# Patient Record
Sex: MALE | Race: WHITE | NOT HISPANIC OR LATINO | Employment: FULL TIME | URBAN - METROPOLITAN AREA
[De-identification: names, ages, dates, MRNs, and addresses within clinical notes are randomized per-mention and may not be internally consistent; named-entity substitution may affect disease eponyms.]

---

## 2017-03-09 RX ORDER — BUPROPION HYDROCHLORIDE 100 MG/1
TABLET ORAL
Qty: 180 TABLET | Refills: 0 | Status: SHIPPED | OUTPATIENT
Start: 2017-03-09 | End: 2017-06-11 | Stop reason: SDUPTHER

## 2017-03-29 ENCOUNTER — OFFICE VISIT (OUTPATIENT)
Dept: INTERNAL MEDICINE | Facility: CLINIC | Age: 37
End: 2017-03-29

## 2017-03-29 VITALS
WEIGHT: 210 LBS | HEIGHT: 68 IN | BODY MASS INDEX: 31.83 KG/M2 | SYSTOLIC BLOOD PRESSURE: 130 MMHG | DIASTOLIC BLOOD PRESSURE: 78 MMHG | HEART RATE: 68 BPM

## 2017-03-29 DIAGNOSIS — F32.89 OTHER DEPRESSION: Primary | ICD-10-CM

## 2017-03-29 DIAGNOSIS — R53.83 OTHER FATIGUE: ICD-10-CM

## 2017-03-29 DIAGNOSIS — R35.89 POLYURIA: ICD-10-CM

## 2017-03-29 DIAGNOSIS — H61.22 IMPACTED CERUMEN, LEFT EAR: ICD-10-CM

## 2017-03-29 PROBLEM — F32.A DEPRESSION: Status: ACTIVE | Noted: 2017-03-29

## 2017-03-29 LAB
BILIRUB BLD-MCNC: NEGATIVE MG/DL
CLARITY, POC: CLEAR
COLOR UR: YELLOW
GLUCOSE UR STRIP-MCNC: NEGATIVE MG/DL
KETONES UR QL: NEGATIVE
LEUKOCYTE EST, POC: NEGATIVE
NITRITE UR-MCNC: NEGATIVE MG/ML
PH UR: 7 [PH] (ref 5–8)
PROT UR STRIP-MCNC: NEGATIVE MG/DL
RBC # UR STRIP: ABNORMAL /UL
SP GR UR: 1.01 (ref 1–1.03)
UROBILINOGEN UR QL: NORMAL

## 2017-03-29 PROCEDURE — 81003 URINALYSIS AUTO W/O SCOPE: CPT | Performed by: INTERNAL MEDICINE

## 2017-03-29 PROCEDURE — 99203 OFFICE O/P NEW LOW 30 MIN: CPT | Performed by: INTERNAL MEDICINE

## 2017-03-29 NOTE — PROGRESS NOTES
Patient is a 36 y.o. male who is here to re establish care.  Chief Complaint   Patient presents with   • Establish Care         HPI:  Here for re est care.  Currently fatigued for some time.  No polyuria or dipsia.  Concerned he may be diabetic.  Weight is steady.  No dizziness or lightheadedness.  No HA's.  No abdominal complaints.  Some cold intolerance.  No ankle edema.      History:    Patient Active Problem List   Diagnosis   • Depression   • Fatigue   • Polyuria   • Impacted cerumen, left ear       No past medical history on file.    Past Surgical History:   Procedure Laterality Date   • HEMORRHOIDECTOMY  2015       Current Outpatient Prescriptions on File Prior to Visit   Medication Sig   • buPROPion (WELLBUTRIN) 100 MG tablet TAKE ONE TABLET BY MOUTH TWICE DAILY     No current facility-administered medications on file prior to visit.        Family History   Problem Relation Age of Onset   • Hypertension Father    • Hypertension Sister    • Diabetes Neg Hx        Social History     Social History   • Marital status: Single     Spouse name: N/A   • Number of children: N/A   • Years of education: N/A     Occupational History   • Not on file.     Social History Main Topics   • Smoking status: Former Smoker   • Smokeless tobacco: Never Used   • Alcohol use No   • Drug use: No   • Sexual activity: Not on file     Other Topics Concern   • Not on file     Social History Narrative   • No narrative on file         ROS:    Review of Systems   Constitutional: Positive for fatigue. Negative for chills, fever and unexpected weight change.   HENT: Negative for congestion, ear pain, hearing loss, rhinorrhea, sinus pressure, sore throat and trouble swallowing.    Eyes: Negative for discharge and itching.   Respiratory: Negative for cough, chest tightness and shortness of breath.    Cardiovascular: Negative for chest pain, palpitations and leg swelling.   Gastrointestinal: Negative for abdominal pain, blood in stool,  "constipation, diarrhea and vomiting.   Endocrine: Negative for polydipsia and polyuria.   Genitourinary: Negative for difficulty urinating, dysuria, enuresis, frequency, hematuria and urgency.   Musculoskeletal: Negative for arthralgias, back pain, gait problem and joint swelling.   Skin: Negative for rash and wound.   Allergic/Immunologic: Negative for immunocompromised state.   Neurological: Negative for dizziness, syncope, weakness, light-headedness, numbness and headaches.   Hematological: Does not bruise/bleed easily.   Psychiatric/Behavioral: Negative for behavioral problems, dysphoric mood and sleep disturbance. The patient is not nervous/anxious.        /78  Pulse 68  Ht 67.5\" (171.5 cm)  Wt 210 lb (95.3 kg)  BMI 32.41 kg/m2    Physical Exam:    Physical Exam   Constitutional: He is oriented to person, place, and time. He appears well-developed and well-nourished.   HENT:   Head: Normocephalic and atraumatic.   Right Ear: External ear normal.   Left Ear: External ear normal.   Mouth/Throat: Oropharynx is clear and moist.   Eyes: Conjunctivae and EOM are normal.   Neck: Normal range of motion. Neck supple.   Cardiovascular: Normal rate, regular rhythm and normal heart sounds.    Pulmonary/Chest: Effort normal and breath sounds normal.   Abdominal: Soft. Bowel sounds are normal.   Musculoskeletal: Normal range of motion.   Lymphadenopathy:     He has no cervical adenopathy.   Neurological: He is alert and oriented to person, place, and time.   Skin: Skin is warm and dry.   Psychiatric: He has a normal mood and affect. His behavior is normal. Thought content normal.       Procedure:      Discussion/Summary:  Fatigue-labs soon  Depression-stable  Polyuria-check UA  Cerumen-debrox prn      Current Outpatient Prescriptions:   •  buPROPion (WELLBUTRIN) 100 MG tablet, TAKE ONE TABLET BY MOUTH TWICE DAILY, Disp: 180 tablet, Rfl: 0        Robert was seen today for establish care.    Diagnoses and all orders " for this visit:    Other depression    Other fatigue    Polyuria  -     POC Urinalysis Dipstick, Automated    Impacted cerumen, left ear

## 2017-04-11 ENCOUNTER — LAB (OUTPATIENT)
Dept: INTERNAL MEDICINE | Facility: CLINIC | Age: 37
End: 2017-04-11

## 2017-04-11 DIAGNOSIS — R31.9 HEMATURIA: Primary | ICD-10-CM

## 2017-04-11 LAB
BILIRUB BLD-MCNC: NEGATIVE MG/DL
CLARITY, POC: CLEAR
COLOR UR: YELLOW
GLUCOSE UR STRIP-MCNC: NEGATIVE MG/DL
KETONES UR QL: NEGATIVE
LEUKOCYTE EST, POC: NEGATIVE
NITRITE UR-MCNC: NEGATIVE MG/ML
PH UR: 6 [PH] (ref 5–8)
PROT UR STRIP-MCNC: NEGATIVE MG/DL
RBC # UR STRIP: ABNORMAL /UL
SP GR UR: 1.02 (ref 1–1.03)
UROBILINOGEN UR QL: NORMAL

## 2017-04-11 PROCEDURE — 81003 URINALYSIS AUTO W/O SCOPE: CPT | Performed by: INTERNAL MEDICINE

## 2017-04-13 DIAGNOSIS — R31.9 HEMATURIA: Primary | ICD-10-CM

## 2017-06-12 RX ORDER — BUPROPION HYDROCHLORIDE 100 MG/1
TABLET ORAL
Qty: 180 TABLET | Refills: 0 | Status: SHIPPED | OUTPATIENT
Start: 2017-06-12 | End: 2017-09-14 | Stop reason: SDUPTHER

## 2017-09-14 RX ORDER — BUPROPION HYDROCHLORIDE 100 MG/1
TABLET ORAL
Qty: 180 TABLET | Refills: 0 | Status: SHIPPED | OUTPATIENT
Start: 2017-09-14 | End: 2017-12-14 | Stop reason: SDUPTHER

## 2017-12-14 ENCOUNTER — OFFICE VISIT (OUTPATIENT)
Dept: INTERNAL MEDICINE | Facility: CLINIC | Age: 37
End: 2017-12-14

## 2017-12-14 VITALS
HEIGHT: 68 IN | BODY MASS INDEX: 32.74 KG/M2 | HEART RATE: 68 BPM | SYSTOLIC BLOOD PRESSURE: 130 MMHG | WEIGHT: 216 LBS | DIASTOLIC BLOOD PRESSURE: 74 MMHG

## 2017-12-14 DIAGNOSIS — R31.21 ASYMPTOMATIC MICROSCOPIC HEMATURIA: Primary | ICD-10-CM

## 2017-12-14 DIAGNOSIS — F32.89 OTHER DEPRESSION: ICD-10-CM

## 2017-12-14 PROCEDURE — 99213 OFFICE O/P EST LOW 20 MIN: CPT | Performed by: INTERNAL MEDICINE

## 2017-12-14 RX ORDER — BUPROPION HYDROCHLORIDE 100 MG/1
100 TABLET ORAL 2 TIMES DAILY
Qty: 180 TABLET | Refills: 3 | Status: SHIPPED | OUTPATIENT
Start: 2017-12-14 | End: 2018-12-05 | Stop reason: SDUPTHER

## 2017-12-14 NOTE — PROGRESS NOTES
Patient is a 37 y.o. male who is here for a follow up of chronic condition.  Chief Complaint   Patient presents with   • Depression         HPI:  Here for f/u.  Continues to be followed by urologist for hematuria.  Doing well on the wellbutrin.  Sleeping well.  Working 10 hour shifts.  Energy level is fair but working long days.     History:    Patient Active Problem List   Diagnosis   • Depression   • Fatigue   • Polyuria   • Impacted cerumen, left ear   • Asymptomatic microscopic hematuria       No past medical history on file.    Past Surgical History:   Procedure Laterality Date   • HEMORRHOIDECTOMY  2015       Current Outpatient Prescriptions on File Prior to Visit   Medication Sig   • [DISCONTINUED] buPROPion (WELLBUTRIN) 100 MG tablet TAKE ONE TABLET BY MOUTH TWICE DAILY     No current facility-administered medications on file prior to visit.        Family History   Problem Relation Age of Onset   • Hypertension Father    • Hypertension Sister    • Diabetes Neg Hx        Social History     Social History   • Marital status: Single     Spouse name: N/A   • Number of children: N/A   • Years of education: N/A     Occupational History   • Not on file.     Social History Main Topics   • Smoking status: Former Smoker   • Smokeless tobacco: Never Used   • Alcohol use No   • Drug use: No   • Sexual activity: Not on file     Other Topics Concern   • Not on file     Social History Narrative         ROS:    Review of Systems   Constitutional: Positive for fatigue. Negative for chills, fever and unexpected weight change.   HENT: Negative for congestion, ear pain, hearing loss, rhinorrhea, sinus pressure, sore throat and trouble swallowing.    Eyes: Negative for discharge and itching.   Respiratory: Negative for cough, chest tightness and shortness of breath.    Cardiovascular: Negative for chest pain, palpitations and leg swelling.   Gastrointestinal: Negative for abdominal pain, blood in stool, constipation, diarrhea  "and vomiting.   Endocrine: Negative for polydipsia and polyuria.   Genitourinary: Negative for difficulty urinating, dysuria, enuresis, frequency, hematuria and urgency.   Musculoskeletal: Negative for arthralgias, back pain, gait problem and joint swelling.   Skin: Negative for rash and wound.   Allergic/Immunologic: Negative for immunocompromised state.   Neurological: Negative for dizziness, syncope, weakness, light-headedness, numbness and headaches.   Hematological: Does not bruise/bleed easily.   Psychiatric/Behavioral: Negative for behavioral problems, dysphoric mood and sleep disturbance. The patient is not nervous/anxious.        /74  Pulse 68  Ht 171.5 cm (67.52\")  Wt 98 kg (216 lb)  BMI 33.31 kg/m2    Physical Exam:    Physical Exam   Constitutional: He is oriented to person, place, and time. He appears well-developed and well-nourished.   HENT:   Head: Normocephalic and atraumatic.   Right Ear: External ear normal.   Left Ear: External ear normal.   Mouth/Throat: Oropharynx is clear and moist.   Eyes: Conjunctivae and EOM are normal.   Neck: Normal range of motion. Neck supple.   Cardiovascular: Normal rate, regular rhythm and normal heart sounds.    Pulmonary/Chest: Effort normal and breath sounds normal.   Abdominal: Soft. Bowel sounds are normal.   Musculoskeletal: Normal range of motion.   Lymphadenopathy:     He has no cervical adenopathy.   Neurological: He is alert and oriented to person, place, and time.   Skin: Skin is warm and dry.   Psychiatric: He has a normal mood and affect. His behavior is normal. Thought content normal.       Procedure:      Discussion/Summary:      Depression-stable  Hematuria-will refer to nephrology  Cerumen-debrox prn      Current Outpatient Prescriptions:   •  buPROPion (WELLBUTRIN) 100 MG tablet, Take 1 tablet by mouth 2 (Two) Times a Day., Disp: 180 tablet, Rfl: 3        Robert was seen today for depression.    Diagnoses and all orders for this " visit:    Asymptomatic microscopic hematuria  -     Ambulatory Referral to Nephrology    Other depression  -     buPROPion (WELLBUTRIN) 100 MG tablet; Take 1 tablet by mouth 2 (Two) Times a Day.

## 2018-03-10 ENCOUNTER — APPOINTMENT (OUTPATIENT)
Dept: LAB | Facility: HOSPITAL | Age: 38
End: 2018-03-10

## 2018-03-10 ENCOUNTER — LAB REQUISITION (OUTPATIENT)
Dept: LAB | Facility: HOSPITAL | Age: 38
End: 2018-03-10

## 2018-03-10 DIAGNOSIS — Z00.00 ROUTINE GENERAL MEDICAL EXAMINATION AT A HEALTH CARE FACILITY: ICD-10-CM

## 2018-03-10 DIAGNOSIS — N18.9 CHRONIC KIDNEY DISEASE: ICD-10-CM

## 2018-03-10 LAB
ALBUMIN SERPL-MCNC: 4.4 G/DL (ref 3.2–4.8)
ANION GAP SERPL CALCULATED.3IONS-SCNC: 2 MMOL/L (ref 3–11)
BACTERIA UR QL AUTO: ABNORMAL /HPF
BASOPHILS # BLD AUTO: 0.02 10*3/MM3 (ref 0–0.2)
BASOPHILS NFR BLD AUTO: 0.3 % (ref 0–1)
BILIRUB UR QL STRIP: NEGATIVE
BUN BLD-MCNC: 14 MG/DL (ref 9–23)
BUN/CREAT SERPL: 12.7 (ref 7–25)
CALCIUM SPEC-SCNC: 9.2 MG/DL (ref 8.7–10.4)
CHLORIDE SERPL-SCNC: 108 MMOL/L (ref 99–109)
CLARITY UR: CLEAR
CO2 SERPL-SCNC: 31 MMOL/L (ref 20–31)
COLOR UR: YELLOW
CREAT BLD-MCNC: 1.1 MG/DL (ref 0.6–1.3)
CREAT UR-MCNC: 120.8 MG/DL
DEPRECATED RDW RBC AUTO: 42.4 FL (ref 37–54)
EOSINOPHIL # BLD AUTO: 0.15 10*3/MM3 (ref 0–0.3)
EOSINOPHIL NFR BLD AUTO: 2.4 % (ref 0–3)
ERYTHROCYTE [DISTWIDTH] IN BLOOD BY AUTOMATED COUNT: 13 % (ref 11.3–14.5)
GFR SERPL CREATININE-BSD FRML MDRD: 75 ML/MIN/1.73
GLUCOSE BLD-MCNC: 104 MG/DL (ref 70–100)
GLUCOSE UR STRIP-MCNC: NEGATIVE MG/DL
HCT VFR BLD AUTO: 48.2 % (ref 38.9–50.9)
HGB BLD-MCNC: 15.8 G/DL (ref 13.1–17.5)
HGB UR QL STRIP.AUTO: ABNORMAL
HYALINE CASTS UR QL AUTO: ABNORMAL /LPF
IMM GRANULOCYTES # BLD: 0.01 10*3/MM3 (ref 0–0.03)
IMM GRANULOCYTES NFR BLD: 0.2 % (ref 0–0.6)
KETONES UR QL STRIP: NEGATIVE
LEUKOCYTE ESTERASE UR QL STRIP.AUTO: NEGATIVE
LYMPHOCYTES # BLD AUTO: 2.06 10*3/MM3 (ref 0.6–4.8)
LYMPHOCYTES NFR BLD AUTO: 33.4 % (ref 24–44)
MCH RBC QN AUTO: 29.2 PG (ref 27–31)
MCHC RBC AUTO-ENTMCNC: 32.8 G/DL (ref 32–36)
MCV RBC AUTO: 88.9 FL (ref 80–99)
MONOCYTES # BLD AUTO: 0.57 10*3/MM3 (ref 0–1)
MONOCYTES NFR BLD AUTO: 9.3 % (ref 0–12)
NEUTROPHILS # BLD AUTO: 3.35 10*3/MM3 (ref 1.5–8.3)
NEUTROPHILS NFR BLD AUTO: 54.4 % (ref 41–71)
NITRITE UR QL STRIP: NEGATIVE
PH UR STRIP.AUTO: 7 [PH] (ref 5–8)
PHOSPHATE SERPL-MCNC: 3.2 MG/DL (ref 2.4–5.1)
PLATELET # BLD AUTO: 208 10*3/MM3 (ref 150–450)
PMV BLD AUTO: 9.2 FL (ref 6–12)
POTASSIUM BLD-SCNC: 5 MMOL/L (ref 3.5–5.5)
PROT UR QL STRIP: NEGATIVE
PROT UR-MCNC: 5 MG/DL (ref 1–14)
RBC # BLD AUTO: 5.42 10*6/MM3 (ref 4.2–5.76)
RBC # UR: ABNORMAL /HPF
REF LAB TEST METHOD: ABNORMAL
SODIUM BLD-SCNC: 141 MMOL/L (ref 132–146)
SP GR UR STRIP: 1.01 (ref 1–1.03)
SQUAMOUS #/AREA URNS HPF: ABNORMAL /HPF
UROBILINOGEN UR QL STRIP: ABNORMAL
WBC NRBC COR # BLD: 6.16 10*3/MM3 (ref 3.5–10.8)
WBC UR QL AUTO: ABNORMAL /HPF

## 2018-03-10 PROCEDURE — 80069 RENAL FUNCTION PANEL: CPT | Performed by: INTERNAL MEDICINE

## 2018-03-10 PROCEDURE — 85025 COMPLETE CBC W/AUTO DIFF WBC: CPT | Performed by: INTERNAL MEDICINE

## 2018-03-10 PROCEDURE — 81001 URINALYSIS AUTO W/SCOPE: CPT | Performed by: INTERNAL MEDICINE

## 2018-03-10 PROCEDURE — 84156 ASSAY OF PROTEIN URINE: CPT | Performed by: INTERNAL MEDICINE

## 2018-03-10 PROCEDURE — 82570 ASSAY OF URINE CREATININE: CPT | Performed by: INTERNAL MEDICINE

## 2018-04-30 ENCOUNTER — LAB REQUISITION (OUTPATIENT)
Dept: LAB | Facility: HOSPITAL | Age: 38
End: 2018-04-30

## 2018-04-30 DIAGNOSIS — Z00.00 ROUTINE GENERAL MEDICAL EXAMINATION AT A HEALTH CARE FACILITY: ICD-10-CM

## 2018-04-30 LAB
COLLECT DURATION TIME UR: 24 HRS
CREAT UR-MCNC: 59.8 MG/DL
CREATINE 24H UR-MRATE: 0.9 G/24 HR (ref 1–2)
SPECIMEN VOL 24H UR: 1500 ML

## 2018-04-30 PROCEDURE — 82570 ASSAY OF URINE CREATININE: CPT | Performed by: INTERNAL MEDICINE

## 2018-04-30 PROCEDURE — 81050 URINALYSIS VOLUME MEASURE: CPT | Performed by: INTERNAL MEDICINE

## 2018-05-30 RX ORDER — METHYLPREDNISOLONE 4 MG/1
TABLET ORAL
Qty: 21 TABLET | Refills: 0 | Status: SHIPPED | OUTPATIENT
Start: 2018-05-30 | End: 2019-04-01

## 2018-06-03 ENCOUNTER — LAB REQUISITION (OUTPATIENT)
Dept: LAB | Facility: HOSPITAL | Age: 38
End: 2018-06-03

## 2018-06-03 DIAGNOSIS — R79.89 OTHER SPECIFIED ABNORMAL FINDINGS OF BLOOD CHEMISTRY: ICD-10-CM

## 2018-06-03 DIAGNOSIS — Z00.00 ROUTINE GENERAL MEDICAL EXAMINATION AT A HEALTH CARE FACILITY: ICD-10-CM

## 2018-06-03 LAB
ANION GAP SERPL CALCULATED.3IONS-SCNC: 6 MMOL/L (ref 3–11)
BACTERIA UR QL AUTO: NORMAL /HPF
BILIRUB UR QL STRIP: NEGATIVE
BUN BLD-MCNC: 24 MG/DL (ref 9–23)
BUN/CREAT SERPL: 21.8 (ref 7–25)
CALCIUM SPEC-SCNC: 9 MG/DL (ref 8.7–10.4)
CHLORIDE SERPL-SCNC: 102 MMOL/L (ref 99–109)
CLARITY UR: CLEAR
CO2 SERPL-SCNC: 31 MMOL/L (ref 20–31)
COLLECT DURATION TIME UR: 24 HRS
COLOR UR: YELLOW
CREAT BLD-MCNC: 1.1 MG/DL (ref 0.6–1.3)
CREAT UR-MCNC: 48.5 MG/DL
CREATINE 24H UR-MRATE: 1.41 G/24 HR (ref 1–2)
GFR SERPL CREATININE-BSD FRML MDRD: 75 ML/MIN/1.73
GLUCOSE BLD-MCNC: 96 MG/DL (ref 70–100)
GLUCOSE UR STRIP-MCNC: NEGATIVE MG/DL
HGB UR QL STRIP.AUTO: ABNORMAL
HYALINE CASTS UR QL AUTO: NORMAL /LPF
KETONES UR QL STRIP: NEGATIVE
LEUKOCYTE ESTERASE UR QL STRIP.AUTO: NEGATIVE
NITRITE UR QL STRIP: NEGATIVE
PH UR STRIP.AUTO: 5.5 [PH] (ref 5–8)
POTASSIUM BLD-SCNC: 4.4 MMOL/L (ref 3.5–5.5)
PROT UR QL STRIP: NEGATIVE
RBC # UR: NORMAL /HPF
REF LAB TEST METHOD: NORMAL
SODIUM BLD-SCNC: 139 MMOL/L (ref 132–146)
SP GR UR STRIP: 1.02 (ref 1–1.03)
SPECIMEN VOL 24H UR: 2900 ML
SQUAMOUS #/AREA URNS HPF: NORMAL /HPF
UROBILINOGEN UR QL STRIP: ABNORMAL
WBC UR QL AUTO: NORMAL /HPF

## 2018-06-03 PROCEDURE — 81050 URINALYSIS VOLUME MEASURE: CPT | Performed by: INTERNAL MEDICINE

## 2018-06-03 PROCEDURE — 80048 BASIC METABOLIC PNL TOTAL CA: CPT | Performed by: INTERNAL MEDICINE

## 2018-06-03 PROCEDURE — 81001 URINALYSIS AUTO W/SCOPE: CPT | Performed by: INTERNAL MEDICINE

## 2018-06-03 PROCEDURE — 82570 ASSAY OF URINE CREATININE: CPT | Performed by: INTERNAL MEDICINE

## 2018-12-05 DIAGNOSIS — F32.89 OTHER DEPRESSION: ICD-10-CM

## 2018-12-05 RX ORDER — BUPROPION HYDROCHLORIDE 100 MG/1
TABLET ORAL
Qty: 180 TABLET | Refills: 0 | Status: SHIPPED | OUTPATIENT
Start: 2018-12-05 | End: 2019-03-07 | Stop reason: SDUPTHER

## 2019-03-07 DIAGNOSIS — F32.89 OTHER DEPRESSION: ICD-10-CM

## 2019-03-07 RX ORDER — BUPROPION HYDROCHLORIDE 100 MG/1
TABLET ORAL
Qty: 180 TABLET | Refills: 0 | Status: SHIPPED | OUTPATIENT
Start: 2019-03-07 | End: 2020-06-03

## 2019-04-01 ENCOUNTER — OFFICE VISIT (OUTPATIENT)
Dept: FAMILY MEDICINE CLINIC | Facility: CLINIC | Age: 39
End: 2019-04-01

## 2019-04-01 ENCOUNTER — LAB REQUISITION (OUTPATIENT)
Dept: LAB | Facility: HOSPITAL | Age: 39
End: 2019-04-01

## 2019-04-01 VITALS
WEIGHT: 197 LBS | TEMPERATURE: 98.2 F | RESPIRATION RATE: 16 BRPM | SYSTOLIC BLOOD PRESSURE: 124 MMHG | OXYGEN SATURATION: 98 % | HEART RATE: 90 BPM | HEIGHT: 69 IN | BODY MASS INDEX: 29.18 KG/M2 | DIASTOLIC BLOOD PRESSURE: 80 MMHG

## 2019-04-01 DIAGNOSIS — Z00.00 HEALTHCARE MAINTENANCE: Primary | ICD-10-CM

## 2019-04-01 DIAGNOSIS — Z00.00 ROUTINE GENERAL MEDICAL EXAMINATION AT A HEALTH CARE FACILITY: ICD-10-CM

## 2019-04-01 PROBLEM — R31.21 ASYMPTOMATIC MICROSCOPIC HEMATURIA: Status: RESOLVED | Noted: 2017-12-14 | Resolved: 2019-04-01

## 2019-04-01 PROBLEM — R53.83 FATIGUE: Status: RESOLVED | Noted: 2017-03-29 | Resolved: 2019-04-01

## 2019-04-01 PROBLEM — R35.89 POLYURIA: Status: RESOLVED | Noted: 2017-03-29 | Resolved: 2019-04-01

## 2019-04-01 PROBLEM — F32.A DEPRESSION: Status: RESOLVED | Noted: 2017-03-29 | Resolved: 2019-04-01

## 2019-04-01 PROBLEM — H61.22 IMPACTED CERUMEN, LEFT EAR: Status: RESOLVED | Noted: 2017-03-29 | Resolved: 2019-04-01

## 2019-04-01 LAB
ALBUMIN SERPL-MCNC: 4.78 G/DL (ref 3.2–4.8)
ALBUMIN/GLOB SERPL: 2.1 G/DL (ref 1.5–2.5)
ALP SERPL-CCNC: 82 U/L (ref 25–100)
ALT SERPL W P-5'-P-CCNC: 24 U/L (ref 7–40)
ANION GAP SERPL CALCULATED.3IONS-SCNC: 11 MMOL/L (ref 3–11)
ARTICHOKE IGE QN: 45 MG/DL (ref 0–130)
AST SERPL-CCNC: 22 U/L (ref 0–33)
BILIRUB SERPL-MCNC: 0.5 MG/DL (ref 0.3–1.2)
BUN BLD-MCNC: 13 MG/DL (ref 9–23)
BUN/CREAT SERPL: 12.5 (ref 7–25)
CALCIUM SPEC-SCNC: 9.7 MG/DL (ref 8.7–10.4)
CHLORIDE SERPL-SCNC: 104 MMOL/L (ref 99–109)
CHOLEST SERPL-MCNC: 113 MG/DL (ref 0–200)
CO2 SERPL-SCNC: 26 MMOL/L (ref 20–31)
CREAT BLD-MCNC: 1.04 MG/DL (ref 0.6–1.3)
CRP SERPL-MCNC: 1.72 MG/DL (ref 0–1)
GFR SERPL CREATININE-BSD FRML MDRD: 79 ML/MIN/1.73
GLOBULIN UR ELPH-MCNC: 2.3 GM/DL
GLUCOSE BLD-MCNC: 103 MG/DL (ref 70–100)
HAV IGM SERPL QL IA: NORMAL
HBA1C MFR BLD: 5 % (ref 4.8–5.6)
HBV CORE IGM SERPL QL IA: NORMAL
HBV SURFACE AG SERPL QL IA: NORMAL
HCV AB SER DONR QL: NORMAL
HDLC SERPL-MCNC: 48 MG/DL (ref 40–60)
HIV1+2 AB SER QL: NORMAL
POTASSIUM BLD-SCNC: 4.1 MMOL/L (ref 3.5–5.5)
PROT SERPL-MCNC: 7.1 G/DL (ref 5.7–8.2)
PSA SERPL-MCNC: 0.69 NG/ML (ref 0–4)
SODIUM BLD-SCNC: 141 MMOL/L (ref 132–146)
TRIGL SERPL-MCNC: 140 MG/DL (ref 0–150)
TSH SERPL DL<=0.05 MIU/L-ACNC: 1.89 MIU/ML (ref 0.35–5.35)
URATE SERPL-MCNC: 5.9 MG/DL (ref 3.7–9.2)

## 2019-04-01 PROCEDURE — 83036 HEMOGLOBIN GLYCOSYLATED A1C: CPT

## 2019-04-01 PROCEDURE — 90471 IMMUNIZATION ADMIN: CPT | Performed by: FAMILY MEDICINE

## 2019-04-01 PROCEDURE — G0432 EIA HIV-1/HIV-2 SCREEN: HCPCS

## 2019-04-01 PROCEDURE — 86140 C-REACTIVE PROTEIN: CPT

## 2019-04-01 PROCEDURE — 80061 LIPID PANEL: CPT

## 2019-04-01 PROCEDURE — 36415 COLL VENOUS BLD VENIPUNCTURE: CPT | Performed by: FAMILY MEDICINE

## 2019-04-01 PROCEDURE — 80074 ACUTE HEPATITIS PANEL: CPT

## 2019-04-01 PROCEDURE — 86592 SYPHILIS TEST NON-TREP QUAL: CPT

## 2019-04-01 PROCEDURE — 84550 ASSAY OF BLOOD/URIC ACID: CPT

## 2019-04-01 PROCEDURE — 90715 TDAP VACCINE 7 YRS/> IM: CPT | Performed by: FAMILY MEDICINE

## 2019-04-01 PROCEDURE — 84443 ASSAY THYROID STIM HORMONE: CPT

## 2019-04-01 PROCEDURE — 80053 COMPREHEN METABOLIC PANEL: CPT

## 2019-04-01 PROCEDURE — G0103 PSA SCREENING: HCPCS

## 2019-04-01 PROCEDURE — 99386 PREV VISIT NEW AGE 40-64: CPT | Performed by: FAMILY MEDICINE

## 2019-04-01 NOTE — PROGRESS NOTES
Subjective   Guanaco Feliciano is a 38 y.o. male.     History of Present Illness   New patient to the office.  He describes recent and previous care with Dr. MONICA Beck.  He is here for his annual fasting wellness evaluation.  He is amendable to updating his immunizations.  He voices no acute symptoms.    Review of Systems   Constitutional: Negative.    HENT: Negative.    Eyes: Negative.    Respiratory: Negative.    Cardiovascular: Negative.    Gastrointestinal: Negative.    Endocrine: Negative.    Genitourinary: Negative.    Musculoskeletal: Negative.    Skin: Negative.    Allergic/Immunologic: Negative.    Neurological: Negative.    Hematological: Negative.    Psychiatric/Behavioral: Negative.        Objective   Physical Exam   Constitutional: He is oriented to person, place, and time. He appears well-developed and well-nourished. He is cooperative.   HENT:   Head: Normocephalic and atraumatic.   Right Ear: Hearing and external ear normal.   Left Ear: Hearing and external ear normal.   Nose: Nose normal.   Mouth/Throat: Uvula is midline, oropharynx is clear and moist and mucous membranes are normal.   Eyes: Conjunctivae and EOM are normal. Pupils are equal, round, and reactive to light. No scleral icterus.   Neck: Trachea normal and normal range of motion. Neck supple. No JVD present. Carotid bruit is not present. No thyromegaly present.   Cardiovascular: Normal rate, regular rhythm, normal heart sounds and intact distal pulses.   Pulmonary/Chest: Effort normal and breath sounds normal.   Abdominal: Soft. Bowel sounds are normal. There is no hepatosplenomegaly. There is no tenderness.   Musculoskeletal: Normal range of motion.   Lymphadenopathy:     He has no cervical adenopathy.   Neurological: He is alert and oriented to person, place, and time. He has normal strength and normal reflexes. No sensory deficit. Gait normal.   Skin: Skin is warm and dry.   Psychiatric: He has a normal mood and affect. His speech is  normal and behavior is normal. Judgment and thought content normal. Cognition and memory are normal.   Nursing note and vitals reviewed.      Assessment/Plan   Diagnoses and all orders for this visit:    Healthcare maintenance  -     POC Urinalysis Dipstick, Automated  -     Comprehensive Metabolic Panel  -     CBC & Differential  -     Lipid Panel  -     TSH  -     Uric Acid  -     C-reactive Protein  -     PSA Screen  -     HIV-1 / O / 2 Ag / Antibody 4th Generation  -     RPR  -     Hepatitis Panel, Acute  -     Hemoglobin A1c  -     Tdap Vaccine IM  - Vaccine counseling and current VIS is provided for immunizations administered today.    The patient is here for a health maintenance visit.  Currently, the patient consumes a healthy diet and has an adequate exercise regimen. Screening lab work is ordered.  Immunizations are updated today.  Advice and education is given regarding nutrition, aerobic exercise, routine dental evaluations, routine eye exams, reproductive health, cardiovascular risk reduction, sunscreen use, self skin examination (annual dermatology evaluations) and seat belt use (general overall safety).  Further recommendations after lab evaluation.  Annual wellness evaluations recommended.

## 2019-04-03 LAB — RPR SER QL: NORMAL

## 2019-04-09 DIAGNOSIS — F41.1 GAD (GENERALIZED ANXIETY DISORDER): Primary | ICD-10-CM

## 2019-06-07 DIAGNOSIS — F32.89 OTHER DEPRESSION: ICD-10-CM

## 2019-06-07 RX ORDER — BUPROPION HYDROCHLORIDE 100 MG/1
TABLET ORAL
Qty: 180 TABLET | Refills: 0 | OUTPATIENT
Start: 2019-06-07

## 2019-08-13 ENCOUNTER — LAB REQUISITION (OUTPATIENT)
Dept: LAB | Facility: HOSPITAL | Age: 39
End: 2019-08-13

## 2019-08-13 DIAGNOSIS — Z00.00 ROUTINE GENERAL MEDICAL EXAMINATION AT A HEALTH CARE FACILITY: ICD-10-CM

## 2019-08-13 PROCEDURE — 36415 COLL VENOUS BLD VENIPUNCTURE: CPT | Performed by: INTERNAL MEDICINE

## 2020-06-03 ENCOUNTER — OFFICE VISIT (OUTPATIENT)
Dept: FAMILY MEDICINE CLINIC | Facility: CLINIC | Age: 40
End: 2020-06-03

## 2020-06-03 VITALS
DIASTOLIC BLOOD PRESSURE: 62 MMHG | BODY MASS INDEX: 32.05 KG/M2 | HEIGHT: 66 IN | RESPIRATION RATE: 20 BRPM | WEIGHT: 199.4 LBS | SYSTOLIC BLOOD PRESSURE: 116 MMHG | TEMPERATURE: 97.5 F | HEART RATE: 66 BPM

## 2020-06-03 DIAGNOSIS — Z00.00 WELL ADULT EXAM: Primary | ICD-10-CM

## 2020-06-03 PROBLEM — F31.81 BIPOLAR 2 DISORDER, MAJOR DEPRESSIVE EPISODE (HCC): Status: ACTIVE | Noted: 2020-06-03

## 2020-06-03 PROBLEM — K60.2 ANAL FISSURE: Status: RESOLVED | Noted: 2020-06-03 | Resolved: 2020-06-03

## 2020-06-03 LAB
ALBUMIN SERPL-MCNC: 4.7 G/DL (ref 3.5–5.2)
ALBUMIN/GLOB SERPL: 2.4 G/DL
ALP SERPL-CCNC: 69 U/L (ref 39–117)
ALT SERPL W P-5'-P-CCNC: 15 U/L (ref 1–41)
ANION GAP SERPL CALCULATED.3IONS-SCNC: 5.8 MMOL/L (ref 5–15)
AST SERPL-CCNC: 21 U/L (ref 1–40)
BASOPHILS # BLD AUTO: 0.04 10*3/MM3 (ref 0–0.2)
BASOPHILS NFR BLD AUTO: 0.7 % (ref 0–1.5)
BILIRUB SERPL-MCNC: 0.4 MG/DL (ref 0.2–1.2)
BUN BLD-MCNC: 15 MG/DL (ref 6–20)
BUN/CREAT SERPL: 17.2 (ref 7–25)
CALCIUM SPEC-SCNC: 9.6 MG/DL (ref 8.6–10.5)
CHLORIDE SERPL-SCNC: 102 MMOL/L (ref 98–107)
CHOLEST SERPL-MCNC: 103 MG/DL (ref 0–200)
CO2 SERPL-SCNC: 30.2 MMOL/L (ref 22–29)
CREAT BLD-MCNC: 0.87 MG/DL (ref 0.76–1.27)
DEPRECATED RDW RBC AUTO: 40.3 FL (ref 37–54)
EOSINOPHIL # BLD AUTO: 0.09 10*3/MM3 (ref 0–0.4)
EOSINOPHIL NFR BLD AUTO: 1.6 % (ref 0.3–6.2)
ERYTHROCYTE [DISTWIDTH] IN BLOOD BY AUTOMATED COUNT: 12.5 % (ref 12.3–15.4)
GFR SERPL CREATININE-BSD FRML MDRD: 97 ML/MIN/1.73
GLOBULIN UR ELPH-MCNC: 2 GM/DL
GLUCOSE BLD-MCNC: 92 MG/DL (ref 65–99)
HBA1C MFR BLD: 5.27 % (ref 4.8–5.6)
HCT VFR BLD AUTO: 48.7 % (ref 37.5–51)
HDLC SERPL-MCNC: 50 MG/DL (ref 40–60)
HGB BLD-MCNC: 16.7 G/DL (ref 13–17.7)
IMM GRANULOCYTES # BLD AUTO: 0.02 10*3/MM3 (ref 0–0.05)
IMM GRANULOCYTES NFR BLD AUTO: 0.3 % (ref 0–0.5)
LDLC SERPL CALC-MCNC: 43 MG/DL (ref 0–100)
LDLC/HDLC SERPL: 0.86 {RATIO}
LYMPHOCYTES # BLD AUTO: 1.5 10*3/MM3 (ref 0.7–3.1)
LYMPHOCYTES NFR BLD AUTO: 26 % (ref 19.6–45.3)
MCH RBC QN AUTO: 29.9 PG (ref 26.6–33)
MCHC RBC AUTO-ENTMCNC: 34.3 G/DL (ref 31.5–35.7)
MCV RBC AUTO: 87.3 FL (ref 79–97)
MONOCYTES # BLD AUTO: 0.43 10*3/MM3 (ref 0.1–0.9)
MONOCYTES NFR BLD AUTO: 7.4 % (ref 5–12)
NEUTROPHILS # BLD AUTO: 3.7 10*3/MM3 (ref 1.7–7)
NEUTROPHILS NFR BLD AUTO: 64 % (ref 42.7–76)
NRBC BLD AUTO-RTO: 0 /100 WBC (ref 0–0.2)
PLATELET # BLD AUTO: 194 10*3/MM3 (ref 140–450)
PMV BLD AUTO: 9.6 FL (ref 6–12)
POTASSIUM BLD-SCNC: 4.5 MMOL/L (ref 3.5–5.2)
PROT SERPL-MCNC: 6.7 G/DL (ref 6–8.5)
RBC # BLD AUTO: 5.58 10*6/MM3 (ref 4.14–5.8)
SODIUM BLD-SCNC: 138 MMOL/L (ref 136–145)
TRIGL SERPL-MCNC: 51 MG/DL (ref 0–150)
TSH SERPL DL<=0.05 MIU/L-ACNC: 1.34 UIU/ML (ref 0.27–4.2)
VLDLC SERPL-MCNC: 10.2 MG/DL (ref 5–40)
WBC NRBC COR # BLD: 5.78 10*3/MM3 (ref 3.4–10.8)

## 2020-06-03 PROCEDURE — 80053 COMPREHEN METABOLIC PANEL: CPT | Performed by: FAMILY MEDICINE

## 2020-06-03 PROCEDURE — 83036 HEMOGLOBIN GLYCOSYLATED A1C: CPT | Performed by: FAMILY MEDICINE

## 2020-06-03 PROCEDURE — 80061 LIPID PANEL: CPT | Performed by: FAMILY MEDICINE

## 2020-06-03 PROCEDURE — 99396 PREV VISIT EST AGE 40-64: CPT | Performed by: FAMILY MEDICINE

## 2020-06-03 PROCEDURE — 85025 COMPLETE CBC W/AUTO DIFF WBC: CPT | Performed by: FAMILY MEDICINE

## 2020-06-03 PROCEDURE — 84443 ASSAY THYROID STIM HORMONE: CPT | Performed by: FAMILY MEDICINE

## 2020-06-03 RX ORDER — CETIRIZINE HYDROCHLORIDE 10 MG/1
10 TABLET ORAL DAILY
COMMUNITY
End: 2021-05-05

## 2020-06-03 RX ORDER — LAMOTRIGINE 150 MG/1
TABLET ORAL
COMMUNITY
Start: 2020-06-02 | End: 2021-11-17

## 2020-06-03 NOTE — PATIENT INSTRUCTIONS
Preventive Care 40-64 Years Old, Male  Preventive care refers to lifestyle choices and visits with your health care provider that can promote health and wellness. This includes:  · A yearly physical exam. This is also called an annual well check.  · Regular dental and eye exams.  · Immunizations.  · Screening for certain conditions.  · Healthy lifestyle choices, such as eating a healthy diet, getting regular exercise, not using drugs or products that contain nicotine and tobacco, and limiting alcohol use.  What can I expect for my preventive care visit?  Physical exam  Your health care provider will check:  · Height and weight. These may be used to calculate body mass index (BMI), which is a measurement that tells if you are at a healthy weight.  · Heart rate and blood pressure.  · Your skin for abnormal spots.  Counseling  Your health care provider may ask you questions about:  · Alcohol, tobacco, and drug use.  · Emotional well-being.  · Home and relationship well-being.  · Sexual activity.  · Eating habits.  · Work and work environment.  What immunizations do I need?    Influenza (flu) vaccine  · This is recommended every year.  Tetanus, diphtheria, and pertussis (Tdap) vaccine  · You may need a Td booster every 10 years.  Varicella (chickenpox) vaccine  · You may need this vaccine if you have not already been vaccinated.  Zoster (shingles) vaccine  · You may need this after age 60.  Measles, mumps, and rubella (MMR) vaccine  · You may need at least one dose of MMR if you were born in 1957 or later. You may also need a second dose.  Pneumococcal conjugate (PCV13) vaccine  · You may need this if you have certain conditions and were not previously vaccinated.  Pneumococcal polysaccharide (PPSV23) vaccine  · You may need one or two doses if you smoke cigarettes or if you have certain conditions.  Meningococcal conjugate (MenACWY) vaccine  · You may need this if you have certain conditions.  Hepatitis A  vaccine  · You may need this if you have certain conditions or if you travel or work in places where you may be exposed to hepatitis A.  Hepatitis B vaccine  · You may need this if you have certain conditions or if you travel or work in places where you may be exposed to hepatitis B.  Haemophilus influenzae type b (Hib) vaccine  · You may need this if you have certain risk factors.  Human papillomavirus (HPV) vaccine  · If recommended by your health care provider, you may need three doses over 6 months.  You may receive vaccines as individual doses or as more than one vaccine together in one shot (combination vaccines). Talk with your health care provider about the risks and benefits of combination vaccines.  What tests do I need?  Blood tests  · Lipid and cholesterol levels. These may be checked every 5 years, or more frequently if you are over 50 years old.  · Hepatitis C test.  · Hepatitis B test.  Screening  · Lung cancer screening. You may have this screening every year starting at age 55 if you have a 30-pack-year history of smoking and currently smoke or have quit within the past 15 years.  · Prostate cancer screening. Recommendations will vary depending on your family history and other risks.  · Colorectal cancer screening. All adults should have this screening starting at age 50 and continuing until age 75. Your health care provider may recommend screening at age 45 if you are at increased risk. You will have tests every 1-10 years, depending on your results and the type of screening test.  · Diabetes screening. This is done by checking your blood sugar (glucose) after you have not eaten for a while (fasting). You may have this done every 1-3 years.  · Sexually transmitted disease (STD) testing.  Follow these instructions at home:  Eating and drinking  · Eat a diet that includes fresh fruits and vegetables, whole grains, lean protein, and low-fat dairy products.  · Take vitamin and mineral supplements as  recommended by your health care provider.  · Do not drink alcohol if your health care provider tells you not to drink.  · If you drink alcohol:  ? Limit how much you have to 0-2 drinks a day.  ? Be aware of how much alcohol is in your drink. In the [place] one drink equals one 12 oz bottle of beer (355 mL), one 5 oz glass of wine (148 mL), or one 1½ oz glass of hard liquor (44 mL).  Lifestyle  · Take daily care of your teeth and gums.  · Stay active. Exercise for at least 30 minutes on 5 or more days each week.  · Do not use any products that contain nicotine or tobacco, such as cigarettes, e-cigarettes, and chewing tobacco. If you need help quitting, ask your health care provider.  · If you are sexually active, practice safe sex. Use a condom or other form of protection to prevent STIs (sexually transmitted infections).  · Talk with your health care provider about taking a low-dose aspirin every day starting at age 50.  What's next?  · Go to your health care provider once a year for a well check visit.  · Ask your health care provider how often you should have your eyes and teeth checked.  · Stay up to date on all vaccines.  This information is not intended to replace advice given to you by your health care provider. Make sure you discuss any questions you have with your health care provider.  Document Released: 01/13/2017 Document Revised: 12/12/2019 Document Reviewed: 12/12/2019  [employer] Patient Education © 2020 [employer]

## 2020-06-03 NOTE — PROGRESS NOTES
Guanaco Feliciano is a 39 y.o. male who presents today to establish care and complete annual physical.    Chief Complaint   Patient presents with   • Establish Care   • Annual Exam        Patient is here to establish care and complete annual physical exam.  Patient exercises regularly doing cardio now but normally in addition to cardio goes the gym 3 days a week.  He eats a varied healthy diet although he knows he can improve some parts of this.  Patient sees dentist regularly but has not been seen by optometrist or dermatologist.  Patient has no acute complaints at this time.  He follows with psychiatry for bipolar 2 which is well controlled on Lamictal.  Patient has history of alcohol abuse and recently was drinking for the past 2 weeks but is now been sober for 3 days again.  Previously to this he has been sober for many years.       Review of Systems   Constitutional: Negative for fever and unexpected weight loss.   HENT: Negative for congestion, ear pain and sore throat.    Eyes: Negative for visual disturbance.   Respiratory: Negative for cough, shortness of breath and wheezing.    Cardiovascular: Negative for chest pain and palpitations.   Gastrointestinal: Negative for abdominal pain, blood in stool, constipation, diarrhea, nausea, vomiting and GERD.   Endocrine: Negative for polydipsia and polyuria.   Genitourinary: Negative for difficulty urinating.   Musculoskeletal: Negative for joint swelling.   Skin: Negative for rash and skin lesions.   Allergic/Immunologic: Negative for environmental allergies.   Neurological: Negative for seizures and syncope.   Hematological: Does not bruise/bleed easily.   Psychiatric/Behavioral: Negative for suicidal ideas.        PHQ-9 Depression Screening  Little interest or pleasure in doing things? 1   Feeling down, depressed, or hopeless? 1   Trouble falling or staying asleep, or sleeping too much? 0   Feeling tired or having little energy? 0   Poor appetite or overeating? 0    Feeling bad about yourself - or that you are a failure or have let yourself or your family down? 0   Trouble concentrating on things, such as reading the newspaper or watching television? 1   Moving or speaking so slowly that other people could have noticed? Or the opposite - being so fidgety or restless that you have been moving around a lot more than usual? 0   Thoughts that you would be better off dead, or of hurting yourself in some way? 0   PHQ-9 Total Score 3   If you checked off any problems, how difficult have these problems made it for you to do your work, take care of things at home, or get along with other people? Somewhat difficult       Past Medical History:   Diagnosis Date   • Anal fissure    • RADHA (generalized anxiety disorder)    • History of alcohol dependence (CMS/HCC)         Past Surgical History:   Procedure Laterality Date   • CYSTOSCOPY     • HEMORRHOIDECTOMY     • PROCTOSCOPY  2017        Family History   Problem Relation Age of Onset   • Hypertension Father    • Prostate cancer Father    • Dementia Father    • Other Father         radiation prostatitis   • Diabetes Paternal Uncle    • Osteoarthritis Mother    • Alcohol abuse Maternal Grandmother    • Obesity Maternal Grandfather    • Other Paternal Grandmother         unknown   • Other Paternal Grandfather         unknown        Social History     Socioeconomic History   • Marital status:      Spouse name: Haresh   • Number of children: 0   • Years of education: H.S.   • Highest education level: High school graduate   Occupational History   • Occupation:      Employer: [employer]   Tobacco Use   • Smoking status: Former Smoker     Packs/day: 1.00     Years: 10.00     Pack years: 10.00     Types: Cigarettes     Last attempt to quit: 2003     Years since quittin.4   • Smokeless tobacco: Never Used   Substance and Sexual Activity   • Alcohol use: Not Currently     Frequency: Never      "Comment: Recently sober   • Drug use: No   • Sexual activity: Yes     Partners: Male     Birth control/protection: None     Comment: 1 partner in past year.         Current Outpatient Medications on File Prior to Visit   Medication Sig Dispense Refill   • cetirizine (zyrTEC) 10 MG tablet Take 10 mg by mouth Daily.     • lamoTRIgine (LaMICtal) 150 MG tablet      • [DISCONTINUED] azithromycin (ZITHROMAX) 250 MG tablet Take 1 tablet by mouth Daily. Take 2 tablets the first day, then 1 tablet daily for 4 days. 6 tablet 0   • [DISCONTINUED] buPROPion (WELLBUTRIN) 100 MG tablet TAKE 1 TABLET BY MOUTH TWICE DAILY 180 tablet 0     No current facility-administered medications on file prior to visit.        No Known Allergies     Visit Vitals  /62 (BP Location: Right arm, Patient Position: Sitting, Cuff Size: Adult)   Pulse 66   Temp 97.5 °F (36.4 °C) (Temporal)   Resp 20   Ht 167.6 cm (66\")   Wt 90.4 kg (199 lb 6.4 oz)   BMI 32.18 kg/m²      Body mass index is 32.18 kg/m².    Physical Exam   Constitutional: He is oriented to person, place, and time. He appears well-developed and well-nourished. No distress.   HENT:   Head: Atraumatic.   Eyes: EOM are normal.   Neck: Normal range of motion. Neck supple.   Cardiovascular: Normal rate, regular rhythm, normal heart sounds and intact distal pulses. Exam reveals no gallop and no friction rub.   No murmur heard.  Pulmonary/Chest: Effort normal and breath sounds normal. No stridor. No respiratory distress. He has no wheezes. He has no rales.   Abdominal: Soft. Bowel sounds are normal. He exhibits no distension and no mass. There is no tenderness. There is no rebound and no guarding. No hernia.   Musculoskeletal: He exhibits no edema.   Neurological: He is alert and oriented to person, place, and time.   Skin: Skin is warm and dry. He is not diaphoretic.   Psychiatric: He has a normal mood and affect. His behavior is normal.             Problems Addressed this Visit     "    Other    Well adult exam - Primary     The patient is here for health maintenance visit.  Currently, the patient consumes a healthy diet and has an adequate exercise regimen.  Screening lab work is ordered.  Immunizations were reviewed today.  Advice and education was given regarding nutrition, aerobic exercise, routine dental evaluations, routine eye exams, reproductive health, cardiovascular risk reduction, sunscreen use, self skin examination (annual dermatology evaluations) and seatbelt use (general overall safety).  Further recommendations will be given if needed after lab evaluation.  Annual wellness evaluation is recommended.           Relevant Orders    CBC & Differential    Comprehensive Metabolic Panel    Hemoglobin A1c    Lipid Panel    TSH Rfx On Abnormal To Free T4    CBC Auto Differential          Return in about 1 year (around 6/3/2021) for Annual.    Parts of this office note have been dictated by voice recognition software. Grammatical and/or spelling errors may be present.     Mike Restrepo MD  6/3/2020

## 2020-06-05 ENCOUNTER — TELEPHONE (OUTPATIENT)
Dept: FAMILY MEDICINE CLINIC | Facility: CLINIC | Age: 40
End: 2020-06-05

## 2020-06-05 NOTE — TELEPHONE ENCOUNTER
----- Message from Mike Restrepo MD sent at 6/4/2020  1:12 PM EDT -----  Please let the patient know that labs that were drawn at previous visit were unremarkable. Patient should continue current treatment plan.  If patient has any questions they can contact me.

## 2021-02-02 ENCOUNTER — TELEPHONE (OUTPATIENT)
Dept: FAMILY MEDICINE CLINIC | Facility: CLINIC | Age: 41
End: 2021-02-02

## 2021-02-02 NOTE — TELEPHONE ENCOUNTER
PATIENT STATES THAT HE SAW ON THE CDC WEBSITE THAT THOSE THAT HAVE HAD COVID-19 ARE SUPPOSED TO WAIT 90 DAYS BEFORE GETTING THE VACCINE. PATIENT STATES THAT HE IS SUPPOSED TO GET THE VACCINE ON THE 02/06/2021. THE PAITNET STATES THAT HE IS HAVING THE VACCINE DONE THROUGH [facility]. PLEASE CALL PATIENT OR SEND MESSAGE THROUGH MY CHART TO LET HIM KNOW IF HE SHOULD GET THE VACCINE.    CALL [phone]

## 2021-02-02 NOTE — TELEPHONE ENCOUNTER
I do not see anywhere in the patient's chart where he was diagnosed with COVID-19 so he should be able to get the vaccine as scheduled.  If he had test positive for Covid19 within the last 90 days he should wait on doing the COVID-19 vaccine per CDC website.

## 2021-05-05 ENCOUNTER — OFFICE VISIT (OUTPATIENT)
Dept: FAMILY MEDICINE CLINIC | Facility: CLINIC | Age: 41
End: 2021-05-05

## 2021-05-05 VITALS
DIASTOLIC BLOOD PRESSURE: 72 MMHG | WEIGHT: 201.8 LBS | HEART RATE: 60 BPM | BODY MASS INDEX: 31.67 KG/M2 | TEMPERATURE: 97.8 F | OXYGEN SATURATION: 98 % | SYSTOLIC BLOOD PRESSURE: 104 MMHG | HEIGHT: 67 IN | RESPIRATION RATE: 16 BRPM

## 2021-05-05 DIAGNOSIS — M25.512 ACUTE PAIN OF LEFT SHOULDER: Primary | ICD-10-CM

## 2021-05-05 DIAGNOSIS — Z80.42 FAMILY HISTORY OF PROSTATE CANCER IN FATHER: ICD-10-CM

## 2021-05-05 DIAGNOSIS — F10.21 HISTORY OF ALCOHOL DEPENDENCE (HCC): ICD-10-CM

## 2021-05-05 DIAGNOSIS — Z12.5 SCREENING FOR PROSTATE CANCER: ICD-10-CM

## 2021-05-05 LAB
ALBUMIN SERPL-MCNC: 4.6 G/DL (ref 3.5–5.2)
ALBUMIN/GLOB SERPL: 1.9 G/DL
ALP SERPL-CCNC: 72 U/L (ref 39–117)
ALT SERPL W P-5'-P-CCNC: 20 U/L (ref 1–41)
ANION GAP SERPL CALCULATED.3IONS-SCNC: 8.1 MMOL/L (ref 5–15)
AST SERPL-CCNC: 18 U/L (ref 1–40)
BASOPHILS # BLD AUTO: 0.02 10*3/MM3 (ref 0–0.2)
BASOPHILS NFR BLD AUTO: 0.4 % (ref 0–1.5)
BILIRUB SERPL-MCNC: 0.4 MG/DL (ref 0–1.2)
BUN SERPL-MCNC: 17 MG/DL (ref 6–20)
BUN/CREAT SERPL: 18.5 (ref 7–25)
CALCIUM SPEC-SCNC: 9.2 MG/DL (ref 8.6–10.5)
CHLORIDE SERPL-SCNC: 102 MMOL/L (ref 98–107)
CO2 SERPL-SCNC: 27.9 MMOL/L (ref 22–29)
CREAT SERPL-MCNC: 0.92 MG/DL (ref 0.76–1.27)
DEPRECATED RDW RBC AUTO: 42.5 FL (ref 37–54)
EOSINOPHIL # BLD AUTO: 0.09 10*3/MM3 (ref 0–0.4)
EOSINOPHIL NFR BLD AUTO: 1.9 % (ref 0.3–6.2)
ERYTHROCYTE [DISTWIDTH] IN BLOOD BY AUTOMATED COUNT: 13 % (ref 12.3–15.4)
GFR SERPL CREATININE-BSD FRML MDRD: 90 ML/MIN/1.73
GLOBULIN UR ELPH-MCNC: 2.4 GM/DL
GLUCOSE SERPL-MCNC: 87 MG/DL (ref 65–99)
HCT VFR BLD AUTO: 51 % (ref 37.5–51)
HGB BLD-MCNC: 17 G/DL (ref 13–17.7)
IMM GRANULOCYTES # BLD AUTO: 0.01 10*3/MM3 (ref 0–0.05)
IMM GRANULOCYTES NFR BLD AUTO: 0.2 % (ref 0–0.5)
LYMPHOCYTES # BLD AUTO: 1.71 10*3/MM3 (ref 0.7–3.1)
LYMPHOCYTES NFR BLD AUTO: 36.5 % (ref 19.6–45.3)
MCH RBC QN AUTO: 30 PG (ref 26.6–33)
MCHC RBC AUTO-ENTMCNC: 33.3 G/DL (ref 31.5–35.7)
MCV RBC AUTO: 89.9 FL (ref 79–97)
MONOCYTES # BLD AUTO: 0.49 10*3/MM3 (ref 0.1–0.9)
MONOCYTES NFR BLD AUTO: 10.5 % (ref 5–12)
NEUTROPHILS NFR BLD AUTO: 2.36 10*3/MM3 (ref 1.7–7)
NEUTROPHILS NFR BLD AUTO: 50.5 % (ref 42.7–76)
NRBC BLD AUTO-RTO: 0 /100 WBC (ref 0–0.2)
PLATELET # BLD AUTO: 189 10*3/MM3 (ref 140–450)
PMV BLD AUTO: 9.6 FL (ref 6–12)
POTASSIUM SERPL-SCNC: 4.4 MMOL/L (ref 3.5–5.2)
PROT SERPL-MCNC: 7 G/DL (ref 6–8.5)
PSA SERPL-MCNC: 0.68 NG/ML (ref 0–4)
RBC # BLD AUTO: 5.67 10*6/MM3 (ref 4.14–5.8)
SODIUM SERPL-SCNC: 138 MMOL/L (ref 136–145)
WBC # BLD AUTO: 4.68 10*3/MM3 (ref 3.4–10.8)

## 2021-05-05 PROCEDURE — 99213 OFFICE O/P EST LOW 20 MIN: CPT | Performed by: FAMILY MEDICINE

## 2021-05-05 PROCEDURE — G0103 PSA SCREENING: HCPCS | Performed by: FAMILY MEDICINE

## 2021-05-05 PROCEDURE — 85025 COMPLETE CBC W/AUTO DIFF WBC: CPT | Performed by: FAMILY MEDICINE

## 2021-05-05 PROCEDURE — 80053 COMPREHEN METABOLIC PANEL: CPT | Performed by: FAMILY MEDICINE

## 2021-05-05 RX ORDER — BUPROPION HYDROCHLORIDE 100 MG/1
100 TABLET, EXTENDED RELEASE ORAL DAILY
COMMUNITY
Start: 2021-03-14 | End: 2021-11-09 | Stop reason: SDUPTHER

## 2021-05-05 NOTE — ASSESSMENT & PLAN NOTE
Patient will continue supportive care at home with Tylenol.  Patient does not want to take NSAIDs of her concern for her kidney function.  Patient was given referral to physical therapy for further evaluation and treatment.  He is advised he can use Voltaren gel for symptomatic relief at home.

## 2021-05-05 NOTE — PROGRESS NOTES
Guanaco Feliciano is a 40 y.o. male who presents today for Shoulder Pain      Patient complains of posterior left shoulder pain for the past several weeks. He rates his pain as a 3/10 dull aching pain today. 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 It is better with rest but with his job he can't avoid using it every day.The pain does not radiate and he denies . He notes that hew cannot take NSAID's due to his kidneys. [facility] episode of hematuria several years ago.  He is taking Tylenol prn but not regularly and it helps some. He has not tried topicals recently. He is interested in seeing physical therapy for this problem.   Patient also requests a PSA today as his Dad has prostate cancer diagnosed in 2019. He also would like to know if there is a reliable screening for dementia as his dad has also Alzheimer's and he is concerned about developing it later. He denies urinary frequency, hematuria, blood in his stool, forgetfulness, confusion, fever, chills, or shortness of breath today.        Review of Systems   Constitutional: Negative for activity change, appetite change, chills, fatigue, fever, unexpected weight gain and unexpected weight loss.   Eyes: Negative for blurred vision.   Respiratory: Negative for shortness of breath.    Cardiovascular: Negative for chest pain and palpitations.   Gastrointestinal: Negative for blood in stool, constipation, diarrhea, nausea, rectal pain and vomiting.   Genitourinary: Negative for decreased urine volume, hematuria, nocturia, urgency and urinary incontinence.   Musculoskeletal: Positive for myalgias. Negative for arthralgias, joint swelling and neck pain.   Neurological: Negative for dizziness, syncope, numbness, headache, memory problem and confusion.        The following portions of the patient's history were reviewed and  "updated as appropriate: allergies, current medications, past family history, past medical history, past social history, past surgical history and problem list.    Current Outpatient Medications on File Prior to Visit   Medication Sig Dispense Refill   • buPROPion SR (WELLBUTRIN SR) 100 MG 12 hr tablet Take 100 mg by mouth Daily.     • lamoTRIgine (LaMICtal) 150 MG tablet      • [DISCONTINUED] cetirizine (zyrTEC) 10 MG tablet Take 10 mg by mouth Daily.       No current facility-administered medications on file prior to visit.       No Known Allergies     Visit Vitals  /72 (BP Location: Right arm, Patient Position: Sitting, Cuff Size: Adult)   Pulse 60   Temp 97.8 °F (36.6 °C) (Temporal)   Resp 16   Ht 170.2 cm (67\")   Wt 91.5 kg (201 lb 12.8 oz)   SpO2 98%   BMI 31.61 kg/m²        Physical Exam  Constitutional:       Appearance: Normal appearance. He is normal weight.   HENT:      Head: Normocephalic and atraumatic.   Cardiovascular:      Rate and Rhythm: Normal rate and regular rhythm.      Pulses: Normal pulses.      Heart sounds: Normal heart sounds.   Pulmonary:      Effort: Pulmonary effort is normal.      Breath sounds: Normal breath sounds.   Musculoskeletal:         General: No swelling, tenderness, deformity or signs of injury. Normal range of motion.      Cervical back: Normal range of motion and neck supple.   Skin:     General: Skin is warm and dry.   Neurological:      General: No focal deficit present.      Mental Status: He is alert and oriented to person, place, and time. Mental status is at baseline.   Psychiatric:         Behavior: Behavior normal.          Results for orders placed or performed in visit on 06/03/20   Comprehensive Metabolic Panel    Specimen: Arm, Left; Blood   Result Value Ref Range    Glucose 92 65 - 99 mg/dL    BUN 15 6 - 20 mg/dL    Creatinine 0.87 0.76 - 1.27 mg/dL    Sodium 138 136 - 145 mmol/L    Potassium 4.5 3.5 - 5.2 mmol/L    Chloride 102 98 - 107 mmol/L    CO2 " 30.2 (H) 22.0 - 29.0 mmol/L    Calcium 9.6 8.6 - 10.5 mg/dL    Total Protein 6.7 6.0 - 8.5 g/dL    Albumin 4.70 3.50 - 5.20 g/dL    ALT (SGPT) 15 1 - 41 U/L    AST (SGOT) 21 1 - 40 U/L    Alkaline Phosphatase 69 39 - 117 U/L    Total Bilirubin 0.4 0.2 - 1.2 mg/dL    eGFR Non African Amer 97 >60 mL/min/1.73    Globulin 2.0 gm/dL    A/G Ratio 2.4 g/dL    BUN/Creatinine Ratio 17.2 7.0 - 25.0    Anion Gap 5.8 5.0 - 15.0 mmol/L   Hemoglobin A1c    Specimen: Blood   Result Value Ref Range    Hemoglobin A1C 5.27 4.80 - 5.60 %   Lipid Panel    Specimen: Arm, Left; Blood   Result Value Ref Range    Total Cholesterol 103 0 - 200 mg/dL    Triglycerides 51 0 - 150 mg/dL    HDL Cholesterol 50 40 - 60 mg/dL    LDL Cholesterol  43 0 - 100 mg/dL    VLDL Cholesterol 10.2 5 - 40 mg/dL    LDL/HDL Ratio 0.86    TSH Rfx On Abnormal To Free T4    Specimen: Arm, Left; Blood   Result Value Ref Range    TSH 1.340 0.270 - 4.200 uIU/mL   CBC Auto Differential    Specimen: Blood   Result Value Ref Range    WBC 5.78 3.40 - 10.80 10*3/mm3    RBC 5.58 4.14 - 5.80 10*6/mm3    Hemoglobin 16.7 13.0 - 17.7 g/dL    Hematocrit 48.7 37.5 - 51.0 %    MCV 87.3 79.0 - 97.0 fL    MCH 29.9 26.6 - 33.0 pg    MCHC 34.3 31.5 - 35.7 g/dL    RDW 12.5 12.3 - 15.4 %    RDW-SD 40.3 37.0 - 54.0 fl    MPV 9.6 6.0 - 12.0 fL    Platelets 194 140 - 450 10*3/mm3    Neutrophil % 64.0 42.7 - 76.0 %    Lymphocyte % 26.0 19.6 - 45.3 %    Monocyte % 7.4 5.0 - 12.0 %    Eosinophil % 1.6 0.3 - 6.2 %    Basophil % 0.7 0.0 - 1.5 %    Immature Grans % 0.3 0.0 - 0.5 %    Neutrophils, Absolute 3.70 1.70 - 7.00 10*3/mm3    Lymphocytes, Absolute 1.50 0.70 - 3.10 10*3/mm3    Monocytes, Absolute 0.43 0.10 - 0.90 10*3/mm3    Eosinophils, Absolute 0.09 0.00 - 0.40 10*3/mm3    Basophils, Absolute 0.04 0.00 - 0.20 10*3/mm3    Immature Grans, Absolute 0.02 0.00 - 0.05 10*3/mm3    nRBC 0.0 0.0 - 0.2 /100 WBC        Problems Addressed this Visit        Family History    Family history of  prostate cancer in father    Relevant Orders    CBC & Differential    Comprehensive Metabolic Panel    PSA Screen       Mental Health    History of alcohol dependence (CMS/HCC)    Relevant Orders    CBC & Differential    Comprehensive Metabolic Panel       Musculoskeletal and Injuries    Acute pain of left shoulder - Primary     Patient will continue supportive care at home with Tylenol.  Patient does not want to take NSAIDs of her concern for her kidney function.  Patient was given referral to physical therapy for further evaluation and treatment.  He is advised he can use Voltaren gel for symptomatic relief at home.         Relevant Orders    CBC & Differential    Comprehensive Metabolic Panel    Ambulatory Referral to Physical Therapy Evaluate and treat      Other Visit Diagnoses     Screening for prostate cancer        Relevant Orders    PSA Screen      Diagnoses       Codes Comments    Acute pain of left shoulder    -  Primary ICD-10-CM: M25.512  ICD-9-CM: 719.41     Family history of prostate cancer in father     ICD-10-CM: Z80.42  ICD-9-CM: V16.42     History of alcohol dependence (CMS/HCC)     ICD-10-CM: F10.21  ICD-9-CM: 303.93     Screening for prostate cancer     ICD-10-CM: Z12.5  ICD-9-CM: V76.44           Return in about 1 year (around 5/5/2022) for Annual.    Parts of this office note have been dictated by voice recognition software. Grammatical and/or spelling errors may be present.    Mike Restrepo MD   5/5/2021

## 2021-05-06 ENCOUNTER — TELEPHONE (OUTPATIENT)
Dept: FAMILY MEDICINE CLINIC | Facility: CLINIC | Age: 41
End: 2021-05-06

## 2021-05-06 NOTE — TELEPHONE ENCOUNTER
----- Message from Mike Restrepo MD sent at 5/6/2021  3:45 PM EDT -----  Please let the patient know that labs that were drawn at previous visit were stable. Patient should continue current treatment plan.  If patient has any questions they can contact me.

## 2021-09-10 DIAGNOSIS — Z13.39 ADHD (ATTENTION DEFICIT HYPERACTIVITY DISORDER) EVALUATION: Primary | ICD-10-CM

## 2021-10-04 ENCOUNTER — TELEPHONE (OUTPATIENT)
Dept: FAMILY MEDICINE CLINIC | Facility: CLINIC | Age: 41
End: 2021-10-04

## 2021-10-04 NOTE — TELEPHONE ENCOUNTER
----- Message from Robert Feliciano sent at 10/4/2021  5:20 PM EDT -----  Regarding: RE: Visit Follow-Up Question  Contact: [phone]  They're saying in-person only.     ----- Message -----  From: Mike Restrepo MD  Sent: 10/1/21 6:13 PM  To: Robert Feliciano  Subject: RE: Visit Follow-Up Question    If they are not able to see you through telemedicine let me know and I can put in a new referral. It may have been a glitch in the system. Keep me posted.       ----- Message -----       From:Robert Feliciano       Sent:10/1/2021  1:49 PM EDT         To:Mike Restrepo MD    Subject:RE: Visit Follow-Up Question    The clinic informed me it would have to be in person. I will follow up to confirm telemedicine format.    Apologies. I'm just getting conflicting information.    Mat      ----- Message -----       From:Mike Restrepo MD       Sent:10/1/2021  1:38 PM EDT         To:Robert Feliciano    Subject:RE: Visit Follow-Up Question    The clinic in [place] through [facility] should see you through telemedicine and will not require you to travel down there for anything.       ----- Message -----       From:Robert Feliciano       Sent:9/30/2021  7:27 PM EDT         To:Mike Restrepo MD    Subject:Visit Follow-Up Question    Good evening Dr. Restrepo,    The [facility] is in [place], which is a three-hour round trip for me. Is there another place in [place] that can perform ADHD assessments?    Thank you,  Mat

## 2021-10-21 ENCOUNTER — TELEPHONE (OUTPATIENT)
Dept: FAMILY MEDICINE CLINIC | Facility: CLINIC | Age: 41
End: 2021-10-21

## 2021-10-21 NOTE — TELEPHONE ENCOUNTER
----- Message from Amanda G Pallazola, MA sent at 10/21/2021 10:34 AM EDT -----  Regarding: FW: ADHD Assessment  Can you look at this for me, thank you.  ----- Message -----  From: Robert Feliciano  Sent: 10/21/2021   9:52 AM EDT  To: [facility]  Subject: ADHD Assessment                                  Good morning Dr. Restrepo,    I still haven't received a call regarding my telemedicine appointment from [place]. The messages I receive reference an in-person assessment in [place] on Oct. 28, which I can't make.     Please let me know if you have heard any updates from your referral coordinator.    Thank you.

## 2021-10-28 ENCOUNTER — TELEPHONE (OUTPATIENT)
Dept: FAMILY MEDICINE CLINIC | Facility: CLINIC | Age: 41
End: 2021-10-28

## 2021-11-09 RX ORDER — BUPROPION HYDROCHLORIDE 100 MG/1
100 TABLET, EXTENDED RELEASE ORAL DAILY
Qty: 90 TABLET | Refills: 3 | Status: SHIPPED | OUTPATIENT
Start: 2021-11-09 | End: 2021-11-11 | Stop reason: SDUPTHER

## 2021-11-11 RX ORDER — BUPROPION HYDROCHLORIDE 100 MG/1
100 TABLET, EXTENDED RELEASE ORAL DAILY
Qty: 90 TABLET | Refills: 3 | Status: SHIPPED | OUTPATIENT
Start: 2021-11-11 | End: 2021-11-15 | Stop reason: SDUPTHER

## 2021-11-15 RX ORDER — BUPROPION HYDROCHLORIDE 100 MG/1
100 TABLET, EXTENDED RELEASE ORAL DAILY
Qty: 90 TABLET | Refills: 3 | Status: SHIPPED | OUTPATIENT
Start: 2021-11-15 | End: 2021-11-17

## 2021-11-17 ENCOUNTER — TELEMEDICINE (OUTPATIENT)
Dept: PSYCHIATRY | Facility: CLINIC | Age: 41
End: 2021-11-17

## 2021-11-17 DIAGNOSIS — F39 UNSPECIFIED MOOD (AFFECTIVE) DISORDER (HCC): Primary | Chronic | ICD-10-CM

## 2021-11-17 DIAGNOSIS — G47.9 SLEEPING DIFFICULTIES: ICD-10-CM

## 2021-11-17 DIAGNOSIS — R45.4 IRRITABILITY: ICD-10-CM

## 2021-11-17 DIAGNOSIS — F41.9 ANXIETY DISORDER, UNSPECIFIED TYPE: Chronic | ICD-10-CM

## 2021-11-17 DIAGNOSIS — R45.86 MOOD SWINGS: ICD-10-CM

## 2021-11-17 DIAGNOSIS — R41.840 ATTENTION DEFICIT: ICD-10-CM

## 2021-11-17 PROCEDURE — 90792 PSYCH DIAG EVAL W/MED SRVCS: CPT | Performed by: NURSE PRACTITIONER

## 2021-11-17 RX ORDER — BUPROPION HYDROCHLORIDE 150 MG/1
150 TABLET ORAL DAILY
Qty: 30 TABLET | Refills: 0 | Status: SHIPPED | OUTPATIENT
Start: 2021-11-17 | End: 2022-01-19 | Stop reason: SDUPTHER

## 2021-11-17 NOTE — PROGRESS NOTES
This provider is located at the [facility] (through [facility]), [address], [facility], [address] using a secure [employer] [facility] Video Visit through [employer] Patient is being seen remotely via telehealth at their home address in [place], and stated they are in a secure environment for this session. The patient's condition being diagnosed/treated is appropriate for telemedicine. The provider identified herself as well as her credentials.   The patient, and/or patients guardian, consent to be seen remotely, and when consent is given they understand that the consent allows for patient identifiable information to be sent to a third party as needed.   They may refuse to be seen remotely at any time. The electronic data is encrypted and password protected, and the patient and/or guardian has been advised of the potential risks to privacy not withstanding such measures.    You have chosen to receive care through a telehealth visit.  Do you consent to use a video/audio connection for your medical care today? Yes        Subjective   Robert Feliciano is a 41 y.o. male who presents today for initial evaluation     Chief Complaint:  Depression, anxiety, attention deficit, mood swings, irritability, sleeping difficulties       History of Present Illness:  The patient presents today for initial evaluation for medication management.  The patient was referred by PCP.  The patient states that he was diagnosed with bipolar II disorder and has been receiving treatment for this for the past 3 years, but states that he is seeking a second opinion regarding his mental health diagnoses and treatment.  Through the entirety of the evaluation, the patient appeared to be a poor historian and struggled significantly describing symptoms.  The patient endorses significant symptoms of bipolar disorder including: decreased need for sleep, increased talkativeness, flight of ideas or racing thoughts,  distractibility and increase in goal directed activity or psychomotor agitation which have caused impairment in important areas of daily functioning.  The patient has difficulty explaining symptoms and their onset.  The patient rates their symptoms at a 6-7/10 on a 0-10 scale, with 10 being the worst.  The patient endorses that these symptoms typically last for a few days when they occur, but the patient states that the distractibility is present all of the time and has been and issue for him since childhood.  The patient endorses symptoms of ADHD including, but not limited to: trouble keeping attention on tasks and during hobbies or leisure activities, does not follow instructions and fails to finish homework chores daily tasks or duties at work, avoids dislikes or doesn't want to do things that require mental effort for a long period of time, easily distracted, forgetful in daily activities, often is restless, is often on the go or often acts is if driven by a motor and often interrupts or intrudes on others which have caused impairment in important areas of daily functioning.  The patient has had symptoms of ADHD since early childhood.  The patient/guardian rates their ADHD at a 6-7/10 on a 0-10 scale, with 10 being the worst.  The patient states that he is even concerned that he may have a learning disorder because he has such difficulty with comprehension and retention.  The patient endorses that is is extremely difficult for him to learn new things and states that he becomes very irritable and agitated when trying to do so.  The patient states that irritability is a significant issue for him.  The patient exhibited irritability during today's encounter, especially so when he had difficulty explaining things.  The patient states that the irritability tends to occur more so whenever he is overwhelmed or anxious.  The patient endorses significant symptoms of anxiety including: excessive anxiety and worry about a  "number of events or activities for more days than not, restlessness or feeling keyed up, being easily fatigued, difficulty concentrating or mind going blank, irritability, muscle tension and sleep disturbance which have caused impairment in important areas of daily functioning.  The patient has had symptoms of anxiety for as long as he can remember. The patient rates their anxiety at a 8/10 on a 0-10 scale, with 10 being the worst.  The patient reports that he has struggled with depression for many years now and states that he thinks that depressive symptoms began when he stopped using alcohol.  The patient endorses significant symptoms of depression including: changes in sleep, reduced interests in activities, feelings of guilt, changes in energy level, difficulty with concentration, change in appetite and psychomotor changes which have caused impairment in important areas of daily functioning.  The patient rates their depression at a 5/10 on a 0-10 scale, with 10 being the worst.  The patient states that he thinks he will always struggle with depression due to his history of alcoholism.  The patient states that he thinks that alfredo better whenever he is busy and \"things are structured\".  The patient states that he used to fill idle time with drinking and endorses that since he is sober the idle time is a source of depression for him.  The patient states that his sleep fluctuates with his moods.  He states that some nights he sleep well and other nights he is very restless and cannot sleep.  The patient states that the nights he is more restless and cannot sleep are during the times he is exhibiting mood fluctuations.  The patient denies any abnormal muscle movements or tics.  The patient denies suicidal ideations and homicidal ideations, and is convincing.  The patient denies any auditory hallucinations or visual hallucinations.  The patient does not endorse significant symptoms consistent with a psychotic " illness.                          Current Psychiatric Medications:  Wellbutrin  mg daily - the patient states that he has been taking this medication for over 10 years.  The patient states that he thinks it is somewhat effective for his depression.     Prior Psychiatric Medications:  Lamictal 200 mg daily - the patient states that he was started on this medication in 2019 and discontinued it approximately 2-3 weeks ago.  The patient states that he was started on this medication by his PMHNP after she diagnosed him with bipolar II disorder.  The patient states that he felt like his mood was better when he was taking this medication, but states that he did not like that the medication made him feel more fatigued.     Currently in Counseling or Therapy:  The patient states that he is currently in therapy with Tali Avalos LCSW.  The patient states that he has been established in therapy for approximately one year now.     Prior Psychiatric Outpatient Care:  The patient states that he began therapy initially in adolescence. The patient states that he has been in and out of therapy and counseling since high school.  The patient states that he began taking psychiatric medications approximately 10 years ago when he was started on Wellbutrin.  The patient states that he thinks he was seeing his PCP for psychiatric medication management at that time.  The patient states that he established with a psychiatric nurse practitioner at [facility] (previously [facility]) approximately 3 years ago.  The patient states that he continued psychiatric medication management there until this past August.  The patient states that he chose to discontinue treatment with this provider in order to seek a second opinion.    Prior Psychiatric Hospitalizations:  Denies    Previous Suicide Attempts:  Denies    Previous Self-Harming Behavior:  Denies    Any family history of suicide  attempts:  Denies    Legal History, Arrests, or Incarcerations:  No current legal charges pending.  Denies any history of arrests or incarcerations.     History of Seizures or TBI:  Denies    Highest Level of Education:  Graduated from the [employer] with a BA in journalism in 2000    Employment:  Outbound  at [employer]     [employer] History:  Denies    Substance Abuse History:  Alcohol: The patient reports a history of alcohol abuse.  The patient states that he began abusing alcohol in his early 20s and continued for approximately 11 years.  The patient states that he got sober and stopped abusing alcohol after 11 years and remained sober for 8 years before relapsing.  The patient states that he relapsed in June 2020, but endorses that he is 533 days sober currently.     Smoking/Cigarettes: The patient reports a history of smoking cigarettes for approximately 10 years.  The patient states that he smoked approximately 1 PPD from 3409-5757.  The patient denies any smoking since 2003.  Vaping: Denies  Smokeless Tobacco: Denies  Illicit Substances: The patient reports a history of using marijuana recreationally in the past.  The patient states that he used marijuana recreationally, but endorses that it was very infrequent.  The patient states that he stopped using marijuana in 2009.  The patient denies any other history of illicit substance abuse.   Prescription Medication Misuse: Denies    Social History:  Born: [place], [place]  The patient states that he was born and raised in [place], [place].  The patient states that he relocated to [place], [place] from [address] for college.  The patient states that upon completion of college he relocated back to [facility].  The patient states that he relocated back to [place], [place] in 2010 and has resided there since that time.   Marriage status:  x4 years  Children: None  Lives with: The patient's currently household consists of the patient and his  .     Abuse History:  Verbal: Denies  Emotional: Denies  Mental: Denies  Physical: Denies  Sexual: Denies  Rape: Denies  Other: Denies    Patient's Support Network Includes:   and friends          The following portions of the patient's history were reviewed and updated as appropriate: allergies, current medications, past family history, past medical history, past social history, past surgical history and problem list.          Past Medical History:  Past Medical History:   Diagnosis Date   • Allergic    • Anal fissure    • Bipolar disorder ([facility])    • RADHA (generalized anxiety disorder)    • History of alcohol dependence ([facility])        Social History:  Social History     Socioeconomic History   • Marital status:      Spouse name: Haresh   • Number of children: 0   • Years of education: H.S.   • Highest education level: High school graduate   Tobacco Use   • Smoking status: Former Smoker     Packs/day: 1.00     Years: 10.00     Pack years: 10.00     Types: Cigarettes     Start date:      Quit date:      Years since quittin.8   • Smokeless tobacco: Never Used   Vaping Use   • Vaping Use: Never used   Substance and Sexual Activity   • Alcohol use: Not Currently     Comment: Recently sober   • Drug use: Not Currently     Types: Marijuana   • Sexual activity: Yes     Partners: Male     Birth control/protection: None     Comment: 1 partner in past year.        Family History:  Family History   Problem Relation Age of Onset   • Hypertension Father    • Prostate cancer Father    • Dementia Father    • Other Father         radiation prostatitis   • Anxiety disorder Father    • Diabetes Paternal Uncle    • Osteoarthritis Mother    • Anxiety disorder Mother    • Depression Mother    • Alcohol abuse Maternal Grandmother    • Obesity Maternal Grandfather    • Other Paternal Grandmother         unknown   • Other Paternal Grandfather         unknown       Past Surgical History:  Past  Surgical History:   Procedure Laterality Date   • CYSTOSCOPY  2017   • HEMORRHOIDECTOMY  2015   • PROCTOSCOPY  2017       Problem List:  Patient Active Problem List   Diagnosis   • Bipolar 2 disorder, major depressive episode (HCC)   • History of alcohol dependence (HCC)   • Well adult exam   • Family history of prostate cancer in father   • Acute pain of left shoulder       Allergy:   No Known Allergies     Current Medications:   Current Outpatient Medications   Medication Sig Dispense Refill   • buPROPion XL (WELLBUTRIN XL) 150 MG 24 hr tablet Take 1 tablet by mouth Daily. 30 tablet 0     No current facility-administered medications for this visit.       Review of Symptoms:    Review of Systems   Constitutional: Positive for activity change and fatigue. Negative for appetite change, unexpected weight gain and unexpected weight loss.   Psychiatric/Behavioral: Positive for agitation, decreased concentration, dysphoric mood, sleep disturbance, positive for hyperactivity, depressed mood and stress. Negative for behavioral problems, hallucinations, self-injury and suicidal ideas. The patient is nervous/anxious.          Physical Exam:   There were no vitals taken for this visit. There is no height or weight on file to calculate BMI.     The patient was seen remotely today via a [facility] Video Visit through [facility].  Unable to obtain vital signs due to nature of remote visit.  Height stated at 67 inches.  Weight stated at 198 pounds.     Physical Exam  Constitutional:       Appearance: Normal appearance.   Neurological:      Mental Status: He is alert.   Psychiatric:         Attention and Perception: Perception normal. He is inattentive.         Mood and Affect: Mood is anxious and depressed. Affect is flat.         Speech: Speech normal.         Behavior: Behavior is agitated. Behavior is cooperative.         Thought Content: Thought content normal. Thought content does not include homicidal or suicidal ideation. Thought  content does not include homicidal or suicidal plan.         Cognition and Memory: Cognition and memory normal.         Judgment: Judgment is impulsive.           Mental Status Exam:   Hygiene:   good  Cooperation:  Guarded  Eye Contact:  Good  Psychomotor Behavior:  Restless  Affect:  Blunted  Mood: anxious and irritable  Hopelessness: Denies  Speech:  Rambling  Thought Process:  Goal directed  Thought Content:  Normal  Suicidal:  None  Homicidal:  None  Hallucinations:  None  Delusion:  None  Memory:  Intact  Orientation:  Person, Place, Time and Situation  Reliability:  fair  Insight:  Fair  Judgement:  Fair  Impulse Control:  Fair  Physical/Medical Issues:  Yes See medical history         PHQ-9 Depression Screening  Little interest or pleasure in doing things? (P) 1   Feeling down, depressed, or hopeless? (P) 1   Trouble falling or staying asleep, or sleeping too much? (P) 3   Feeling tired or having little energy? (P) 1   Poor appetite or overeating? (P) 1   Feeling bad about yourself - or that you are a failure or have let yourself or your family down? (P) 1   Trouble concentrating on things, such as reading the newspaper or watching television? (P) 3   Moving or speaking so slowly that other people could have noticed? Or the opposite - being so fidgety or restless that you have been moving around a lot more than usual? (P) 1   Thoughts that you would be better off dead, or of hurting yourself in some way? (P) 0   PHQ-9 Total Score (P) 12   If you checked off any problems, how difficult have these problems made it for you to do your work, take care of things at home, or get along with other people? (P) Not difficult at all        PHQ-9 Total Score: (P) 12     RADHA-7  Feeling nervous, anxious or on edge: (P) Nearly every day  Not being able to stop or control worrying: (P) Nearly every day  Worrying too much about different things: (P) More than half the days  Trouble Relaxing: (P) Nearly every day  Being so  restless that it is hard to sit still: (P) More than half the days  Feeling afraid as if something awful might happen: (P) More than half the days  Becoming easily annoyed or irritable: (P) More than half the days  RADHA 7 Total Score: (P) 17  If you checked any problems, how difficult have these problems made it for you to do your work, take care of things at home, or get along with other people: (P) Somewhat difficult       PROMIS scale screening tool that patient filled out virtually reviewed by this APRN at today's encounter.     Past available notes from PCP reviewed by this APRN at today's encounter.    The [facility] report, request number [id number], of the past 12 months were reviewed.    Mood disorder questionnaire completed with patient by this APRN at today's encounter.  See flowsheets.    ADHD adult symptom checklist completed with patient by this APRN at today's encounter.  See flowsheets.        Lab Results:   No visits with results within 1 Month(s) from this visit.   Latest known visit with results is:   Office Visit on 05/05/2021   Component Date Value Ref Range Status   • Glucose 05/05/2021 87  65 - 99 mg/dL Final   • BUN 05/05/2021 17  6 - 20 mg/dL Final   • Creatinine 05/05/2021 0.92  0.76 - 1.27 mg/dL Final   • Sodium 05/05/2021 138  136 - 145 mmol/L Final   • Potassium 05/05/2021 4.4  3.5 - 5.2 mmol/L Final   • Chloride 05/05/2021 102  98 - 107 mmol/L Final   • CO2 05/05/2021 27.9  22.0 - 29.0 mmol/L Final   • Calcium 05/05/2021 9.2  8.6 - 10.5 mg/dL Final   • Total Protein 05/05/2021 7.0  6.0 - 8.5 g/dL Final   • Albumin 05/05/2021 4.60  3.50 - 5.20 g/dL Final   • ALT (SGPT) 05/05/2021 20  1 - 41 U/L Final   • AST (SGOT) 05/05/2021 18  1 - 40 U/L Final   • Alkaline Phosphatase 05/05/2021 72  39 - 117 U/L Final   • Total Bilirubin 05/05/2021 0.4  0.0 - 1.2 mg/dL Final   • eGFR Non African Amer 05/05/2021 90  >60 mL/min/1.73 Final   • Globulin 05/05/2021 2.4  gm/dL Final   • A/G Ratio 05/05/2021 1.9   g/dL Final   • BUN/Creatinine Ratio 05/05/2021 18.5  7.0 - 25.0 Final   • Anion Gap 05/05/2021 8.1  5.0 - 15.0 mmol/L Final   • PSA 05/05/2021 0.681  0.000 - 4.000 ng/mL Final   • WBC 05/05/2021 4.68  3.40 - 10.80 10*3/mm3 Final   • RBC 05/05/2021 5.67  4.14 - 5.80 10*6/mm3 Final   • Hemoglobin 05/05/2021 17.0  13.0 - 17.7 g/dL Final   • Hematocrit 05/05/2021 51.0  37.5 - 51.0 % Final   • MCV 05/05/2021 89.9  79.0 - 97.0 fL Final   • MCH 05/05/2021 30.0  26.6 - 33.0 pg Final   • MCHC 05/05/2021 33.3  31.5 - 35.7 g/dL Final   • RDW 05/05/2021 13.0  12.3 - 15.4 % Final   • RDW-SD 05/05/2021 42.5  37.0 - 54.0 fl Final   • MPV 05/05/2021 9.6  6.0 - 12.0 fL Final   • Platelets 05/05/2021 189  140 - 450 10*3/mm3 Final   • Neutrophil % 05/05/2021 50.5  42.7 - 76.0 % Final   • Lymphocyte % 05/05/2021 36.5  19.6 - 45.3 % Final   • Monocyte % 05/05/2021 10.5  5.0 - 12.0 % Final   • Eosinophil % 05/05/2021 1.9  0.3 - 6.2 % Final   • Basophil % 05/05/2021 0.4  0.0 - 1.5 % Final   • Immature Grans % 05/05/2021 0.2  0.0 - 0.5 % Final   • Neutrophils, Absolute 05/05/2021 2.36  1.70 - 7.00 10*3/mm3 Final   • Lymphocytes, Absolute 05/05/2021 1.71  0.70 - 3.10 10*3/mm3 Final   • Monocytes, Absolute 05/05/2021 0.49  0.10 - 0.90 10*3/mm3 Final   • Eosinophils, Absolute 05/05/2021 0.09  0.00 - 0.40 10*3/mm3 Final   • Basophils, Absolute 05/05/2021 0.02  0.00 - 0.20 10*3/mm3 Final   • Immature Grans, Absolute 05/05/2021 0.01  0.00 - 0.05 10*3/mm3 Final   • nRBC 05/05/2021 0.0  0.0 - 0.2 /100 WBC Final         Assessment/Plan   Diagnoses and all orders for this visit:    1. Unspecified mood (affective) disorder (HCC) (Primary)  -     buPROPion XL (WELLBUTRIN XL) 150 MG 24 hr tablet; Take 1 tablet by mouth Daily.  Dispense: 30 tablet; Refill: 0    2. Anxiety disorder, unspecified type  -     buPROPion XL (WELLBUTRIN XL) 150 MG 24 hr tablet; Take 1 tablet by mouth Daily.  Dispense: 30 tablet; Refill: 0    3. Sleeping difficulties    4. Mood  swings    5. Irritability    6. Attention deficit  -     buPROPion XL (WELLBUTRIN XL) 150 MG 24 hr tablet; Take 1 tablet by mouth Daily.  Dispense: 30 tablet; Refill: 0        Visit Diagnoses:    ICD-10-CM ICD-9-CM   1. Unspecified mood (affective) disorder ([facility])  F39 296.90   2. Anxiety disorder, unspecified type  F41.9 300.00   3. Sleeping difficulties  G47.9 780.50   4. Mood swings  R45.86 799.24   5. Irritability  R45.4 799.22   6. Attention deficit  R41.840 799.51          GOALS:  Short Term Goals: Patient will be compliant with medication, and patient will have no significant medication related side effects.  Patient will be engaged in psychotherapy as indicated.  Patient will report subjective improvement of symptoms.  Long term goals: To stabilize mood and treat/improve subjective symptoms, the patient will stay out of the hospital, the patient will be at an optimal level of functioning, and the patient will take all medications as prescribed.  The patient verbalized understanding and agreement with goals that were mutually set.      SUICIDE RISK ASSESSMENT: Unalterable demographics and a history of mental health intervention indicate this patient is in a high risk category compared to the general population. At present, the patient denies active SI/HI, intentions, or plans at this time and agrees to seek immediate care should such thoughts develop. The patient verbalizes understanding of how to access emergency care if needed and agrees to do so. Consideration of suicide risk and protective factors such as history, current presentation, individual strengths and weaknesses, psychosocial and environmental stressors and variables, psychiatric illness and symptoms, medical conditions and pain, took place in this interview. Based on those considerations, the patient is determined: within individual baseline and presenting no imminent risk for suicide or homicide. Other recommendations: The patient does not meet  the criteria for inpatient admission and is not a safety risk to self or others at today's visit. Inpatient treatment offers no significant advantages over outpatient treatment for this patient at today's visit.      SAFETY PLAN:  Patient was given ample time for questions and fully participated in treatment planning.  Patient was encouraged to call the clinic with any questions or concerns.  Patient was informed of access to emergency care. If patient were to develop any significant symptomatology, suicidal ideation, homicidal ideation, any concerns, or feel unsafe at any time they are to call the clinic and if unable to get immediate assistance should immediately call 911 or go to the nearest emergency room.  The patient is advised to remove or secure (lock away) all lethal weapons (including guns) and sharps (including razors, scissors, knives, etc.).  All medications (including any prescribed and any over the counter medications) should be stored in a safe and secured location that is not obtainable by children/adolescents.  Patient was given an opportunity and encouraged to ask questions about their medication, illness, and treatment. Patient contracted verbally for the following: If you are experiencing an emotional crisis or have thoughts of harming yourself or others, please go to your nearest local emergency room or call 911. Will continue to re-assess medication response and side effects frequently to establish efficacy and ensure safety. Risks, any black box warnings, side effects, off label usage, and benefits of medication and treatment discussed with patient, along with potential adverse side effects of current and/or newly prescribed medication, alternative treatment options, and OTC medications.  Patient verbalized understanding of potential risks, any off label use of medication, any black box warnings, and any side effects in their own words. The patient verbalized understanding and agreed to  comply with the safety plan discussed in their own words.  Patient given the number to the office. Number also available to the 24- hour suicide hotline.      TREATMENT PLAN: Continue supportive psychotherapy efforts and medications as indicated.  Medication and treatment options, both pharmacological and non-pharmacological treatment options, discussed during today's visit, including any off label use of medication. Patient acknowledged and verbally consented with current treatment plan and was educated on the importance of compliance with treatment and follow-up appointments.          -Discontinue Wellbutrin  mg daily due to inefficacy  -Begin Wellbutrin  mg daily for depression, anxiety, and attention deficit  -Discussed with the patient that based on today's evaluation the provider cannot definitively determine accurate diagnoses.  Discussed with the patient that the array of symptoms he has endorsed are consistent with the diagnostic criteria for several different diagnoses, such as a mood/bipolar disorder and ADHD.  Discussed with patient that the provider recommends psychological testing in order to definitively determine diagnoses and better guide treatment recommendations.  The patient verbalizes understanding and endorses that he is agreeable to this.         MEDICATION ISSUES: Discussed medication options and treatment plan of prescribed medication, any off label use of medication, as well as the risks, benefits, any black box warnings including increased suicidality, and side effects including but not limited to potential falls, dizziness, possible impaired driving, GI side effects (change in appetite, abdominal discomfort, nausea, vomiting, diarrhea, and/or constipation), dry mouth, somnolence, sedation, insomnia, activation, agitation, irritation, tremors, abnormal muscle movements or disorders, headache, sweating, possible bruising or rare bleeding, electrolyte and/or fluid abnormalities,  change in blood pressure/heart rate/and or heart rhythm, sexual dysfunction, and metabolic adversities among others. Patient and/or guardian agreeable to call the office with any worsening of symptoms or onset of side effects, or if any concerns or questions arise.  The contact information for the office is made available to the patient and/or guardian.  Patient and/or guardian agreeable to call 911 or go to the nearest ER should they begin having any SI/HI, or if any urgent concerns arise. No medication side effects or related complaints today.                [facility] No Show Policy:  We understand unexpected circumstances arise; however, anytime you miss your appointment we are unable to provide you appropriate care.  In addition, each appointment missed could have been used to provide care for others.  We ask that you call at least 24 hours in advance to cancel or reschedule an appointment.  We would like to take this opportunity to remind you of our policy stating patients who miss THREE or more appointments without cancelling or rescheduling 24 hours in advance of the appointment may be subject to cancellation of any further visits with our clinic and recommendation to seek in-person services/visits.    Please call [phone] to reschedule your appointment. If there are reasons that make it difficult for you to keep the appointments, please call and let us know how we can help.  Please understand that medication prescribing will not continue without seeing your provider.      [facility]'s No Show Policy reviewed with patient at today's visit. Patient verbalized understanding of this policy. Discussed with patient that in the event that there are three or more no show visits, it will be recommended that they pursue in-person services/visits as noncompliance with telehealth visits indicates that patient is not an appropriate candidate for telemedicine and would  likely be more appropriate for in-person services/visits. Patient verbalizes understanding and is agreeable to this.          MEDS ORDERED DURING VISIT:  New Medications Ordered This Visit   Medications   • buPROPion XL (WELLBUTRIN XL) 150 MG 24 hr tablet     Sig: Take 1 tablet by mouth Daily.     Dispense:  30 tablet     Refill:  0       Return in about 4 weeks (around 12/15/2021), or if symptoms worsen or fail to improve, for Recheck.        Patient will follow-up in 4 weeks, highly encouraged the patient if he had any questions or concerns to contact the behavioral health [facility] clinic for sooner appointment patient verbalized understanding.      Functional Status: Moderate impairment     Prognosis: Guarded with Ongoing Treatment             This document has been electronically signed by ELMO Last  November 18, 2021 09:02 EST    Part of this note may be an electronic transcription/translation of spoken language to printed text using the Dragon Dictation System.

## 2022-01-19 ENCOUNTER — TELEMEDICINE (OUTPATIENT)
Dept: PSYCHIATRY | Facility: CLINIC | Age: 42
End: 2022-01-19

## 2022-01-19 ENCOUNTER — TELEPHONE (OUTPATIENT)
Dept: PSYCHIATRY | Facility: CLINIC | Age: 42
End: 2022-01-19

## 2022-01-19 DIAGNOSIS — R41.840 ATTENTION DEFICIT: ICD-10-CM

## 2022-01-19 DIAGNOSIS — R45.4 IRRITABILITY: ICD-10-CM

## 2022-01-19 DIAGNOSIS — R45.86 MOOD SWINGS: ICD-10-CM

## 2022-01-19 DIAGNOSIS — F39 UNSPECIFIED MOOD (AFFECTIVE) DISORDER: Primary | Chronic | ICD-10-CM

## 2022-01-19 DIAGNOSIS — G47.9 SLEEPING DIFFICULTIES: ICD-10-CM

## 2022-01-19 DIAGNOSIS — F41.9 ANXIETY DISORDER, UNSPECIFIED TYPE: Chronic | ICD-10-CM

## 2022-01-19 PROCEDURE — 99214 OFFICE O/P EST MOD 30 MIN: CPT | Performed by: NURSE PRACTITIONER

## 2022-01-19 PROCEDURE — 90833 PSYTX W PT W E/M 30 MIN: CPT | Performed by: NURSE PRACTITIONER

## 2022-01-19 RX ORDER — BUPROPION HYDROCHLORIDE 150 MG/1
150 TABLET ORAL DAILY
Qty: 30 TABLET | Refills: 0 | Status: SHIPPED | OUTPATIENT
Start: 2022-01-19 | End: 2022-01-19 | Stop reason: SDUPTHER

## 2022-01-19 RX ORDER — BUPROPION HYDROCHLORIDE 300 MG/1
300 TABLET ORAL DAILY
Qty: 30 TABLET | Refills: 0 | Status: SHIPPED | OUTPATIENT
Start: 2022-01-19 | End: 2022-02-16 | Stop reason: SDUPTHER

## 2022-01-19 NOTE — PROGRESS NOTES
"This provider is located at the Behavioral [facility] (through [facility]), [address], [place], [address] using a secure [employer] Video Visit through [employer]. Patient is being seen remotely via telehealth at their home address in [place], and stated they are in a secure environment for this session. The patient's condition being diagnosed/treated is appropriate for telemedicine. The provider identified herself as well as her credentials. The patient, and/or patients guardian, consent to be seen remotely, and when consent is given they understand that the consent allows for patient identifiable information to be sent to a third party as needed. They may refuse to be seen remotely at any time. The electronic data is encrypted and password protected, and the patient and/or guardian has been advised of the potential risks to privacy not withstanding such measures.    You have chosen to receive care through a telehealth visit.  Do you consent to use a video/audio connection for your medical care today? Yes     Subjective   Robert Feliciano is a 41 y.o. male who is here today for medication management follow up.    Chief Complaint: Depression, anxiety, mood swings, irritability, attention deficit, sleeping difficulties     History of Present Illness:  The patient describes his mood as \"okay\" over the last few weeks.  Patient states that overall he has been doing okay over the last few weeks.  The patient states that he has been on Wellbutrin XL now for approximately 2 months.  The patient states that he has been having some difficulties with focus and concentration.  The patient states that he would rate his difficulties with focus and concentration at 7/10 on a 0-10 scale, with 10 being the worst.  The patient states that he has had some situational stressors that have contributed to his depression and anxiety recently.  The patient states that his father was in the hospital for over " a month during the holidays and endorses that this was a stressful time for him.  The patient states that his father is back home now and doing well.  The patient states that he was able to take a vacation with his spend recently and endorses that this was not stable to relax.  The patient states that yesterday was his first day back to work after being off for vacation.  The patient reports his appetite as good.  The patient reports his sleep as good.  The patient denies any nightmares or bad dreams.  The patient denies any new medical problems or changes in medications since last appointment with this facility.  The patient reports compliance with current medication regimen.  The patient denies any current side effects from his current medication regimen.  The patient denies any abnormal muscle movements or tics.  The patient rates his depression at a 6-7/10 on a 0-10 scale, with 10 being the worst.  The patient rates his anxiety at a 5-6/10 on a 0-10 scale, with 10 being the worst.  The patient rates hopelessness at a 0/10 on a 0-10 scale with 10 being the worst.  The patient denies suicidal ideations and homicidal ideations, and is convincing.  The patient denies any auditory hallucinations or visual hallucinations.  The patient does not endorse significant symptoms consistent with bipolar disorder or a psychotic illness.                  Prior Psychiatric Medications:  Lamictal 200 mg daily - the patient states that he was started on this medication in 2019 and discontinued it approximately 2-3 weeks ago.  The patient states that he was started on this medication by his PMHNP after she diagnosed him with bipolar II disorder.  The patient states that he felt like his mood was better when he was taking this medication, but states that he did not like that the medication made him feel more fatigued.       The following portions of the patient's history were reviewed and updated as appropriate: allergies, current  medications, past family history, past medical history, past social history, past surgical history and problem list.    Review of Systems   Constitutional: Positive for fatigue. Negative for activity change, appetite change and unexpected weight change.   Psychiatric/Behavioral: Positive for decreased concentration and dysphoric mood. Negative for agitation, behavioral problems, confusion, hallucinations, self-injury, sleep disturbance and suicidal ideas. The patient is nervous/anxious. The patient is not hyperactive.        Objective   Physical Exam  Constitutional:       Appearance: Normal appearance.   Neurological:      Mental Status: He is alert.   Psychiatric:         Attention and Perception: Perception normal. He is inattentive.         Mood and Affect: Affect normal. Mood is anxious and depressed.         Speech: Speech normal.         Behavior: Behavior normal. Behavior is cooperative.         Thought Content: Thought content normal. Thought content does not include homicidal or suicidal ideation. Thought content does not include homicidal or suicidal plan.         Cognition and Memory: Cognition and memory normal.         Judgment: Judgment normal.       There were no vitals taken for this visit.    The patient was seen remotely today via a MyChart Video Visit through [facility].  Unable to obtain vital signs due to nature of remote visit.  Height stated at 67 inches.  Weight stated at 198 pounds.     No Known Allergies    No results found for this or any previous visit (from the past 2016 hour(s)).    Current Medications:   Current Outpatient Medications   Medication Sig Dispense Refill   • buPROPion XL (WELLBUTRIN XL) 300 MG 24 hr tablet Take 1 tablet by mouth Daily. 30 tablet 0     No current facility-administered medications for this visit.       Mental Status Exam:   Hygiene:   good  Cooperation:  Cooperative  Eye Contact:  Good  Psychomotor Behavior:  Appropriate  Affect:  Full range  Hopelessness:  Denies  Speech:  Normal  Thought Process:  Goal directed  Thought Content:  Normal  Suicidal:  None  Homicidal:  None  Hallucinations:  None  Delusion:  None  Memory:  Intact  Orientation:  Person, Place, Time and Situation  Reliability:  good  Insight:  Good  Judgement:  Good  Impulse Control:  Good  Physical/Medical Issues:  Yes See medical histry    PHQ-9 Depression Screening  Little interest or pleasure in doing things? (P) 1   Feeling down, depressed, or hopeless? (P) 1   Trouble falling or staying asleep, or sleeping too much? (P) 1   Feeling tired or having little energy? (P) 1   Poor appetite or overeating? (P) 1   Feeling bad about yourself - or that you are a failure or have let yourself or your family down? (P) 2   Trouble concentrating on things, such as reading the newspaper or watching television? (P) 3   Moving or speaking so slowly that other people could have noticed? Or the opposite - being so fidgety or restless that you have been moving around a lot more than usual? (P) 1   Thoughts that you would be better off dead, or of hurting yourself in some way? (P) 0   PHQ-9 Total Score (P) 11   If you checked off any problems, how difficult have these problems made it for you to do your work, take care of things at home, or get along with other people? (P) Somewhat difficult        PHQ-Score Total:  PHQ-9 Total Score: (P) 11    RADHA-7  Feeling nervous, anxious or on edge: (P) Several days  Not being able to stop or control worrying: (P) Several days  Worrying too much about different things: (P) Several days  Trouble Relaxing: (P) More than half the days  Being so restless that it is hard to sit still: (P) More than half the days  Feeling afraid as if something awful might happen: (P) More than half the days  Becoming easily annoyed or irritable: (P) More than half the days  RADHA 7 Total Score: (P) 11  If you checked any problems, how difficult have these problems made it for you to do your work, take care  of things at home, or get along with other people: (P) Somewhat difficult        PROMIS scale screening tool that patient filled out virtually reviewed by this APRN at today's encounter.       Assessment/Plan   Diagnoses and all orders for this visit:    1. Unspecified mood (affective) disorder (HCC) (Primary)  -     Discontinue: buPROPion XL (WELLBUTRIN XL) 150 MG 24 hr tablet; Take 1 tablet by mouth Daily.  Dispense: 30 tablet; Refill: 0  -     buPROPion XL (WELLBUTRIN XL) 300 MG 24 hr tablet; Take 1 tablet by mouth Daily.  Dispense: 30 tablet; Refill: 0    2. Anxiety disorder, unspecified type  -     Discontinue: buPROPion XL (WELLBUTRIN XL) 150 MG 24 hr tablet; Take 1 tablet by mouth Daily.  Dispense: 30 tablet; Refill: 0  -     buPROPion XL (WELLBUTRIN XL) 300 MG 24 hr tablet; Take 1 tablet by mouth Daily.  Dispense: 30 tablet; Refill: 0    3. Mood swings    4. Irritability    5. Attention deficit  -     Discontinue: buPROPion XL (WELLBUTRIN XL) 150 MG 24 hr tablet; Take 1 tablet by mouth Daily.  Dispense: 30 tablet; Refill: 0  -     buPROPion XL (WELLBUTRIN XL) 300 MG 24 hr tablet; Take 1 tablet by mouth Daily.  Dispense: 30 tablet; Refill: 0    6. Sleeping difficulties        In/Start Time: 8:12 AM.  Out/Stop Time: 8:28 AM.  16 minutes of face to face direct patient care with patient was spent for supportive psychotherapy including promoting the therapeutic alliance, strengthening self awareness and insights, strengthening coping skills, counseling the patient regarding diagnoses, utilizing cognitive behavioral therapy to assist the patient in recognizing more appropriate coping mechanisms which are proven effective in reducing the severity of frequency of symptoms and and in coordination of care.  This APRN assisted the patient in processing the patient's diagnoses including depression, anxiety, and attention deficit, and also acknowledged and normalized the patient's thoughts, feelings, and concerns  The  patient is also strongly urged to eat healthy well balanced foods in order to reduce further health risks, and exercise as tolerated and in guidance with the patient's PCP's recommendations. This APRN allowed the patient to freely discuss issues without interruption or judgment.  This APRN answered any questions the patient had regarding medication and treatment plan.      Visit Diagnoses:    ICD-10-CM ICD-9-CM   1. Unspecified mood (affective) disorder (HCC)  F39 296.90   2. Anxiety disorder, unspecified type  F41.9 300.00   3. Mood swings  R45.86 799.24   4. Irritability  R45.4 799.22   5. Attention deficit  R41.840 799.51   6. Sleeping difficulties  G47.9 780.50       GOALS:  Short Term Goals: Patient will be compliant with medication, and patient will have no significant medication related side effects.  Patient will be engaged in psychotherapy as indicated.  Patient will report subjective improvement of symptoms.  Long term goals: To stabilize mood and treat/improve subjective symptoms, the patient will stay out of the hospital, the patient will be at an optimal level of functioning, and the patient will take all medications as prescribed.  The patient verbalized understanding and agreement with goals that were mutually set.      SUICIDE RISK ASSESSMENT: Unalterable demographics and a history of mental health intervention indicate this patient is in a high risk category compared to the general population. At present, the patient denies active SI/HI, intentions, or plans at this time and agrees to seek immediate care should such thoughts develop. The patient verbalizes understanding of how to access emergency care if needed and agrees to do so. Consideration of suicide risk and protective factors such as history, current presentation, individual strengths and weaknesses, psychosocial and environmental stressors and variables, psychiatric illness and symptoms, medical conditions and pain, took place in this  interview. Based on those considerations, the patient is determined: within individual baseline and presenting no imminent risk for suicide or homicide. Other recommendations: The patient does not meet the criteria for inpatient admission and is not a safety risk to self or others at today's visit. Inpatient treatment offers no significant advantages over outpatient treatment for this patient at today's visit.      SAFETY PLAN:  Patient was given ample time for questions and fully participated in treatment planning.  Patient was encouraged to call the clinic with any questions or concerns.  Patient was informed of access to emergency care. If patient were to develop any significant symptomatology, suicidal ideation, homicidal ideation, any concerns, or feel unsafe at any time they are to call the clinic and if unable to get immediate assistance should immediately call 911 or go to the nearest emergency room.  The patient is advised to remove or secure (lock away) all lethal weapons (including guns) and sharps (including razors, scissors, knives, etc.).  All medications (including any prescribed and any over the counter medications) should be stored in a safe and secured location that is not obtainable by children/adolescents.  Patient was given an opportunity and encouraged to ask questions about their medication, illness, and treatment. Patient contracted verbally for the following: If you are experiencing an emotional crisis or have thoughts of harming yourself or others, please go to your nearest local emergency room or call 911. Will continue to re-assess medication response and side effects frequently to establish efficacy and ensure safety. Risks, any black box warnings, side effects, off label usage, and benefits of medication and treatment discussed with patient, along with potential adverse side effects of current and/or newly prescribed medication, alternative treatment options, and OTC medications.   Patient verbalized understanding of potential risks, any off label use of medication, any black box warnings, and any side effects in their own words. The patient verbalized understanding and agreed to comply with the safety plan discussed in their own words.  Patient given the number to the office. Number also available to the 24- hour suicide hotline.       TREATMENT PLAN/GOALS: Continue medications and treatment plan as indicated. Treatment and medication options discussed during today's visit. Patient acknowledged and verbally consented to continue with current treatment plan and was educated on the importance of compliance with treatment and follow-up appointments.        -Increase Wellbutrin XL to 300 mg daily for depression, anxiety, and attention deficit         MEDICATION ISSUES: Discussed medication options and treatment plan of prescribed medication, any off label use of medication, as well as the risks, benefits, any black box warnings including increased suicidality, and side effects including but not limited to potential falls, dizziness, possible impaired driving, GI side effects (change in appetite, abdominal discomfort, nausea, vomiting, diarrhea, and/or constipation), dry mouth, somnolence, sedation, insomnia, activation, agitation, irritation, tremors, abnormal muscle movements or disorders, headache, sweating, possible bruising or rare bleeding, electrolyte and/or fluid abnormalities, change in blood pressure/heart rate/and or heart rhythm, sexual dysfunction, and metabolic adversities among others. Patient and/or guardian agreeable to call the office with any worsening of symptoms or onset of side effects, or if any concerns or questions arise.  The contact information for the office is made available to the patient and/or guardian.  Patient and/or guardian agreeable to call 911 or go to the nearest ER should they begin having any SI/HI, or if any urgent concerns arise. No medication side  effects or related complaints today.                  [facility] No Show Policy:  We understand unexpected circumstances arise; however, anytime you miss your appointment we are unable to provide you appropriate care.  In addition, each appointment missed could have been used to provide care for others.  We ask that you call at least 24 hours in advance to cancel or reschedule an appointment.  We would like to take this opportunity to remind you of our policy stating patients who miss THREE or more appointments without cancelling or rescheduling 24 hours in advance of the appointment may be subject to cancellation of any further visits with our clinic and recommendation to seek in-person services/visits.    Please call [phone] to reschedule your appointment. If there are reasons that make it difficult for you to keep the appointments, please call and let us know how we can help.  Please understand that medication prescribing will not continue without seeing your provider.      [facility]'s No Show Policy reviewed with patient at today's visit. Patient verbalized understanding of this policy. Discussed with patient that in the event that there are three or more no show visits, it will be recommended that they pursue in-person services/visits as noncompliance with telehealth visits indicates that patient is not an appropriate candidate for telemedicine and would likely be more appropriate for in-person services/visits. Patient verbalizes understanding and is agreeable to this.           MEDS ORDERED DURING VISIT:  New Medications Ordered This Visit   Medications   • buPROPion XL (WELLBUTRIN XL) 300 MG 24 hr tablet     Sig: Take 1 tablet by mouth Daily.     Dispense:  30 tablet     Refill:  0       Return in about 4 weeks (around 2/16/2022), or if symptoms worsen or fail to improve, for Recheck.        Patient will follow-up in 4 weeks, highly encouraged the patient  if he had any questions or concerns to contact the behavioral health [facility] clinic for sooner appointment patient verbalized understanding.      Functional Status: Moderate impairment     Prognosis: Guarded with Ongoing Treatment            This document has been electronically signed by ELMO Last  January 19, 2022 08:36 EST      Some of the data in this electronic note has been brought forward from a previous encounter, any necessary changes have been made, it has been reviewed by this APRN, and it is accurate.       Part of this note may be an electronic transcription/translation of spoken language to printed text using the Dragon Dictation System.

## 2022-02-02 ENCOUNTER — OFFICE VISIT (OUTPATIENT)
Dept: FAMILY MEDICINE CLINIC | Facility: CLINIC | Age: 42
End: 2022-02-02

## 2022-02-02 VITALS
HEIGHT: 67 IN | OXYGEN SATURATION: 97 % | TEMPERATURE: 98.6 F | HEART RATE: 77 BPM | WEIGHT: 210 LBS | DIASTOLIC BLOOD PRESSURE: 70 MMHG | SYSTOLIC BLOOD PRESSURE: 120 MMHG | BODY MASS INDEX: 32.96 KG/M2

## 2022-02-02 DIAGNOSIS — Z12.83 SKIN CANCER SCREENING: ICD-10-CM

## 2022-02-02 DIAGNOSIS — Z00.00 WELL ADULT EXAM: Primary | ICD-10-CM

## 2022-02-02 DIAGNOSIS — H61.22 IMPACTED CERUMEN OF LEFT EAR: ICD-10-CM

## 2022-02-02 PROCEDURE — 99396 PREV VISIT EST AGE 40-64: CPT | Performed by: FAMILY MEDICINE

## 2022-02-02 NOTE — PROGRESS NOTES
"Subjective   Robert Feliciano is a 41 y.o. male.     Patient presents for his annual.    Last time he was seen, was complaining of shoulder pain which has since resolved.  He did not need to go to PT for therapy.  Takes wellbutrin, reports good efficacy and no complaints of side effects.  Used to take Lamictal (200mg qnight) in combination with Wellbutrin (100 mg) for Bipolar 2, but stopped taking Lamictal in November 2021, but has continued Wellbutrin.  He does complain of \"lots of wax\" in his L ear, and would like to have his ears cleaned today.  Follows with GI - has PMHx of condyloma and posterior anal fissure.  Notes in chart.  Would like to receive referral to a practitioner associated with [employer] to continue following with GI. Will follow up as appropriate. Has also followed with Nephrology in the past, has no concerns and has not followed with anyone in the past year. Does not follow with Derm but would like referral for yearly and estb care.  Reports he sleeps 7 hours per night on average.  Diet is overall healthy.  Is not currently exercising due to COVID related concerns.  Does not follow with optometry - no concerns.  Is UTD on dental exam.  Patient did receive flu vaccine this year.  He is also fully vaccinated against COVID and boosted in COVID19.  Would like to receive his next 5 month booster at today's visit.          The following portions of the patient's history were reviewed and updated as appropriate: allergies, current medications, past family history, past medical history, past social history, past surgical history and problem list.    Review of Systems   Constitutional: Negative for appetite change and fatigue.   HENT: Negative for hearing loss, rhinorrhea and sore throat.    Eyes: Negative for blurred vision and double vision.   Respiratory: Negative for cough and shortness of breath.    Cardiovascular: Negative for leg swelling.   Gastrointestinal: Negative for constipation, diarrhea, " nausea, vomiting and GERD.   Endocrine: Negative for polydipsia and polyuria.   Genitourinary: Negative for dysuria and frequency.   Musculoskeletal: Negative for arthralgias and myalgias.   Skin: Negative for skin lesions and wound.   Allergic/Immunologic: Positive for environmental allergies (Will take OTC antihistamine and flonase as needed).   Neurological: Negative for headache and memory problem.   Psychiatric/Behavioral: Negative for depressed mood. The patient is nervous/anxious (At baseline).        Objective   Physical Exam  Constitutional:       Appearance: Normal appearance.   HENT:      Head: Normocephalic and atraumatic.      Right Ear: Ear canal normal. A middle ear effusion is present.      Left Ear: Ear canal normal. There is impacted cerumen.      Nose: Nose normal. No congestion or rhinorrhea.      Mouth/Throat:      Mouth: Mucous membranes are moist.      Pharynx: Oropharynx is clear. No oropharyngeal exudate or posterior oropharyngeal erythema.   Eyes:      General: No scleral icterus.     Extraocular Movements: Extraocular movements intact.      Conjunctiva/sclera: Conjunctivae normal.      Pupils: Pupils are equal, round, and reactive to light.   Cardiovascular:      Rate and Rhythm: Normal rate and regular rhythm.      Pulses: Normal pulses.      Heart sounds: Normal heart sounds. No murmur heard.  No friction rub. No gallop.    Pulmonary:      Effort: Pulmonary effort is normal. No respiratory distress.      Breath sounds: Normal breath sounds. No wheezing or rales.   Abdominal:      General: Abdomen is flat. Bowel sounds are normal. There is no distension.      Palpations: There is no mass.      Tenderness: There is no abdominal tenderness.   Musculoskeletal:         General: No swelling or deformity. Normal range of motion.      Cervical back: Normal range of motion and neck supple. No rigidity.   Lymphadenopathy:      Cervical: No cervical adenopathy.   Skin:     General: Skin is warm.       Capillary Refill: Capillary refill takes less than 2 seconds.      Coloration: Skin is not jaundiced.      Findings: No bruising or erythema.   Neurological:      General: No focal deficit present.      Mental Status: He is alert and oriented to person, place, and time.      Cranial Nerves: No cranial nerve deficit.      Motor: No weakness.   Psychiatric:         Mood and Affect: Mood normal.         Behavior: Behavior normal.         Thought Content: Thought content normal.         Judgment: Judgment normal.       Ear Cerumen Removal    Date/Time: 2/2/2022 8:55 AM  Performed by: Mike Restrepo MD  Authorized by: Mike Restrepo MD   Consent: Verbal consent obtained.  Risks and benefits: risks, benefits and alternatives were discussed  Consent given by: patient  Patient understanding: patient states understanding of the procedure being performed  Patient consent: the patient's understanding of the procedure matches consent given  Patient tolerance: patient tolerated the procedure well with no immediate complications            Assessment/Plan   Problems Addressed this Visit        ENT    Impacted cerumen of left ear     The left TM was not visualized prior to the start of the procedure confirming the patient's concerns.  Warm water irrigation was instilled to the left ear via device with some return of material.  A wire loop speculum is used to completely remove the obstructing product.  The ears are re-examined.  TM's were completely visualize with normal anatomy.  The patient tolerated the procedure well with no concerns.  The patient reported good hearing and symptom relief.  Instructions on home care including intermittent use of over the counter products was reviewed (Debrox).  The patient is advised on Q-tip avoidance.  The patient is advised on hearing preservation, loud noise avoidance and hearing protection.               Relevant Orders    Cerumen Removal       Health Encounters    Well adult  exam - Primary     The patient is here for health maintenance visit.  Currently, the patient consumes a healthy diet and has an inadequate exercise regimen.  Screening lab work is ordered.  Immunizations were reviewed today.  Advice and education was given regarding nutrition, aerobic exercise, routine dental evaluations, routine eye exams, reproductive health, cardiovascular risk reduction, sunscreen use, self skin examination (annual dermatology evaluations) and seatbelt use (general overall safety).  Further recommendations will be given if needed after lab evaluation.  Annual wellness evaluation is recommended.           Relevant Orders    CBC & Differential    Comprehensive Metabolic Panel    Hemoglobin A1c    Lipid Panel       Skin    Skin cancer screening    Relevant Orders    Ambulatory Referral to Dermatology (Completed)      Diagnoses       Codes Comments    Well adult exam    -  Primary ICD-10-CM: Z00.00  ICD-9-CM: V70.0     Impacted cerumen of left ear     ICD-10-CM: H61.22  ICD-9-CM: 380.4     Skin cancer screening     ICD-10-CM: Z12.83  ICD-9-CM: V76.43           I attest that I have reviewed the student note and that the components of the history of the present illness, the physical exam, and the assessment and plan documented were performed by me or were performed in my presence by the student and verified by me.

## 2022-02-02 NOTE — ASSESSMENT & PLAN NOTE
The left TM was not visualized prior to the start of the procedure confirming the patient's concerns.  Warm water irrigation was instilled to the left ear via device with some return of material.  A wire loop speculum is used to completely remove the obstructing product.  The ears are re-examined.  TM's were completely visualize with normal anatomy.  The patient tolerated the procedure well with no concerns.  The patient reported good hearing and symptom relief.  Instructions on home care including intermittent use of over the counter products was reviewed (Debrox).  The patient is advised on Q-tip avoidance.  The patient is advised on hearing preservation, loud noise avoidance and hearing protection.

## 2022-02-15 DIAGNOSIS — F41.9 ANXIETY DISORDER, UNSPECIFIED TYPE: Chronic | ICD-10-CM

## 2022-02-15 DIAGNOSIS — R41.840 ATTENTION DEFICIT: ICD-10-CM

## 2022-02-15 DIAGNOSIS — F39 UNSPECIFIED MOOD (AFFECTIVE) DISORDER: Chronic | ICD-10-CM

## 2022-02-15 RX ORDER — BUPROPION HYDROCHLORIDE 300 MG/1
300 TABLET ORAL DAILY
Qty: 30 TABLET | Refills: 0 | OUTPATIENT
Start: 2022-02-15

## 2022-02-16 DIAGNOSIS — R41.840 ATTENTION DEFICIT: ICD-10-CM

## 2022-02-16 DIAGNOSIS — F39 UNSPECIFIED MOOD (AFFECTIVE) DISORDER: Chronic | ICD-10-CM

## 2022-02-16 DIAGNOSIS — F41.9 ANXIETY DISORDER, UNSPECIFIED TYPE: Chronic | ICD-10-CM

## 2022-02-16 RX ORDER — BUPROPION HYDROCHLORIDE 300 MG/1
300 TABLET ORAL DAILY
Qty: 30 TABLET | Refills: 0 | Status: SHIPPED | OUTPATIENT
Start: 2022-02-16 | End: 2022-03-10 | Stop reason: SDUPTHER

## 2022-02-16 NOTE — TELEPHONE ENCOUNTER
Provider cancelled appointment for 2/17/22 - Rescheduled for March 2022  Patient needs refills please     Thank You

## 2022-03-10 ENCOUNTER — TELEMEDICINE (OUTPATIENT)
Dept: PSYCHIATRY | Facility: CLINIC | Age: 42
End: 2022-03-10

## 2022-03-10 DIAGNOSIS — F39 UNSPECIFIED MOOD (AFFECTIVE) DISORDER: Primary | Chronic | ICD-10-CM

## 2022-03-10 DIAGNOSIS — R41.840 ATTENTION DEFICIT: ICD-10-CM

## 2022-03-10 DIAGNOSIS — F41.9 ANXIETY DISORDER, UNSPECIFIED TYPE: Chronic | ICD-10-CM

## 2022-03-10 PROCEDURE — 99214 OFFICE O/P EST MOD 30 MIN: CPT | Performed by: NURSE PRACTITIONER

## 2022-03-10 PROCEDURE — 90833 PSYTX W PT W E/M 30 MIN: CPT | Performed by: NURSE PRACTITIONER

## 2022-03-10 RX ORDER — BUPROPION HYDROCHLORIDE 300 MG/1
300 TABLET ORAL DAILY
Qty: 30 TABLET | Refills: 1 | Status: SHIPPED | OUTPATIENT
Start: 2022-03-10 | End: 2022-04-14 | Stop reason: SDUPTHER

## 2022-03-10 NOTE — PROGRESS NOTES
"This provider is located at the [facility] (through [facility]), [address], [facility], [address] using a secure [employer] Video Visit through [employer]. Patient is being seen remotely via telehealth at their home address in [place], and stated they are in a secure environment for this session. The patient's condition being diagnosed/treated is appropriate for telemedicine. The provider identified herself as well as her credentials. The patient, and/or patients guardian, consent to be seen remotely, and when consent is given they understand that the consent allows for patient identifiable information to be sent to a third party as needed. They may refuse to be seen remotely at any time. The electronic data is encrypted and password protected, and the patient and/or guardian has been advised of the potential risks to privacy not withstanding such measures.    You have chosen to receive care through a telehealth visit.  Do you consent to use a video/audio connection for your medical care today? Yes     Subjective   Robert Feliciano is a 41 y.o. male who is here today for medication management follow up.    Chief Complaint: Depression, anxiety, attention deficit    History of Present Illness:  The patient describes his mood as \"okay\" over the last few weeks.  Patient states that work has been a little stressful recently, but endorses that he feels that he has been tolerating this well.  The patient states that he did have unexpected death in the family last week and this is been difficult for him, but states that he feels that he has been coping with this appropriately also.  The patient states that he has been really focused on implementing a regular exercise routine which have been going well, but states that he had to pause this recently due to getting a cold.  The patient states that he is hopeful to get back to his regular exercise routine this weekend.  The patient " endorses that exercising regularly is very helpful for managing his mood.  The patient states that he has noticed improvements in his focus and concentration since beginning Wellbutrin XL.  The patient states that overall he is pleased with management of symptoms currently with the Wellbutrin XL.  The patient states that therapy has been going good recently and he is working on cognitive reframing at this time.  The patient reports his appetite as good.  The patient reports his sleep as good.  The patient states that he sleeps approximately 7 hours per night.  He states that he will awaken a couple times in the night to use the restroom, but states that he can fall back asleep easily.  The patient denies any nightmares or bad dreams.  The patient denies any new medical problems or changes in medications since last appointment with this facility.  The patient reports compliance with current medication regimen.  The patient denies any current side effects from his current medication regimen.  The patient denies any abnormal muscle movements or tics.  The patient rates his depression at a 3-5/10 on a 0-10 scale, with 10 being the worst.  The patient states that prior to the death of his family member last week and was able to continue his exercise regimen his depression was significantly improved, but states that since these things have happened his depressive symptoms have been increased.  The patient states that it is difficult for him to rate his anxiety on a 0-10 scale.  The patient states that his anxiety fluctuates, but endorses that overall anxiety has been improved.  Patient states that he is not having a generalized overall sense of anxiety as he was previously.  Patient endorses that he is anxious about certain situations approximately 3-4 times per week, but states that this has been more controlled and is not happening as often as he used to.  The patient rates hopelessness at a 0/10 on a 0-10 scale with 10  being the worst.  The patient denies suicidal ideations and homicidal ideations, and is convincing.  The patient denies any auditory hallucinations or visual hallucinations.  The patient does not endorse significant symptoms consistent with bipolar disorder or a psychotic illness.          Prior Psychiatric Medications:  Lamictal 200 mg daily - the patient states that he was started on this medication in 2019 and discontinued it approximately 2-3 weeks ago.  The patient states that he was started on this medication by his PMHNP after she diagnosed him with bipolar II disorder.  The patient states that he felt like his mood was better when he was taking this medication, but states that he did not like that the medication made him feel more fatigued.       The following portions of the patient's history were reviewed and updated as appropriate: allergies, current medications, past family history, past medical history, past social history, past surgical history and problem list.    Review of Systems   Constitutional: Negative for activity change, appetite change, fatigue and unexpected weight change.   Psychiatric/Behavioral: Positive for decreased concentration and dysphoric mood. Negative for agitation, behavioral problems, confusion, hallucinations, self-injury, sleep disturbance and suicidal ideas. The patient is nervous/anxious. The patient is not hyperactive.        Objective   Physical Exam  Constitutional:       Appearance: Normal appearance.   Neurological:      Mental Status: He is alert.   Psychiatric:         Attention and Perception: Attention and perception normal.         Mood and Affect: Affect normal. Mood is depressed.         Speech: Speech normal.         Behavior: Behavior normal. Behavior is cooperative.         Thought Content: Thought content normal. Thought content does not include homicidal or suicidal ideation. Thought content does not include homicidal or suicidal plan.         Cognition and  Memory: Cognition and memory normal.         Judgment: Judgment normal.       There were no vitals taken for this visit.    The patient was seen remotely today via a MyChart Video Visit through [facility].  Unable to obtain vital signs due to nature of remote visit.  Height stated at 67 inches.  Weight stated at 210 pounds.     No Known Allergies    No results found for this or any previous visit (from the past 2016 hour(s)).    Current Medications:   Current Outpatient Medications   Medication Sig Dispense Refill   • buPROPion XL (WELLBUTRIN XL) 300 MG 24 hr tablet Take 1 tablet by mouth Daily. 30 tablet 1     No current facility-administered medications for this visit.       Mental Status Exam:   Hygiene:   good  Cooperation:  Cooperative  Eye Contact:  Good  Psychomotor Behavior:  Appropriate  Affect:  Full range  Hopelessness: Denies  Speech:  Normal  Thought Process:  Goal directed  Thought Content:  Normal  Suicidal:  None  Homicidal:  None  Hallucinations:  None  Delusion:  None  Memory:  Intact  Orientation:  Person, Place, Time and Situation  Reliability:  good  Insight:  Good  Judgement:  Good  Impulse Control:  Good  Physical/Medical Issues:  Yes See medical histry    PHQ-9 Depression Screening  Little interest or pleasure in doing things? (P) 1   Feeling down, depressed, or hopeless? (P) 1   Trouble falling or staying asleep, or sleeping too much? (P) 1   Feeling tired or having little energy? (P) 1   Poor appetite or overeating? (P) 1   Feeling bad about yourself - or that you are a failure or have let yourself or your family down? (P) 2   Trouble concentrating on things, such as reading the newspaper or watching television? (P) 2   Moving or speaking so slowly that other people could have noticed? Or the opposite - being so fidgety or restless that you have been moving around a lot more than usual? (P) 1   Thoughts that you would be better off dead, or of hurting yourself in some way? (P) 0   PHQ-9 Total  Score (P) 10   If you checked off any problems, how difficult have these problems made it for you to do your work, take care of things at home, or get along with other people? (P) Somewhat difficult        PHQ-Score Total:  PHQ-9 Total Score: (P) 10    RADHA-7  Feeling nervous, anxious or on edge: (P) Several days  Not being able to stop or control worrying: (P) More than half the days  Worrying too much about different things: (P) More than half the days  Trouble Relaxing: (P) Several days  Being so restless that it is hard to sit still: (P) Several days  Feeling afraid as if something awful might happen: (P) Several days  Becoming easily annoyed or irritable: (P) More than half the days  RADHA 7 Total Score: (P) 10  If you checked any problems, how difficult have these problems made it for you to do your work, take care of things at home, or get along with other people: (P) Somewhat difficult        PROMIS scale screening tool that patient filled out virtually reviewed by this APRN at today's encounter.       Assessment/Plan   Diagnoses and all orders for this visit:    1. Unspecified mood (affective) disorder (HCC) (Primary)  -     buPROPion XL (WELLBUTRIN XL) 300 MG 24 hr tablet; Take 1 tablet by mouth Daily.  Dispense: 30 tablet; Refill: 1    2. Anxiety disorder, unspecified type  -     buPROPion XL (WELLBUTRIN XL) 300 MG 24 hr tablet; Take 1 tablet by mouth Daily.  Dispense: 30 tablet; Refill: 1    3. Attention deficit  -     buPROPion XL (WELLBUTRIN XL) 300 MG 24 hr tablet; Take 1 tablet by mouth Daily.  Dispense: 30 tablet; Refill: 1        In/Start Time: 8:10 AM EST.  Out/Stop Time: 8:26 AM EST.  16 minutes of face to face direct patient care with patient was spent for supportive psychotherapy including promoting the therapeutic alliance, strengthening self awareness and insights, strengthening coping skills, counseling the patient regarding diagnoses, utilizing cognitive behavioral therapy to assist the  patient in recognizing more appropriate coping mechanisms which are proven effective in reducing the severity of frequency of symptoms and and in coordination of care.  This APRN assisted the patient in processing the patient's diagnoses including depression, anxiety, and attention deficit, and also acknowledged and normalized the patient's thoughts, feelings, and concerns  The patient is also strongly urged to eat healthy well balanced foods in order to reduce further health risks, and exercise as tolerated and in guidance with the patient's PCP's recommendations. This APRN allowed the patient to freely discuss issues without interruption or judgment.  This APRN answered any questions the patient had regarding medication and treatment plan.        Visit Diagnoses:    ICD-10-CM ICD-9-CM   1. Unspecified mood (affective) disorder (HCC)  F39 296.90   2. Anxiety disorder, unspecified type  F41.9 300.00   3. Attention deficit  R41.840 799.51       GOALS:  Short Term Goals: Patient will be compliant with medication, and patient will have no significant medication related side effects.  Patient will be engaged in psychotherapy as indicated.  Patient will report subjective improvement of symptoms.  Long term goals: To stabilize mood and treat/improve subjective symptoms, the patient will stay out of the hospital, the patient will be at an optimal level of functioning, and the patient will take all medications as prescribed.  The patient verbalized understanding and agreement with goals that were mutually set.      SUICIDE RISK ASSESSMENT: Unalterable demographics and a history of mental health intervention indicate this patient is in a high risk category compared to the general population. At present, the patient denies active SI/HI, intentions, or plans at this time and agrees to seek immediate care should such thoughts develop. The patient verbalizes understanding of how to access emergency care if needed and agrees to  do so. Consideration of suicide risk and protective factors such as history, current presentation, individual strengths and weaknesses, psychosocial and environmental stressors and variables, psychiatric illness and symptoms, medical conditions and pain, took place in this interview. Based on those considerations, the patient is determined: within individual baseline and presenting no imminent risk for suicide or homicide. Other recommendations: The patient does not meet the criteria for inpatient admission and is not a safety risk to self or others at today's visit. Inpatient treatment offers no significant advantages over outpatient treatment for this patient at today's visit.      SAFETY PLAN:  Patient was given ample time for questions and fully participated in treatment planning.  Patient was encouraged to call the clinic with any questions or concerns.  Patient was informed of access to emergency care. If patient were to develop any significant symptomatology, suicidal ideation, homicidal ideation, any concerns, or feel unsafe at any time they are to call the clinic and if unable to get immediate assistance should immediately call 911 or go to the nearest emergency room.  The patient is advised to remove or secure (lock away) all lethal weapons (including guns) and sharps (including razors, scissors, knives, etc.).  All medications (including any prescribed and any over the counter medications) should be stored in a safe and secured location that is not obtainable by children/adolescents.  Patient was given an opportunity and encouraged to ask questions about their medication, illness, and treatment. Patient contracted verbally for the following: If you are experiencing an emotional crisis or have thoughts of harming yourself or others, please go to your nearest local emergency room or call 911. Will continue to re-assess medication response and side effects frequently to establish efficacy and ensure safety.  Risks, any black box warnings, side effects, off label usage, and benefits of medication and treatment discussed with patient, along with potential adverse side effects of current and/or newly prescribed medication, alternative treatment options, and OTC medications.  Patient verbalized understanding of potential risks, any off label use of medication, any black box warnings, and any side effects in their own words. The patient verbalized understanding and agreed to comply with the safety plan discussed in their own words.  Patient given the number to the office. Number also available to the 24- hour suicide hotline.       TREATMENT PLAN/GOALS: Continue medications and treatment plan as indicated. Treatment and medication options discussed during today's visit. Patient acknowledged and verbally consented to continue with current treatment plan and was educated on the importance of compliance with treatment and follow-up appointments.        -Continue Wellbutrin  mg daily for depression, anxiety, and attention deficit         MEDICATION ISSUES: Discussed medication options and treatment plan of prescribed medication, any off label use of medication, as well as the risks, benefits, any black box warnings including increased suicidality, and side effects including but not limited to potential falls, dizziness, possible impaired driving, GI side effects (change in appetite, abdominal discomfort, nausea, vomiting, diarrhea, and/or constipation), dry mouth, somnolence, sedation, insomnia, activation, agitation, irritation, tremors, abnormal muscle movements or disorders, headache, sweating, possible bruising or rare bleeding, electrolyte and/or fluid abnormalities, change in blood pressure/heart rate/and or heart rhythm, sexual dysfunction, and metabolic adversities among others. Patient and/or guardian agreeable to call the office with any worsening of symptoms or onset of side effects, or if any concerns or  questions arise.  The contact information for the office is made available to the patient and/or guardian.  Patient and/or guardian agreeable to call 911 or go to the nearest ER should they begin having any SI/HI, or if any urgent concerns arise. No medication side effects or related complaints today.                  [facility] No Show Policy:  We understand unexpected circumstances arise; however, anytime you miss your appointment we are unable to provide you appropriate care.  In addition, each appointment missed could have been used to provide care for others.  We ask that you call at least 24 hours in advance to cancel or reschedule an appointment.  We would like to take this opportunity to remind you of our policy stating patients who miss THREE or more appointments without cancelling or rescheduling 24 hours in advance of the appointment may be subject to cancellation of any further visits with our clinic and recommendation to seek in-person services/visits.    Please call [phone] to reschedule your appointment. If there are reasons that make it difficult for you to keep the appointments, please call and let us know how we can help.  Please understand that medication prescribing will not continue without seeing your provider.      [facility]'s No Show Policy reviewed with patient at today's visit. Patient verbalized understanding of this policy. Discussed with patient that in the event that there are three or more no show visits, it will be recommended that they pursue in-person services/visits as noncompliance with telehealth visits indicates that patient is not an appropriate candidate for telemedicine and would likely be more appropriate for in-person services/visits. Patient verbalizes understanding and is agreeable to this.           MEDS ORDERED DURING VISIT:  New Medications Ordered This Visit   Medications   • buPROPion XL (WELLBUTRIN XL) 300 MG 24  hr tablet     Sig: Take 1 tablet by mouth Daily.     Dispense:  30 tablet     Refill:  1       Return in about 5 weeks (around 4/14/2022), or if symptoms worsen or fail to improve, for Recheck.        Patient will follow-up in 5 weeks, highly encouraged the patient if he had any questions or concerns to contact the behavioral health virtual clinic for sooner appointment patient verbalized understanding.      Functional Status: Mild impairment     Prognosis: Fair with Ongoing Treatment             This document has been electronically signed by ELMO Last  March 10, 2022 08:31 EST      Some of the data in this electronic note has been brought forward from a previous encounter, any necessary changes have been made, it has been reviewed by this APRN, and it is accurate.       Part of this note may be an electronic transcription/translation of spoken language to printed text using the Dragon Dictation System.

## 2022-03-16 DIAGNOSIS — F41.9 ANXIETY DISORDER, UNSPECIFIED TYPE: Chronic | ICD-10-CM

## 2022-03-16 DIAGNOSIS — R41.840 ATTENTION DEFICIT: ICD-10-CM

## 2022-03-16 DIAGNOSIS — F39 UNSPECIFIED MOOD (AFFECTIVE) DISORDER: Chronic | ICD-10-CM

## 2022-03-16 RX ORDER — BUPROPION HYDROCHLORIDE 300 MG/1
300 TABLET ORAL DAILY
Qty: 30 TABLET | Refills: 1 | OUTPATIENT
Start: 2022-03-16

## 2022-04-14 ENCOUNTER — TELEPHONE (OUTPATIENT)
Dept: PSYCHIATRY | Facility: CLINIC | Age: 42
End: 2022-04-14

## 2022-04-14 ENCOUNTER — TELEMEDICINE (OUTPATIENT)
Dept: PSYCHIATRY | Facility: CLINIC | Age: 42
End: 2022-04-14

## 2022-04-14 DIAGNOSIS — F90.8 ADHD, ADULT RESIDUAL TYPE: Primary | Chronic | ICD-10-CM

## 2022-04-14 DIAGNOSIS — F41.1 GENERALIZED ANXIETY DISORDER: Chronic | ICD-10-CM

## 2022-04-14 DIAGNOSIS — F33.0 MILD EPISODE OF RECURRENT MAJOR DEPRESSIVE DISORDER: Chronic | ICD-10-CM

## 2022-04-14 PROCEDURE — 99214 OFFICE O/P EST MOD 30 MIN: CPT | Performed by: NURSE PRACTITIONER

## 2022-04-14 RX ORDER — ATOMOXETINE 10 MG/1
10 CAPSULE ORAL DAILY
Qty: 30 CAPSULE | Refills: 0 | Status: SHIPPED | OUTPATIENT
Start: 2022-04-14 | End: 2022-04-14 | Stop reason: SDUPTHER

## 2022-04-14 RX ORDER — BUPROPION HYDROCHLORIDE 300 MG/1
300 TABLET ORAL DAILY
Qty: 90 TABLET | Refills: 0 | Status: SHIPPED | OUTPATIENT
Start: 2022-04-14 | End: 2022-08-11 | Stop reason: SDUPTHER

## 2022-04-14 RX ORDER — ATOMOXETINE 10 MG/1
10 CAPSULE ORAL DAILY
Qty: 30 CAPSULE | Refills: 1 | Status: SHIPPED | OUTPATIENT
Start: 2022-04-14 | End: 2022-05-23

## 2022-04-14 NOTE — TELEPHONE ENCOUNTER
PATIENT CALLED IN REGARDS TO HIS [employer]RA.  WITH THE DEDUCTIBLE ON HIS INSURANCE   PATIENT CAN'T USE THE GOOD RX CARD DUE TO HIS INSURANCE.  PATIENT WOULD LIKE TO KNOW IF YOU CAN PRESCRIBE ANYTHING DIFFERENT THAT WOULD BE CHEAPER.

## 2022-04-14 NOTE — TELEPHONE ENCOUNTER
SPOKE TO PATIENT AND EXPLAINED HOW TO USE THE GOOD RX PLAN AND HE IS GOING TO USE THAT.  I NOTIFIED THE PHARMACY THAT HE WOULD BE USING A GOOD RX CARD TO  HIS PRESCRIPTION.

## 2022-05-23 ENCOUNTER — TELEMEDICINE (OUTPATIENT)
Dept: PSYCHIATRY | Facility: CLINIC | Age: 42
End: 2022-05-23

## 2022-05-23 DIAGNOSIS — F90.8 ADHD, ADULT RESIDUAL TYPE: Primary | Chronic | ICD-10-CM

## 2022-05-23 DIAGNOSIS — F33.0 MILD EPISODE OF RECURRENT MAJOR DEPRESSIVE DISORDER: Chronic | ICD-10-CM

## 2022-05-23 DIAGNOSIS — F51.04 PSYCHOPHYSIOLOGICAL INSOMNIA: ICD-10-CM

## 2022-05-23 DIAGNOSIS — F41.1 GENERALIZED ANXIETY DISORDER: Chronic | ICD-10-CM

## 2022-05-23 PROCEDURE — 99214 OFFICE O/P EST MOD 30 MIN: CPT | Performed by: NURSE PRACTITIONER

## 2022-05-23 RX ORDER — CLONIDINE HYDROCHLORIDE 0.1 MG/1
0.1 TABLET ORAL NIGHTLY PRN
Qty: 90 TABLET | Refills: 0 | Status: SHIPPED | OUTPATIENT
Start: 2022-05-23 | End: 2022-08-21

## 2022-05-23 NOTE — PROGRESS NOTES
"This provider is located at the [facility] (through [facility]), [address], [place], [address] using a secure [employer] Video Visit through [employer]. Patient is being seen remotely via telehealth at their home address in [place], and stated they are in a secure environment for this session. The patient's condition being diagnosed/treated is appropriate for telemedicine. The provider identified herself as well as her credentials. The patient, and/or patients guardian, consent to be seen remotely, and when consent is given they understand that the consent allows for patient identifiable information to be sent to a third party as needed. They may refuse to be seen remotely at any time. The electronic data is encrypted and password protected, and the patient and/or guardian has been advised of the potential risks to privacy not withstanding such measures.    You have chosen to receive care through a telehealth visit.  Do you consent to use a video/audio connection for your medical care today? Yes     Subjective   Robert Feliciano is a 41 y.o. male who is here today for medication management follow up.    Chief Complaint: Depression, anxiety, ADHD, insomnia    History of Present Illness:  The patient describes his mood as \"okay\" over the last few weeks.  The patient states that he was not able to tolerate the Strattera and stopped taking it approximately 5 weeks ago.  The patient states that it was causing sexual side effects.  The patient endorses that overall he thinks he has been doing okay over the past few weeks.  The patient states that he feels that his depression, anxiety, and ADHD have been rather well controlled recently.  The patient endorses that he thinks Wellbutrin XL in combination with his recent focus on diet and exercise have been helpful for managing his depression, anxiety, and ADHD.  The patient endorses that he thinks working 45 to 50 hours/week has " contributed to some exacerbations of his symptoms, but endorses that overall he thinks he has been able to appropriately manage these with a combination of the medication and nonpharmacologic behavioral interventions.  The patient reports his appetite as good.  The patient reports his sleep as fluctuating.  The patient states that he's been trying to get adjusted to wearing a bite guard at night, but endorses that overall he has been sleeping well recently.  He states that he sometimes has difficulty turning his mind off in order to fall asleep.  The patient states that once he is able to fall asleep he sleeps very well, but states that sometimes it is difficult for him to unwind and go to sleep.  The patient states that sometimes when he is unable to fall asleep as quickly as he would like it causes him to feel anxious which furthers his difficulty in falling asleep.  The patient denies any nightmares or bad dreams.  The patient denies any abnormal muscle movements or tics.  The patient rates his ADHD at a 5/10 on a 0-10 scale, with 10 being the worst.  The patient rates his depression at a 5/10 on a 0-10 scale, with 10 being the worst.  The patient rates his anxiety at a 5/10 on a 0-10 scale, with 10 being the worst.  The patient rates hopelessness at a 0/10 on a 0-10 scale with 10 being the worst.  The patient denies suicidal ideations and homicidal ideations, and is convincing.  The patient denies any auditory hallucinations or visual hallucinations.  The patient does not endorse significant symptoms consistent with bipolar disorder or a psychotic illness.                Prior Psychiatric Medications:  Lamictal 200 mg daily - the patient states that he was started on this medication in 2019 and discontinued it approximately 2-3 weeks ago.  The patient states that he was started on this medication by his PMHNP after she diagnosed him with bipolar II disorder.  The patient states that he felt like his mood was  better when he was taking this medication, but states that he did not like that the medication made him feel more fatigued.   Strattera 10 mg daily - caused sexual side effects       The following portions of the patient's history were reviewed and updated as appropriate: allergies, current medications, past family history, past medical history, past social history, past surgical history and problem list.    Review of Systems   Constitutional: Negative for activity change, appetite change, fatigue and unexpected weight change.   Psychiatric/Behavioral: Positive for decreased concentration and dysphoric mood. Negative for agitation, behavioral problems, confusion, hallucinations, self-injury, sleep disturbance and suicidal ideas. The patient is nervous/anxious. The patient is not hyperactive.        Objective   Physical Exam  Constitutional:       Appearance: Normal appearance.   Neurological:      Mental Status: He is alert.   Psychiatric:         Attention and Perception: Perception normal. He is inattentive.         Mood and Affect: Affect normal. Mood is anxious. Mood is not depressed.         Speech: Speech normal.         Behavior: Behavior normal. Behavior is cooperative.         Thought Content: Thought content normal. Thought content does not include homicidal or suicidal ideation. Thought content does not include homicidal or suicidal plan.         Cognition and Memory: Cognition and memory normal.         Judgment: Judgment normal.       There were no vitals taken for this visit.    The patient was seen remotely today via a MyChart Video Visit through [facility].  Unable to obtain vital signs due to nature of remote visit.  Height stated at 67 inches.  Weight stated at 210 pounds.     No Known Allergies    No results found for this or any previous visit (from the past 2016 hour(s)).    Current Medications:   Current Outpatient Medications   Medication Sig Dispense Refill   • buPROPion XL (WELLBUTRIN XL) 300 MG  24 hr tablet Take 1 tablet by mouth Daily. 90 tablet 0   • cloNIDine (CATAPRES) 0.1 MG tablet Take 1 tablet by mouth At Night As Needed (sleep/anxiety). 90 tablet 0     No current facility-administered medications for this visit.       Mental Status Exam:   Hygiene:   good  Cooperation:  Cooperative  Eye Contact:  Good  Psychomotor Behavior:  Appropriate  Affect:  Full range  Hopelessness: Denies  Speech:  Normal  Thought Process:  Goal directed  Thought Content:  Normal  Suicidal:  None  Homicidal:  None  Hallucinations:  None  Delusion:  None  Memory:  Intact  Orientation:  Person, Place, Time and Situation  Reliability:  good  Insight:  Good  Judgement:  Good  Impulse Control:  Good  Physical/Medical Issues:  Yes See medical histry        Assessment & Plan   Diagnoses and all orders for this visit:    1. ADHD, adult residual type (Primary)  -     cloNIDine (CATAPRES) 0.1 MG tablet; Take 1 tablet by mouth At Night As Needed (sleep/anxiety).  Dispense: 90 tablet; Refill: 0    2. Mild episode of recurrent major depressive disorder (HCC)    3. Generalized anxiety disorder  -     cloNIDine (CATAPRES) 0.1 MG tablet; Take 1 tablet by mouth At Night As Needed (sleep/anxiety).  Dispense: 90 tablet; Refill: 0    4. Psychophysiological insomnia  -     cloNIDine (CATAPRES) 0.1 MG tablet; Take 1 tablet by mouth At Night As Needed (sleep/anxiety).  Dispense: 90 tablet; Refill: 0            Visit Diagnoses:    ICD-10-CM ICD-9-CM   1. ADHD, adult residual type  F90.8 314.01   2. Mild episode of recurrent major depressive disorder (HCC)  F33.0 296.31   3. Generalized anxiety disorder  F41.1 300.02   4. Psychophysiological insomnia  F51.04 307.42       GOALS:  Short Term Goals: Patient will be compliant with medication, and patient will have no significant medication related side effects.  Patient will be engaged in psychotherapy as indicated.  Patient will report subjective improvement of symptoms.  Long term goals: To stabilize  mood and treat/improve subjective symptoms, the patient will stay out of the hospital, the patient will be at an optimal level of functioning, and the patient will take all medications as prescribed.  The patient verbalized understanding and agreement with goals that were mutually set.      SUICIDE RISK ASSESSMENT: Unalterable demographics and a history of mental health intervention indicate this patient is in a high risk category compared to the general population. At present, the patient denies active SI/HI, intentions, or plans at this time and agrees to seek immediate care should such thoughts develop. The patient verbalizes understanding of how to access emergency care if needed and agrees to do so. Consideration of suicide risk and protective factors such as history, current presentation, individual strengths and weaknesses, psychosocial and environmental stressors and variables, psychiatric illness and symptoms, medical conditions and pain, took place in this interview. Based on those considerations, the patient is determined: within individual baseline and presenting no imminent risk for suicide or homicide. Other recommendations: The patient does not meet the criteria for inpatient admission and is not a safety risk to self or others at today's visit. Inpatient treatment offers no significant advantages over outpatient treatment for this patient at today's visit.      SAFETY PLAN:  Patient was given ample time for questions and fully participated in treatment planning.  Patient was encouraged to call the clinic with any questions or concerns.  Patient was informed of access to emergency care. If patient were to develop any significant symptomatology, suicidal ideation, homicidal ideation, any concerns, or feel unsafe at any time they are to call the clinic and if unable to get immediate assistance should immediately call 911 or go to the nearest emergency room.  The patient is advised to remove or secure  (lock away) all lethal weapons (including guns) and sharps (including razors, scissors, knives, etc.).  All medications (including any prescribed and any over the counter medications) should be stored in a safe and secured location that is not obtainable by children/adolescents.  Patient was given an opportunity and encouraged to ask questions about their medication, illness, and treatment. Patient contracted verbally for the following: If you are experiencing an emotional crisis or have thoughts of harming yourself or others, please go to your nearest local emergency room or call 911. Will continue to re-assess medication response and side effects frequently to establish efficacy and ensure safety. Risks, any black box warnings, side effects, off label usage, and benefits of medication and treatment discussed with patient, along with potential adverse side effects of current and/or newly prescribed medication, alternative treatment options, and OTC medications.  Patient verbalized understanding of potential risks, any off label use of medication, any black box warnings, and any side effects in their own words. The patient verbalized understanding and agreed to comply with the safety plan discussed in their own words.  Patient given the number to the office. Number also available to the 24- hour suicide hotline.       TREATMENT PLAN/GOALS: Continue medications and treatment plan as indicated. Treatment and medication options discussed during today's visit. Patient acknowledged and verbally consented to continue with current treatment plan and was educated on the importance of compliance with treatment and follow-up appointments.        -Discontinue Strattera 10 mg daily due to side effects.  States that he has not taken this medication in approximately 5 weeks due to side effects.   -Begin Clonidine 0.1 mg nightly as needed for sleep and as adjunct for ADHD/anxiety   -Continue Wellbutrin  mg daily for  depression/anxiety/ ADHD        MEDICATION ISSUES: Discussed medication options and treatment plan of prescribed medication, any off label use of medication, as well as the risks, benefits, any black box warnings including increased suicidality, and side effects including but not limited to potential falls, dizziness, possible impaired driving, GI side effects (change in appetite, abdominal discomfort, nausea, vomiting, diarrhea, and/or constipation), dry mouth, somnolence, sedation, insomnia, activation, agitation, irritation, tremors, abnormal muscle movements or disorders, headache, sweating, possible bruising or rare bleeding, electrolyte and/or fluid abnormalities, change in blood pressure/heart rate/and or heart rhythm, sexual dysfunction, and metabolic adversities among others. Patient and/or guardian agreeable to call the office with any worsening of symptoms or onset of side effects, or if any concerns or questions arise.  The contact information for the office is made available to the patient and/or guardian.  Patient and/or guardian agreeable to call 911 or go to the nearest ER should they begin having any SI/HI, or if any urgent concerns arise. No medication side effects or related complaints today.                    [facility] No Show Policy:  We understand unexpected circumstances arise; however, anytime you miss your appointment we are unable to provide you appropriate care.  In addition, each appointment missed could have been used to provide care for others.  We ask that you call at least 24 hours in advance to cancel or reschedule an appointment.  We would like to take this opportunity to remind you of our policy stating patients who miss THREE or more appointments without cancelling or rescheduling 24 hours in advance of the appointment may be subject to cancellation of any further visits with our clinic and recommendation to seek in-person services/visits.    Please call  [phone] to reschedule your appointment. If there are reasons that make it difficult for you to keep the appointments, please call and let us know how we can help.  Please understand that medication prescribing will not continue without seeing your provider.      [facility]'s No Show Policy reviewed with patient at today's visit. Patient verbalized understanding of this policy. Discussed with patient that in the event that there are three or more no show visits, it will be recommended that they pursue in-person services/visits as noncompliance with telehealth visits indicates that patient is not an appropriate candidate for telemedicine and would likely be more appropriate for in-person services/visits. Patient verbalizes understanding and is agreeable to this.           MEDS ORDERED DURING VISIT:  New Medications Ordered This Visit   Medications   • cloNIDine (CATAPRES) 0.1 MG tablet     Sig: Take 1 tablet by mouth At Night As Needed (sleep/anxiety).     Dispense:  90 tablet     Refill:  0       Return in about 4 weeks (around 6/20/2022), or if symptoms worsen or fail to improve, for Recheck.        Patient will follow-up in 4 weeks, highly encouraged the patient if he had any questions or concerns to contact the behavioral health virtual clinic for sooner appointment patient verbalized understanding.      Functional Status: Mild impairment     Prognosis: Fair with Ongoing Treatment             This document has been electronically signed by ELMO Last  May 23, 2022 08:27 EDT      Some of the data in this electronic note has been brought forward from a previous encounter, any necessary changes have been made, it has been reviewed by this APRN, and it is accurate.       Part of this note may be an electronic transcription/translation of spoken language to printed text using the Dragon Dictation System.

## 2022-07-23 NOTE — PROGRESS NOTES
"This provider is located at the [facility] (through [facility]), [address], [place], [address] using a secure [employer] Video Visit through [employer]. Patient is being seen remotely via telehealth at their home address in [place], and stated they are in a secure environment for this session. The patient's condition being diagnosed/treated is appropriate for telemedicine. The provider identified herself as well as her credentials. The patient, and/or patients guardian, consent to be seen remotely, and when consent is given they understand that the consent allows for patient identifiable information to be sent to a third party as needed. They may refuse to be seen remotely at any time. The electronic data is encrypted and password protected, and the patient and/or guardian has been advised of the potential risks to privacy not withstanding such measures.    You have chosen to receive care through a telehealth visit.  Do you consent to use a video/audio connection for your medical care today? Yes     Subjective   Robert Feliciano is a 41 y.o. male who is here today for medication management follow up.    Chief Complaint: Depression, anxiety, ADHD    History of Present Illness:  The patient describes his mood as \"good\" over the last few weeks.  The patient states that overall he has been doing well over the past few weeks.  The patient states that his mood/depression has been improved recently.  The patient states that he has continued to have some anxiety, but endorses that this typically is related to work stresses and his difficulties with focus and concentration.  The patient states that his anxiety typically fluctuates with whatever is going on in his life at that time.  The patient states that overall he has noticed improvements in his focus and concentration with beginning the Wellbutrin and implementing nonpharmacologic behavioral interventions, but states that he has " continued to have a lot of difficulty with the symptoms.  The patient endorses that he has noticed that symptoms are more well controlled when he has good structure and routine, such as at work.  The patient endorses that he continues to struggle mostly with his difficulties with focus/concentration during his leisure time while at home.  The patient states for example that whenever he is reading for leisure he has difficulty focusing on the task at hand and forget what he has just read.  The patient reports his appetite as good.  The patient reports his sleep as good.  The patient endorses that he has been averaging approximately 6 or 7 hours of sleep per night and that sleep has been better recently.  The patient denies any nightmares or bad dreams.  The patient states that he has been sleeping better recently.  States that he has been exercising regularly, which he endorses that has improved his sleep.  The patient states that exercising has also seemed to improve his mood/depression.  The patient denies any new medical problems or changes in medications since last appointment with this facility.  The patient reports compliance with current medication regimen.  The patient denies any current side effects from his current medication regimen.  The patient denies any abnormal muscle movements or tics.  The patient rates his depression at a 4/10 on a 0-10 scale, with 10 being the worst.  The patient rates his anxiety at a 5-6/10 on a 0-10 scale, with 10 being the worst.  The patient rates hopelessness at a 0/10 on a 0-10 scale with 10 being the worst.  The patient denies suicidal ideations and homicidal ideations, and is convincing.  The patient denies any auditory hallucinations or visual hallucinations.  The patient does not endorse significant symptoms consistent with bipolar disorder or a psychotic illness.              Prior Psychiatric Medications:  Lamictal 200 mg daily - the patient states that he was started  on this medication in 2019 and discontinued it approximately 2-3 weeks ago.  The patient states that he was started on this medication by his PMHNP after she diagnosed him with bipolar II disorder.  The patient states that he felt like his mood was better when he was taking this medication, but states that he did not like that the medication made him feel more fatigued.       The following portions of the patient's history were reviewed and updated as appropriate: allergies, current medications, past family history, past medical history, past social history, past surgical history and problem list.    Review of Systems   Constitutional: Negative for activity change, appetite change, fatigue and unexpected weight change.   Psychiatric/Behavioral: Positive for decreased concentration and dysphoric mood. Negative for agitation, behavioral problems, confusion, hallucinations, self-injury, sleep disturbance and suicidal ideas. The patient is nervous/anxious. The patient is not hyperactive.        Objective   Physical Exam  Constitutional:       Appearance: Normal appearance.   Neurological:      Mental Status: He is alert.   Psychiatric:         Attention and Perception: Perception normal. He is inattentive.         Mood and Affect: Affect normal. Mood is anxious.         Speech: Speech normal.         Behavior: Behavior normal. Behavior is cooperative.         Thought Content: Thought content normal. Thought content does not include homicidal or suicidal ideation. Thought content does not include homicidal or suicidal plan.         Cognition and Memory: Cognition and memory normal.         Judgment: Judgment normal.       There were no vitals taken for this visit.    The patient was seen remotely today via a MyChart Video Visit through [facility].  Unable to obtain vital signs due to nature of remote visit.  Height stated at 67 inches.  Weight stated at 210 pounds.     No Known Allergies    No results found for this or any  previous visit (from the past 2016 hour(s)).    Current Medications:   Current Outpatient Medications   Medication Sig Dispense Refill   • atomoxetine (STRATTERA) 10 MG capsule Take 1 capsule by mouth Daily. 30 capsule 1   • buPROPion XL (WELLBUTRIN XL) 300 MG 24 hr tablet Take 1 tablet by mouth Daily. 90 tablet 0     No current facility-administered medications for this visit.       Mental Status Exam:   Hygiene:   good  Cooperation:  Cooperative  Eye Contact:  Good  Psychomotor Behavior:  Appropriate  Affect:  Full range  Hopelessness: Denies  Speech:  Normal  Thought Process:  Goal directed  Thought Content:  Normal  Suicidal:  None  Homicidal:  None  Hallucinations:  None  Delusion:  None  Memory:  Intact  Orientation:  Person, Place, Time and Situation  Reliability:  good  Insight:  Good  Judgement:  Good  Impulse Control:  Good  Physical/Medical Issues:  Yes See medical histry        Assessment/Plan   Diagnoses and all orders for this visit:    1. ADHD, adult residual type (Primary)  -     buPROPion XL (WELLBUTRIN XL) 300 MG 24 hr tablet; Take 1 tablet by mouth Daily.  Dispense: 90 tablet; Refill: 0  -     Discontinue: atomoxetine (STRATTERA) 10 MG capsule; Take 1 capsule by mouth Daily.  Dispense: 30 capsule; Refill: 0  -     atomoxetine (STRATTERA) 10 MG capsule; Take 1 capsule by mouth Daily.  Dispense: 30 capsule; Refill: 1    2. Mild episode of recurrent major depressive disorder (HCC)  -     buPROPion XL (WELLBUTRIN XL) 300 MG 24 hr tablet; Take 1 tablet by mouth Daily.  Dispense: 90 tablet; Refill: 0  -     Discontinue: atomoxetine (STRATTERA) 10 MG capsule; Take 1 capsule by mouth Daily.  Dispense: 30 capsule; Refill: 0  -     atomoxetine (STRATTERA) 10 MG capsule; Take 1 capsule by mouth Daily.  Dispense: 30 capsule; Refill: 1    3. Generalized anxiety disorder  -     buPROPion XL (WELLBUTRIN XL) 300 MG 24 hr tablet; Take 1 tablet by mouth Daily.  Dispense: 90 tablet; Refill: 0  -     Discontinue:  atomoxetine (STRATTERA) 10 MG capsule; Take 1 capsule by mouth Daily.  Dispense: 30 capsule; Refill: 0  -     atomoxetine (STRATTERA) 10 MG capsule; Take 1 capsule by mouth Daily.  Dispense: 30 capsule; Refill: 1            Visit Diagnoses:    ICD-10-CM ICD-9-CM   1. ADHD, adult residual type  F90.8 314.01   2. Mild episode of recurrent major depressive disorder (HCC)  F33.0 296.31   3. Generalized anxiety disorder  F41.1 300.02       GOALS:  Short Term Goals: Patient will be compliant with medication, and patient will have no significant medication related side effects.  Patient will be engaged in psychotherapy as indicated.  Patient will report subjective improvement of symptoms.  Long term goals: To stabilize mood and treat/improve subjective symptoms, the patient will stay out of the hospital, the patient will be at an optimal level of functioning, and the patient will take all medications as prescribed.  The patient verbalized understanding and agreement with goals that were mutually set.      SUICIDE RISK ASSESSMENT: Unalterable demographics and a history of mental health intervention indicate this patient is in a high risk category compared to the general population. At present, the patient denies active SI/HI, intentions, or plans at this time and agrees to seek immediate care should such thoughts develop. The patient verbalizes understanding of how to access emergency care if needed and agrees to do so. Consideration of suicide risk and protective factors such as history, current presentation, individual strengths and weaknesses, psychosocial and environmental stressors and variables, psychiatric illness and symptoms, medical conditions and pain, took place in this interview. Based on those considerations, the patient is determined: within individual baseline and presenting no imminent risk for suicide or homicide. Other recommendations: The patient does not meet the criteria for inpatient admission and is  not a safety risk to self or others at today's visit. Inpatient treatment offers no significant advantages over outpatient treatment for this patient at today's visit.      SAFETY PLAN:  Patient was given ample time for questions and fully participated in treatment planning.  Patient was encouraged to call the clinic with any questions or concerns.  Patient was informed of access to emergency care. If patient were to develop any significant symptomatology, suicidal ideation, homicidal ideation, any concerns, or feel unsafe at any time they are to call the clinic and if unable to get immediate assistance should immediately call 911 or go to the nearest emergency room.  The patient is advised to remove or secure (lock away) all lethal weapons (including guns) and sharps (including razors, scissors, knives, etc.).  All medications (including any prescribed and any over the counter medications) should be stored in a safe and secured location that is not obtainable by children/adolescents.  Patient was given an opportunity and encouraged to ask questions about their medication, illness, and treatment. Patient contracted verbally for the following: If you are experiencing an emotional crisis or have thoughts of harming yourself or others, please go to your nearest local emergency room or call 911. Will continue to re-assess medication response and side effects frequently to establish efficacy and ensure safety. Risks, any black box warnings, side effects, off label usage, and benefits of medication and treatment discussed with patient, along with potential adverse side effects of current and/or newly prescribed medication, alternative treatment options, and OTC medications.  Patient verbalized understanding of potential risks, any off label use of medication, any black box warnings, and any side effects in their own words. The patient verbalized understanding and agreed to comply with the safety plan discussed in their  own words.  Patient given the number to the office. Number also available to the 24- hour suicide hotline.       TREATMENT PLAN/GOALS: Continue medications and treatment plan as indicated. Treatment and medication options discussed during today's visit. Patient acknowledged and verbally consented to continue with current treatment plan and was educated on the importance of compliance with treatment and follow-up appointments.        -Begin Strattera 10 mg daily for depression, anxiety, and ADHD  -Continue Wellbutrin  mg daily for depression, anxiety, and ADHD  -Discussed with the patient that since he will not be following up for approximately 6 weeks per his request we may increase his Strattera to 25 mg daily as tolerated approximately 4 weeks after beginning medication.  Discussed with the patient that we are starting the medication today at a low dose to ensure tolerability but explained that he would likely need to have his dose increased to achieve efficacy/target symptoms.  Discussed with the patient that if he tolerates the 10 mg well without side effects and would like to increase the dose prior to next follow-up in 6 weeks to notify his APRN and we will plan to increase to 25 mg daily in approximately 4 weeks.  The patient verbalizes understanding and endorses that he is agreeable to this.        MEDICATION ISSUES: Discussed medication options and treatment plan of prescribed medication, any off label use of medication, as well as the risks, benefits, any black box warnings including increased suicidality, and side effects including but not limited to potential falls, dizziness, possible impaired driving, GI side effects (change in appetite, abdominal discomfort, nausea, vomiting, diarrhea, and/or constipation), dry mouth, somnolence, sedation, insomnia, activation, agitation, irritation, tremors, abnormal muscle movements or disorders, headache, sweating, possible bruising or rare bleeding,  electrolyte and/or fluid abnormalities, change in blood pressure/heart rate/and or heart rhythm, sexual dysfunction, and metabolic adversities among others. Patient and/or guardian agreeable to call the office with any worsening of symptoms or onset of side effects, or if any concerns or questions arise.  The contact information for the office is made available to the patient and/or guardian.  Patient and/or guardian agreeable to call 911 or go to the nearest ER should they begin having any SI/HI, or if any urgent concerns arise. No medication side effects or related complaints today.    This APRN has discussed with the patient/guardian about the possibility of serotonin syndrome when certain medications are taken together, as is the case with this patient.  This APRN has provided the patient/guardian with a list of symptoms of serotonin syndrome including symptoms of autonomic instability, altered sensorium, confusion, restlessness, agitation, myoclonus, hyperreflexia, hyperthermia, diaphoresis, tremor, chills, diarrhea and cramps, ataxia, headache, migraines, seizures, and insomnia; which could lead to permanent hyperthermic brain damage, cardiovascular collapse, coma, or even death.  The patient/guardian are instructed to stop medications immediately and either contact this APRN/this office during regular office hours, or go to the emergency department/call 911, if they begin to experience any of the symptoms discussed.  The benefits and risks of the current medication regimen are discussed with the patient/guardian, and they feel that the benefits out weigh the risks.  The patient/guardian verbalized understanding and agreement in their own words.                  [facility] No Show Policy:  We understand unexpected circumstances arise; however, anytime you miss your appointment we are unable to provide you appropriate care.  In addition, each appointment missed could have been used to  provide care for others.  We ask that you call at least 24 hours in advance to cancel or reschedule an appointment.  We would like to take this opportunity to remind you of our policy stating patients who miss THREE or more appointments without cancelling or rescheduling 24 hours in advance of the appointment may be subject to cancellation of any further visits with our clinic and recommendation to seek in-person services/visits.    Please call [phone] to reschedule your appointment. If there are reasons that make it difficult for you to keep the appointments, please call and let us know how we can help.  Please understand that medication prescribing will not continue without seeing your provider.      [facility]'s No Show Policy reviewed with patient at today's visit. Patient verbalized understanding of this policy. Discussed with patient that in the event that there are three or more no show visits, it will be recommended that they pursue in-person services/visits as noncompliance with telehealth visits indicates that patient is not an appropriate candidate for telemedicine and would likely be more appropriate for in-person services/visits. Patient verbalizes understanding and is agreeable to this.           MEDS ORDERED DURING VISIT:  New Medications Ordered This Visit   Medications   • buPROPion XL (WELLBUTRIN XL) 300 MG 24 hr tablet     Sig: Take 1 tablet by mouth Daily.     Dispense:  90 tablet     Refill:  0   • atomoxetine (STRATTERA) 10 MG capsule     Sig: Take 1 capsule by mouth Daily.     Dispense:  30 capsule     Refill:  1       Return in about 6 weeks (around 5/26/2022), or if symptoms worsen or fail to improve, for Recheck.        Patient will follow-up in 6 weeks, highly encouraged the patient if he had any questions or concerns to contact the behavioral health virtual clinic for sooner appointment patient verbalized understanding.      Functional Status: Moderate  impairment     Prognosis: Fair with Ongoing Treatment             This document has been electronically signed by ELMO Last  April 14, 2022 09:10 EDT      Some of the data in this electronic note has been brought forward from a previous encounter, any necessary changes have been made, it has been reviewed by this APRN, and it is accurate.       Part of this note may be an electronic transcription/translation of spoken language to printed text using the Dragon Dictation System.   children/spouse

## 2022-08-11 DIAGNOSIS — F90.8 ADHD, ADULT RESIDUAL TYPE: Chronic | ICD-10-CM

## 2022-08-11 DIAGNOSIS — F33.0 MILD EPISODE OF RECURRENT MAJOR DEPRESSIVE DISORDER: Chronic | ICD-10-CM

## 2022-08-11 DIAGNOSIS — F41.1 GENERALIZED ANXIETY DISORDER: Chronic | ICD-10-CM

## 2022-08-12 DIAGNOSIS — F90.8 ADHD, ADULT RESIDUAL TYPE: Chronic | ICD-10-CM

## 2022-08-12 DIAGNOSIS — F33.0 MILD EPISODE OF RECURRENT MAJOR DEPRESSIVE DISORDER: Chronic | ICD-10-CM

## 2022-08-12 DIAGNOSIS — F41.1 GENERALIZED ANXIETY DISORDER: Chronic | ICD-10-CM

## 2022-08-12 RX ORDER — BUPROPION HYDROCHLORIDE 300 MG/1
300 TABLET ORAL DAILY
Qty: 90 TABLET | Refills: 0 | Status: SHIPPED | OUTPATIENT
Start: 2022-08-12 | End: 2022-11-07

## 2022-08-12 RX ORDER — BUPROPION HYDROCHLORIDE 300 MG/1
300 TABLET ORAL DAILY
Qty: 90 TABLET | Refills: 0 | OUTPATIENT
Start: 2022-08-12

## 2022-08-19 DIAGNOSIS — F51.04 PSYCHOPHYSIOLOGICAL INSOMNIA: ICD-10-CM

## 2022-08-19 DIAGNOSIS — F41.1 GENERALIZED ANXIETY DISORDER: Chronic | ICD-10-CM

## 2022-08-19 DIAGNOSIS — F90.8 ADHD, ADULT RESIDUAL TYPE: Chronic | ICD-10-CM

## 2022-08-21 RX ORDER — CLONIDINE HYDROCHLORIDE 0.1 MG/1
TABLET ORAL
Qty: 90 TABLET | Refills: 0 | Status: SHIPPED | OUTPATIENT
Start: 2022-08-21 | End: 2022-12-14

## 2022-11-07 DIAGNOSIS — F41.1 GENERALIZED ANXIETY DISORDER: Chronic | ICD-10-CM

## 2022-11-07 DIAGNOSIS — F33.0 MILD EPISODE OF RECURRENT MAJOR DEPRESSIVE DISORDER: Chronic | ICD-10-CM

## 2022-11-07 DIAGNOSIS — F90.8 ADHD, ADULT RESIDUAL TYPE: Chronic | ICD-10-CM

## 2022-11-07 RX ORDER — BUPROPION HYDROCHLORIDE 300 MG/1
300 TABLET ORAL DAILY
Qty: 90 TABLET | Refills: 0 | Status: SHIPPED | OUTPATIENT
Start: 2022-11-07 | End: 2022-12-14

## 2022-11-24 DIAGNOSIS — F90.8 ADHD, ADULT RESIDUAL TYPE: Chronic | ICD-10-CM

## 2022-11-24 DIAGNOSIS — F33.0 MILD EPISODE OF RECURRENT MAJOR DEPRESSIVE DISORDER: Chronic | ICD-10-CM

## 2022-11-24 DIAGNOSIS — F41.1 GENERALIZED ANXIETY DISORDER: Chronic | ICD-10-CM

## 2022-11-25 DIAGNOSIS — F90.8 ADHD, ADULT RESIDUAL TYPE: Chronic | ICD-10-CM

## 2022-11-25 DIAGNOSIS — F33.0 MILD EPISODE OF RECURRENT MAJOR DEPRESSIVE DISORDER: Chronic | ICD-10-CM

## 2022-11-25 DIAGNOSIS — F41.1 GENERALIZED ANXIETY DISORDER: Chronic | ICD-10-CM

## 2022-11-26 DIAGNOSIS — F90.8 ADHD, ADULT RESIDUAL TYPE: Chronic | ICD-10-CM

## 2022-11-26 DIAGNOSIS — F41.1 GENERALIZED ANXIETY DISORDER: Chronic | ICD-10-CM

## 2022-11-26 DIAGNOSIS — F33.0 MILD EPISODE OF RECURRENT MAJOR DEPRESSIVE DISORDER: Chronic | ICD-10-CM

## 2022-11-27 DIAGNOSIS — F33.0 MILD EPISODE OF RECURRENT MAJOR DEPRESSIVE DISORDER: Chronic | ICD-10-CM

## 2022-11-27 DIAGNOSIS — F90.8 ADHD, ADULT RESIDUAL TYPE: Chronic | ICD-10-CM

## 2022-11-27 DIAGNOSIS — F41.1 GENERALIZED ANXIETY DISORDER: Chronic | ICD-10-CM

## 2022-11-28 RX ORDER — BUPROPION HYDROCHLORIDE 300 MG/1
300 TABLET ORAL DAILY
Qty: 90 TABLET | Refills: 0 | OUTPATIENT
Start: 2022-11-28

## 2022-11-28 NOTE — TELEPHONE ENCOUNTER
Called patient with info from [facility] Steen and scheduled follow up appointment for 2 weeks out as patient said he needs advance notice for appointment.

## 2022-12-14 ENCOUNTER — TELEMEDICINE (OUTPATIENT)
Dept: PSYCHIATRY | Facility: CLINIC | Age: 42
End: 2022-12-14

## 2022-12-14 DIAGNOSIS — F90.8 ADHD, ADULT RESIDUAL TYPE: Chronic | ICD-10-CM

## 2022-12-14 DIAGNOSIS — F41.1 GENERALIZED ANXIETY DISORDER: Chronic | ICD-10-CM

## 2022-12-14 DIAGNOSIS — F33.0 MILD EPISODE OF RECURRENT MAJOR DEPRESSIVE DISORDER: Chronic | ICD-10-CM

## 2022-12-14 PROCEDURE — 99214 OFFICE O/P EST MOD 30 MIN: CPT | Performed by: NURSE PRACTITIONER

## 2022-12-14 RX ORDER — BUPROPION HYDROCHLORIDE 100 MG/1
100 TABLET ORAL 2 TIMES DAILY
Qty: 60 TABLET | Refills: 0 | Status: SHIPPED | OUTPATIENT
Start: 2022-12-14 | End: 2023-01-16

## 2022-12-14 NOTE — PROGRESS NOTES
"This provider is located at the [facility] (through [facility]), [address], [place], [address] using a secure [employer] Video Visit through [employer]. Patient is being seen remotely via telehealth at their home address in [place], and stated they are in a secure environment for this session. The patient's condition being diagnosed/treated is appropriate for telemedicine. The provider identified herself as well as her credentials. The patient, and/or patients guardian, consent to be seen remotely, and when consent is given they understand that the consent allows for patient identifiable information to be sent to a third party as needed. They may refuse to be seen remotely at any time. The electronic data is encrypted and password protected, and the patient and/or guardian has been advised of the potential risks to privacy not withstanding such measures.    You have chosen to receive care through a telehealth visit.  Do you consent to use a video/audio connection for your medical care today? Yes     Patient confirmed consent for today's encounter on 12/14/22.          Subjective   Robert Feliciano is a 42 y.o. male who is here today for medication management follow up.    Chief Complaint: Depression, anxiety, ADHD    History of Present Illness:  The patient describes his mood as \"okay\" over the last few weeks.  The patient states that he has been dealing with some life stressors recently that have contributed to his depression and anxiety, but states that overall these have continued to be well-managed with the Wellbutrin XL.  He states that his biggest concern at this time is that the Wellbutrin XL seems to be causing a significant amount of fatigue.  He states that he tried decreasing the dose to 100 mg by using an old prescription of this medication, and states that his fatigue was significant improved with this dose.  He states that he doesn't want to decrease the dose " to 100 mg due to fear that it would not be enough to manage his depression/anxiety, and states that he thinks decreasing the dose to 200 mg daily would be best.  He states that he hasn't been able to exercise regularly as he would like due to the fatigue, which he endorses has been distressing for him.  He states that exercising also helps manage his depression/anxiety as well as his sleep, so states that he thinks decreasing the dose and being able to exercise more regularly would be best for him at this time.  He states that overall he has been sleeping okay recently, but states that he typically sleeps better whenever he is able to exercise consistently.  He denies any nightmares or bad dreams.  He states that he was not able to tolerate the Clonidine and that it caused hypotension, so he has not taken this medication in several months now.  He states that he is not interested in starting any new medications to address sleep at this time as he has been sleeping better on his own recently, and states that he thinks sleep will improve further once he is exercising regularly again.  The patient reports his appetite as good.  The patient denies any new medical problems or changes in medications since last appointment with this facility.  The patient denies any abnormal muscle movements or tics.  The patient rates his ADHD at a 5/10 on a 0-10 scale, with 10 being the worst.  The patient rates his depression at a 5/10 on a 0-10 scale, with 10 being the worst.  The patient rates his anxiety at a 5/10 on a 0-10 scale, with 10 being the worst.  The patient rates hopelessness at a 0/10 on a 0-10 scale with 10 being the worst.  The patient denies suicidal ideations and homicidal ideations, and is convincing.  The patient denies any auditory hallucinations or visual hallucinations.  The patient does not endorse significant symptoms consistent with bipolar disorder or a psychotic illness.              Prior Psychiatric  Medications:  Lamictal 200 mg daily - the patient states that he was started on this medication in 2019 and discontinued it approximately 2-3 weeks ago.  The patient states that he was started on this medication by his PMHNP after she diagnosed him with bipolar II disorder.  The patient states that he felt like his mood was better when he was taking this medication, but states that he did not like that the medication made him feel more fatigued.   Strattera 10 mg daily - caused sexual side effects   Clonidine 0.1 mg nightly as needed for insomnia/anxiety - caused hypotension   Wellbutrin  mg daily - effective for depression/anxiety/ADHD, but caused significant fatigue         The following portions of the patient's history were reviewed and updated as appropriate: allergies, current medications, past family history, past medical history, past social history, past surgical history and problem list.    Review of Systems   Constitutional: Positive for fatigue. Negative for activity change, appetite change and unexpected weight change.   Psychiatric/Behavioral: Positive for decreased concentration and dysphoric mood. Negative for agitation, behavioral problems, confusion, hallucinations, self-injury, sleep disturbance and suicidal ideas. The patient is nervous/anxious. The patient is not hyperactive.        Objective   Physical Exam  Constitutional:       Appearance: Normal appearance.   Neurological:      Mental Status: He is alert.   Psychiatric:         Attention and Perception: Perception normal. He is inattentive.         Mood and Affect: Affect normal. Mood is anxious and depressed.         Speech: Speech normal.         Behavior: Behavior normal. Behavior is cooperative.         Thought Content: Thought content normal. Thought content does not include homicidal or suicidal ideation. Thought content does not include homicidal or suicidal plan.         Cognition and Memory: Cognition and memory normal.          Judgment: Judgment normal.       There were no vitals taken for this visit.    The patient was seen remotely today via a MyChart Video Visit through [facility].  Unable to obtain vital signs due to nature of remote visit.  Height stated at 67 inches.  Weight stated at 210 pounds.     No Known Allergies    No results found for this or any previous visit (from the past 2016 hour(s)).    Current Medications:   Current Outpatient Medications   Medication Sig Dispense Refill   • buPROPion (WELLBUTRIN) 100 MG tablet Take 1 tablet by mouth 2 (Two) Times a Day. 60 tablet 0     No current facility-administered medications for this visit.       Mental Status Exam:   Hygiene:   good  Cooperation:  Cooperative  Eye Contact:  Good  Psychomotor Behavior:  Appropriate  Affect:  Full range  Hopelessness: Denies  Speech:  Normal  Thought Process:  Goal directed and Linear  Thought Content:  Normal  Suicidal:  None  Homicidal:  None  Hallucinations:  None  Delusion:  None  Memory:  Intact  Orientation:  Person, Place, Time and Situation  Reliability:  good  Insight:  Good  Judgement:  Good  Impulse Control:  Good  Physical/Medical Issues:  Yes See medical histry        Assessment & Plan   Diagnoses and all orders for this visit:    1. Mild episode of recurrent major depressive disorder (HCC)  -     buPROPion (WELLBUTRIN) 100 MG tablet; Take 1 tablet by mouth 2 (Two) Times a Day.  Dispense: 60 tablet; Refill: 0    2. Generalized anxiety disorder  -     buPROPion (WELLBUTRIN) 100 MG tablet; Take 1 tablet by mouth 2 (Two) Times a Day.  Dispense: 60 tablet; Refill: 0    3. ADHD, adult residual type  -     buPROPion (WELLBUTRIN) 100 MG tablet; Take 1 tablet by mouth 2 (Two) Times a Day.  Dispense: 60 tablet; Refill: 0            Visit Diagnoses:    ICD-10-CM ICD-9-CM   1. Mild episode of recurrent major depressive disorder (HCC)  F33.0 296.31   2. Generalized anxiety disorder  F41.1 300.02   3. ADHD, adult residual type  F90.8 314.01        GOALS:  Short Term Goals: Patient will be compliant with medication, and patient will have no significant medication related side effects.  Patient will be engaged in psychotherapy as indicated.  Patient will report subjective improvement of symptoms.  Long term goals: To stabilize mood and treat/improve subjective symptoms, the patient will stay out of the hospital, the patient will be at an optimal level of functioning, and the patient will take all medications as prescribed.  The patient verbalized understanding and agreement with goals that were mutually set.      SUICIDE RISK ASSESSMENT: Unalterable demographics and a history of mental health intervention indicate this patient is in a high risk category compared to the general population. At present, the patient denies active SI/HI, intentions, or plans at this time and agrees to seek immediate care should such thoughts develop. The patient verbalizes understanding of how to access emergency care if needed and agrees to do so. Consideration of suicide risk and protective factors such as history, current presentation, individual strengths and weaknesses, psychosocial and environmental stressors and variables, psychiatric illness and symptoms, medical conditions and pain, took place in this interview. Based on those considerations, the patient is determined: within individual baseline and presenting no imminent risk for suicide or homicide. Other recommendations: The patient does not meet the criteria for inpatient admission and is not a safety risk to self or others at today's visit. Inpatient treatment offers no significant advantages over outpatient treatment for this patient at today's visit.      SAFETY PLAN:  Patient was given ample time for questions and fully participated in treatment planning.  Patient was encouraged to call the clinic with any questions or concerns.  Patient was informed of access to emergency care. If patient were to develop any  significant symptomatology, suicidal ideation, homicidal ideation, any concerns, or feel unsafe at any time they are to call the clinic and if unable to get immediate assistance should immediately call 911 or go to the nearest emergency room.  The patient is advised to remove or secure (lock away) all lethal weapons (including guns) and sharps (including razors, scissors, knives, etc.).  All medications (including any prescribed and any over the counter medications) should be stored in a safe and secured location that is not obtainable by children/adolescents.  Patient was given an opportunity and encouraged to ask questions about their medication, illness, and treatment. Patient contracted verbally for the following: If you are experiencing an emotional crisis or have thoughts of harming yourself or others, please go to your nearest local emergency room or call 911. Will continue to re-assess medication response and side effects frequently to establish efficacy and ensure safety. Risks, any black box warnings, side effects, off label usage, and benefits of medication and treatment discussed with patient, along with potential adverse side effects of current and/or newly prescribed medication, alternative treatment options, and OTC medications.  Patient verbalized understanding of potential risks, any off label use of medication, any black box warnings, and any side effects in their own words. The patient verbalized understanding and agreed to comply with the safety plan discussed in their own words.  Patient given the number to the office. Number also available to the 24- hour suicide hotline.       TREATMENT PLAN/GOALS: Continue medications and treatment plan as indicated. Treatment and medication options discussed during today's visit. Patient acknowledged and verbally consented to continue with current treatment plan and was educated on the importance of compliance with treatment and follow-up  appointments.        -Discontinue Wellbutrin  mg daily for depression/anxiety/ADHD per patient request due to side effects (fatigue)  -Begin Wellbutrin  mg twice daily for depression/anxiety/ADHD        MEDICATION ISSUES: Discussed medication options and treatment plan of prescribed medication, any off label use of medication, as well as the risks, benefits, any black box warnings including increased suicidality, and side effects including but not limited to potential falls, dizziness, possible impaired driving, GI side effects (change in appetite, abdominal discomfort, nausea, vomiting, diarrhea, and/or constipation), dry mouth, somnolence, sedation, insomnia, activation, agitation, irritation, tremors, abnormal muscle movements or disorders, headache, sweating, possible bruising or rare bleeding, electrolyte and/or fluid abnormalities, change in blood pressure/heart rate/and or heart rhythm, sexual dysfunction, and metabolic adversities among others. Patient and/or guardian agreeable to call the office with any worsening of symptoms or onset of side effects, or if any concerns or questions arise.  The contact information for the office is made available to the patient and/or guardian.  Patient and/or guardian agreeable to call 911 or go to the nearest ER should they begin having any SI/HI, or if any urgent concerns arise. No medication side effects or related complaints today.                    [facility] No Show Policy:  We understand unexpected circumstances arise; however, anytime you miss your appointment we are unable to provide you appropriate care.  In addition, each appointment missed could have been used to provide care for others.  We ask that you call at least 24 hours in advance to cancel or reschedule an appointment.  We would like to take this opportunity to remind you of our policy stating patients who miss THREE or more appointments without cancelling or  rescheduling 24 hours in advance of the appointment may be subject to cancellation of any further visits with our clinic and recommendation to seek in-person services/visits.    Please call [phone] to reschedule your appointment. If there are reasons that make it difficult for you to keep the appointments, please call and let us know how we can help.  Please understand that medication prescribing will not continue without seeing your provider.      [facility]'s No Show Policy reviewed with patient at today's visit. Patient verbalized understanding of this policy. Discussed with patient that in the event that there are three or more no show visits, it will be recommended that they pursue in-person services/visits as noncompliance with telehealth visits indicates that patient is not an appropriate candidate for telemedicine and would likely be more appropriate for in-person services/visits. Patient verbalizes understanding and is agreeable to this.           MEDS ORDERED DURING VISIT:  New Medications Ordered This Visit   Medications   • buPROPion (WELLBUTRIN) 100 MG tablet     Sig: Take 1 tablet by mouth 2 (Two) Times a Day.     Dispense:  60 tablet     Refill:  0       Return in about 4 weeks (around 1/11/2023), or if symptoms worsen or fail to improve, for Recheck.        Patient will follow-up in 4 weeks, highly encouraged the patient if he had any questions or concerns to contact the behavioral health virtual clinic for sooner appointment patient verbalized understanding.      Functional Status: Moderate impairment     Prognosis: Fair with Ongoing Treatment             This document has been electronically signed by ELMO Last  December 14, 2022 09:09 EST      Some of the data in this electronic note has been brought forward from a previous encounter, any necessary changes have been made, it has been reviewed by this APRN, and it is accurate.       Part of this note may be  an electronic transcription/translation of spoken language to printed text using the Dragon Dictation System.

## 2023-01-14 DIAGNOSIS — F41.1 GENERALIZED ANXIETY DISORDER: Chronic | ICD-10-CM

## 2023-01-14 DIAGNOSIS — F90.8 ADHD, ADULT RESIDUAL TYPE: Chronic | ICD-10-CM

## 2023-01-14 DIAGNOSIS — F33.0 MILD EPISODE OF RECURRENT MAJOR DEPRESSIVE DISORDER: Chronic | ICD-10-CM

## 2023-01-16 RX ORDER — BUPROPION HYDROCHLORIDE 100 MG/1
TABLET ORAL
Qty: 60 TABLET | Refills: 0 | Status: SHIPPED | OUTPATIENT
Start: 2023-01-16 | End: 2023-02-20

## 2023-02-17 DIAGNOSIS — F41.1 GENERALIZED ANXIETY DISORDER: Chronic | ICD-10-CM

## 2023-02-17 DIAGNOSIS — F90.8 ADHD, ADULT RESIDUAL TYPE: Chronic | ICD-10-CM

## 2023-02-17 DIAGNOSIS — F33.0 MILD EPISODE OF RECURRENT MAJOR DEPRESSIVE DISORDER: Chronic | ICD-10-CM

## 2023-02-20 RX ORDER — BUPROPION HYDROCHLORIDE 100 MG/1
TABLET ORAL
Qty: 60 TABLET | Refills: 0 | Status: SHIPPED | OUTPATIENT
Start: 2023-02-20 | End: 2023-02-27

## 2023-02-27 ENCOUNTER — TELEMEDICINE (OUTPATIENT)
Dept: PSYCHIATRY | Facility: CLINIC | Age: 43
End: 2023-02-27
Payer: COMMERCIAL

## 2023-02-27 DIAGNOSIS — F33.0 MILD EPISODE OF RECURRENT MAJOR DEPRESSIVE DISORDER: Primary | Chronic | ICD-10-CM

## 2023-02-27 DIAGNOSIS — F90.8 ADHD, ADULT RESIDUAL TYPE: Chronic | ICD-10-CM

## 2023-02-27 DIAGNOSIS — F41.1 GENERALIZED ANXIETY DISORDER: Chronic | ICD-10-CM

## 2023-02-27 PROCEDURE — 99214 OFFICE O/P EST MOD 30 MIN: CPT | Performed by: NURSE PRACTITIONER

## 2023-02-27 RX ORDER — BUPROPION HYDROCHLORIDE 150 MG/1
150 TABLET, EXTENDED RELEASE ORAL 2 TIMES DAILY
Qty: 60 TABLET | Refills: 1 | Status: SHIPPED | OUTPATIENT
Start: 2023-02-27 | End: 2023-04-06 | Stop reason: SDUPTHER

## 2023-02-27 NOTE — PROGRESS NOTES
"This provider is located at the [facility] (through [facility]), [address], [facility], [address] using a secure [employer] Video Visit through [employer]. Patient is being seen remotely via telehealth at their home address in [place], and stated they are in a secure environment for this session. The patient's condition being diagnosed/treated is appropriate for telemedicine. The provider identified herself as well as her credentials. The patient, and/or patients guardian, consent to be seen remotely, and when consent is given they understand that the consent allows for patient identifiable information to be sent to a third party as needed. They may refuse to be seen remotely at any time. The electronic data is encrypted and password protected, and the patient and/or guardian has been advised of the potential risks to privacy not withstanding such measures.    You have chosen to receive care through a telehealth visit.  Do you consent to use a video/audio connection for your medical care today? Yes     Patient identifiers utilized: Name and date of birth.    Patient verbally confirmed consent for today's encounter 2/27/23.        Subjective   Robert Feliciano is a 42 y.o. male who is here today for medication management follow up.    Chief Complaint: Depression, anxiety, ADHD    History of Present Illness:  The patient describes his mood as \"okay\" over the last few weeks.  The patient states that overall things have been going okay over the past few weeks, but states that he has been dealing with some situational stressors that have contributed to exacerbations of his symptoms.  He states that he has been dealing with life stressors regarding his father and his health problems.  He explains that his father had a seizure event approximately 1.5 weeks ago and has been hospitalized ever since.  He states that they are planning to get him into an assisted living facility with " memory care, but states that they are having to work through getting him approved for Medicaid and disability through the [facility] which has delayed getting him placed.  He explains how recent life factors have contributed exacerbation of symptoms, but explains that he is focusing on utilization of nonpharmacologic behavioral interventions/modifications to help manage his symptoms during this difficult time.  He states for example that he is trying to exercise daily and also endorses that he is scheduled to begin therapy within the next couple of weeks.  He states that he tolerated switching from the Wellbutrin XL to Wellbutrin SR well and denies any adverse effects of this.  He states that the fatigue he was experiencing with the Wellbutrin XL resolved with switching to the Wellbutrin SR.  He states that he feels that the Wellbutrin SR has continued to be effective in managing his depression, anxiety, and ADHD, but states that with situational stressors he has been recently dealing with his symptoms have not been as well controlled as he would like.  The patient reports his appetite as good.  The patient reports his sleep as good.  The patient denies any nightmares or bad dreams.  The patient denies any new medical problems or changes in medications since last appointment with this facility.  The patient reports compliance with current medication regimen.  The patient denies any current side effects from his current medication regimen.  The patient denies any abnormal muscle movements or tics.  The patient rates his ADHD at a 9/10 on a 0-10 scale, with 10 being the worst.  The patient rates his depression at a 6-7/10 on a 0-10 scale, with 10 being the worst.  The patient rates his anxiety at a 10/10 on a 0-10 scale, with 10 being the worst.  The patient rates hopelessness at a 0/10 on a 0-10 scale with 10 being the worst.  The patient denies suicidal ideations and homicidal ideations, and is convincing.  The patient  denies any auditory hallucinations or visual hallucinations.  The patient does not endorse significant symptoms consistent with bipolar disorder or a psychotic illness.              Prior Psychiatric Medications:  Lamictal 200 mg daily - the patient states that he was started on this medication in 2019 and discontinued it approximately 2-3 weeks ago.  The patient states that he was started on this medication by his PMHNP after she diagnosed him with bipolar II disorder.  The patient states that he felt like his mood was better when he was taking this medication, but states that he did not like that the medication made him feel more fatigued.   Strattera 10 mg daily - caused sexual side effects   Clonidine 0.1 mg nightly as needed for insomnia/anxiety - caused hypotension   Wellbutrin  mg daily - effective for depression/anxiety/ADHD, but caused significant fatigue         The following portions of the patient's history were reviewed and updated as appropriate: allergies, current medications, past family history, past medical history, past social history, past surgical history and problem list.    Review of Systems   Constitutional: Negative for activity change, appetite change, fatigue and unexpected weight change.   Psychiatric/Behavioral: Positive for decreased concentration and dysphoric mood. Negative for agitation, behavioral problems, confusion, hallucinations, self-injury, sleep disturbance and suicidal ideas. The patient is nervous/anxious. The patient is not hyperactive.        Objective   Physical Exam  Constitutional:       Appearance: Normal appearance.   Neurological:      Mental Status: He is alert.   Psychiatric:         Attention and Perception: Perception normal. He is inattentive.         Mood and Affect: Affect normal. Mood is anxious and depressed.         Speech: Speech normal.         Behavior: Behavior normal. Behavior is cooperative.         Thought Content: Thought content normal.  Thought content does not include homicidal or suicidal ideation. Thought content does not include homicidal or suicidal plan.         Cognition and Memory: Cognition and memory normal.         Judgment: Judgment normal.       There were no vitals taken for this visit.    The patient was seen remotely today via a MyChart Video Visit through [facility].  Unable to obtain vital signs due to nature of remote visit.  Height stated at 67 inches.  Weight stated at 210 pounds.     No Known Allergies    No results found for this or any previous visit (from the past 2016 hour(s)).    Current Medications:   Current Outpatient Medications   Medication Sig Dispense Refill   • buPROPion SR (WELLBUTRIN SR) 150 MG 12 hr tablet Take 1 tablet by mouth 2 (Two) Times a Day. 60 tablet 1     No current facility-administered medications for this visit.       Mental Status Exam:   Hygiene:   good  Cooperation:  Cooperative  Eye Contact:  Good  Psychomotor Behavior:  Appropriate  Affect:  Full range  Hopelessness: Denies  Speech:  Normal  Thought Process:  Goal directed and Linear  Thought Content:  Normal  Suicidal:  None  Homicidal:  None  Hallucinations:  None  Delusion:  None  Memory:  Intact  Orientation:  Person, Place, Time and Situation  Reliability:  good  Insight:  Good  Judgement:  Good  Impulse Control:  Good  Physical/Medical Issues:  Yes See medical histry        PHQ-2 Depression Screening  Little interest or pleasure in doing things? 1-->several days   Feeling down, depressed, or hopeless? 1-->several days   PHQ-2 Total Score 2         Assessment & Plan   Diagnoses and all orders for this visit:    1. Mild episode of recurrent major depressive disorder (HCC) (Primary)  -     buPROPion SR (WELLBUTRIN SR) 150 MG 12 hr tablet; Take 1 tablet by mouth 2 (Two) Times a Day.  Dispense: 60 tablet; Refill: 1    2. Generalized anxiety disorder  -     buPROPion SR (WELLBUTRIN SR) 150 MG 12 hr tablet; Take 1 tablet by mouth 2 (Two) Times a Day.   Dispense: 60 tablet; Refill: 1    3. ADHD, adult residual type  -     buPROPion SR (WELLBUTRIN SR) 150 MG 12 hr tablet; Take 1 tablet by mouth 2 (Two) Times a Day.  Dispense: 60 tablet; Refill: 1            Visit Diagnoses:    ICD-10-CM ICD-9-CM   1. Mild episode of recurrent major depressive disorder (HCC)  F33.0 296.31   2. Generalized anxiety disorder  F41.1 300.02   3. ADHD, adult residual type  F90.8 314.01       GOALS:  Short Term Goals: Patient will be compliant with medication, and patient will have no significant medication related side effects.  Patient will be engaged in psychotherapy as indicated.  Patient will report subjective improvement of symptoms.  Long term goals: To stabilize mood and treat/improve subjective symptoms, the patient will stay out of the hospital, the patient will be at an optimal level of functioning, and the patient will take all medications as prescribed.  The patient verbalized understanding and agreement with goals that were mutually set.      SUICIDE RISK ASSESSMENT: Unalterable demographics and a history of mental health intervention indicate this patient is in a high risk category compared to the general population. At present, the patient denies active SI/HI, intentions, or plans at this time and agrees to seek immediate care should such thoughts develop. The patient verbalizes understanding of how to access emergency care if needed and agrees to do so. Consideration of suicide risk and protective factors such as history, current presentation, individual strengths and weaknesses, psychosocial and environmental stressors and variables, psychiatric illness and symptoms, medical conditions and pain, took place in this interview. Based on those considerations, the patient is determined: within individual baseline and presenting no imminent risk for suicide or homicide. Other recommendations: The patient does not meet the criteria for inpatient admission and is not a safety  risk to self or others at today's visit. Inpatient treatment offers no significant advantages over outpatient treatment for this patient at today's visit.      SAFETY PLAN:  Patient was given ample time for questions and fully participated in treatment planning.  Patient was encouraged to call the clinic with any questions or concerns.  Patient was informed of access to emergency care. If patient were to develop any significant symptomatology, suicidal ideation, homicidal ideation, any concerns, or feel unsafe at any time they are to call the clinic and if unable to get immediate assistance should immediately call 911 or go to the nearest emergency room.  The patient is advised to remove or secure (lock away) all lethal weapons (including guns) and sharps (including razors, scissors, knives, etc.).  All medications (including any prescribed and any over the counter medications) should be stored in a safe and secured location that is not obtainable by children/adolescents.  Patient was given an opportunity and encouraged to ask questions about their medication, illness, and treatment. Patient contracted verbally for the following: If you are experiencing an emotional crisis or have thoughts of harming yourself or others, please go to your nearest local emergency room or call 911. Will continue to re-assess medication response and side effects frequently to establish efficacy and ensure safety. Risks, any black box warnings, side effects, off label usage, and benefits of medication and treatment discussed with patient, along with potential adverse side effects of current and/or newly prescribed medication, alternative treatment options, and OTC medications.  Patient verbalized understanding of potential risks, any off label use of medication, any black box warnings, and any side effects in their own words. The patient verbalized understanding and agreed to comply with the safety plan discussed in their own words.   Patient given the number to the office. Number also available to the 24- hour suicide hotline.       TREATMENT PLAN/GOALS: Continue medications and treatment plan as indicated. Treatment and medication options discussed during today's visit. Patient acknowledged and verbally consented to continue with current treatment plan and was educated on the importance of compliance with treatment and follow-up appointments.        -Increase Wellbutrin SR to 150 mg twice daily for depression/anxiety/ADHD        MEDICATION ISSUES: Discussed medication options and treatment plan of prescribed medication, any off label use of medication, as well as the risks, benefits, any black box warnings including increased suicidality, and side effects including but not limited to potential falls, dizziness, possible impaired driving, GI side effects (change in appetite, abdominal discomfort, nausea, vomiting, diarrhea, and/or constipation), dry mouth, somnolence, sedation, insomnia, activation, agitation, irritation, tremors, abnormal muscle movements or disorders, headache, sweating, possible bruising or rare bleeding, electrolyte and/or fluid abnormalities, change in blood pressure/heart rate/and or heart rhythm, sexual dysfunction, and metabolic adversities among others. Patient and/or guardian agreeable to call the office with any worsening of symptoms or onset of side effects, or if any concerns or questions arise.  The contact information for the office is made available to the patient and/or guardian.  Patient and/or guardian agreeable to call 911 or go to the nearest ER should they begin having any SI/HI, or if any urgent concerns arise. No medication side effects or related complaints today.              VERBAL INFORMED CONSENT FOR MEDICATION:  The patient was educated that their proposed/prescribed psychotropic medication(s) has potential risks, side effects, adverse effects, and black box warnings; and these have been discussed  with the patient.  The patient has been informed that their treatment and medication dosage is to be individualized, and may even be above or below the recommended range/dosage due to patient individualization and response, but medication is prescribed using a shared decision making approach, and no medication or dosage will be prescribed without the patient's verbal consent.  The reason for the use of the medication including any off label use and alternative modes of treatment other than or in addition to medication has been considered and discussed, the probable consequences of not receiving the proposed treatment have been discussed, and any treatment side effects, black box warnings, and cautions associated with treatment have been discussed with the patient.  The patient is allowed ample time to openly discuss and ask questions regarding the proposed medication(s) and treatment plan and the patient verbalizes understanding the reasons for the use of the medication, its potential risks and benefits, other alternative treatment(s), and the probable consequences that may occur if the proposed medication is not given.  The patient has been given ample time to ask questions and study the information and find the information to be specific, accurate, and complete.  The patient gives verbal consent for the medication(s) proposed/prescribed, they verbalized understanding that they can refuse and withdraw consent at any time with the assistance of this APRN, and the patient has verbally confirmed that they are aware, and are willing, to take the prescribed medication and follow the treatment plan with the known possible risks, side effect, black box warnings, and any potential medication interactions, and the patient reports they will be worse off without this medication and treatment plan.  The patient is advised to contact this APRN/this office if any questions or concerns arise at any time (at [phone]), or call  911/go to the closest emergency department if needed or outside of office hours.          [facility] No Show Policy:  We understand unexpected circumstances arise; however, anytime you miss your appointment we are unable to provide you appropriate care.  In addition, each appointment missed could have been used to provide care for others.  We ask that you call at least 24 hours in advance to cancel or reschedule an appointment.  We would like to take this opportunity to remind you of our policy stating patients who miss THREE or more appointments without cancelling or rescheduling 24 hours in advance of the appointment may be subject to cancellation of any further visits with our clinic and recommendation to seek in-person services/visits.    Please call [phone] to reschedule your appointment. If there are reasons that make it difficult for you to keep the appointments, please call and let us know how we can help.  Please understand that medication prescribing will not continue without seeing your provider.      [facility]'s No Show Policy reviewed with patient at today's visit. Patient verbalized understanding of this policy. Discussed with patient that in the event that there are three or more no show visits, it will be recommended that they pursue in-person services/visits as noncompliance with telehealth visits indicates that patient is not an appropriate candidate for telemedicine and would likely be more appropriate for in-person services/visits. Patient verbalizes understanding and is agreeable to this.           MEDS ORDERED DURING VISIT:  New Medications Ordered This Visit   Medications   • buPROPion SR (WELLBUTRIN SR) 150 MG 12 hr tablet     Sig: Take 1 tablet by mouth 2 (Two) Times a Day.     Dispense:  60 tablet     Refill:  1       Return in about 6 weeks (around 4/10/2023), or if symptoms worsen or fail to improve, for Recheck.        Patient will  follow-up in 6 weeks, highly encouraged the patient if he had any questions or concerns to contact the behavioral health [facility] clinic for sooner appointment patient verbalized understanding.      Functional Status: Moderate impairment     Prognosis: Fair with Ongoing Treatment             This document has been electronically signed by ELMO Last  February 27, 2023 08:26 EST      Some of the data in this electronic note has been brought forward from a previous encounter, any necessary changes have been made, it has been reviewed by this APRN, and it is accurate.       Part of this note may be an electronic transcription/translation of spoken language to printed text using the Dragon Dictation System.

## 2023-04-06 ENCOUNTER — TELEMEDICINE (OUTPATIENT)
Dept: PSYCHIATRY | Facility: CLINIC | Age: 43
End: 2023-04-06
Payer: COMMERCIAL

## 2023-04-06 DIAGNOSIS — F33.1 MODERATE EPISODE OF RECURRENT MAJOR DEPRESSIVE DISORDER: Chronic | ICD-10-CM

## 2023-04-06 DIAGNOSIS — F90.8 ADHD, ADULT RESIDUAL TYPE: Chronic | ICD-10-CM

## 2023-04-06 DIAGNOSIS — F41.1 GENERALIZED ANXIETY DISORDER: Primary | Chronic | ICD-10-CM

## 2023-04-06 PROCEDURE — 99214 OFFICE O/P EST MOD 30 MIN: CPT | Performed by: NURSE PRACTITIONER

## 2023-04-06 RX ORDER — VILAZODONE HYDROCHLORIDE 10 MG/1
10 TABLET ORAL DAILY
Qty: 30 TABLET | Refills: 1 | Status: SHIPPED | OUTPATIENT
Start: 2023-04-06

## 2023-04-06 RX ORDER — BUPROPION HYDROCHLORIDE 150 MG/1
150 TABLET, EXTENDED RELEASE ORAL 2 TIMES DAILY
Qty: 60 TABLET | Refills: 1 | Status: SHIPPED | OUTPATIENT
Start: 2023-04-06

## 2023-04-06 NOTE — PROGRESS NOTES
"This provider is located at the [facility] (through [facility]), [address], [facility] [place], [address] using a secure [email or web address] Video Visit through [employer]. Patient is being seen remotely via telehealth at their home address in [place], and stated they are in a secure environment for this session. The patient's condition being diagnosed/treated is appropriate for telemedicine. The provider identified herself as well as her credentials. The patient, and/or patients guardian, consent to be seen remotely, and when consent is given they understand that the consent allows for patient identifiable information to be sent to a third party as needed. They may refuse to be seen remotely at any time. The electronic data is encrypted and password protected, and the patient and/or guardian has been advised of the potential risks to privacy not withstanding such measures.    You have chosen to receive care through a telehealth visit.  Do you consent to use a video/audio connection for your medical care today? Yes     Patient identifiers utilized: Name and date of birth.    Patient verbally confirmed consent for today's encounter 04/06/2023.        Subjective   Robert Feliciano is a 42 y.o. male who is here today for medication management follow up.    Chief Complaint: Depression, anxiety, ADHD    History of Present Illness:  The patient describes his mood as \"better\" over the last few weeks.  The patient endorses that overall he has been doing a little better recently.  He states that he has continued to have a lot of stress in dealing with his father and his recent medical problems.  He states that he has been looking for a new talk therapist since his previous therapist retired, and states that this has been stressful for him as well.  He states that his anxiety has been fairly high all the time recently.  He endorses that he thinks a lot of his anxiety is \"self-imposed\", but states that " he has also been dealing with a lot of life stressors recently and has found that anxiety has been significantly increased over the past several weeks.  He endorses that he often feels easily overwhelmed as his anxiety has been increased.  The patient reports his appetite as good.  The patient reports his sleep as good.  The patient denies any nightmares or bad dreams.  The patient denies any new medical problems or changes in medications since last appointment with this facility.  The patient reports compliance with current medication regimen.  The patient denies any current side effects from his current medication regimen.  The patient denies any abnormal muscle movements or tics.  The patient rates his ADHD at a 5/10 on a 0-10 scale, with 10 being the worst.  The patient rates his depression at a 5/10 on a 0-10 scale, with 10 being the worst.  The patient rates his anxiety at a 7/10 on a 0-10 scale, with 10 being the worst.  The patient rates hopelessness at a 0/10 on a 0-10 scale with 10 being the worst.  The patient denies suicidal ideations and homicidal ideations, and is convincing.  The patient denies any auditory hallucinations or visual hallucinations.  The patient does not endorse significant symptoms consistent with bipolar disorder or a psychotic illness.                Prior Psychiatric Medications:  Lamictal 200 mg daily - the patient states that he was started on this medication in 2019 and discontinued it approximately 2-3 weeks ago.  The patient states that he was started on this medication by his PMHNP after she diagnosed him with bipolar II disorder.  The patient states that he felt like his mood was better when he was taking this medication, but states that he did not like that the medication made him feel more fatigued.   Strattera 10 mg daily - caused sexual side effects   Clonidine 0.1 mg nightly as needed for insomnia/anxiety - caused hypotension   Wellbutrin  mg daily - effective for  depression/anxiety/ADHD, but caused significant fatigue   Zoloft - ineffective and caused side effects   Effexor -  ineffective and caused side effects         The following portions of the patient's history were reviewed and updated as appropriate: allergies, current medications, past family history, past medical history, past social history, past surgical history and problem list.    Review of Systems   Constitutional: Negative for activity change, appetite change, fatigue and unexpected weight change.   Psychiatric/Behavioral: Positive for decreased concentration and dysphoric mood. Negative for agitation, behavioral problems, confusion, hallucinations, self-injury, sleep disturbance and suicidal ideas. The patient is nervous/anxious. The patient is not hyperactive.        Objective   Physical Exam  Constitutional:       Appearance: Normal appearance.   Neurological:      Mental Status: He is alert.   Psychiatric:         Attention and Perception: Attention and perception normal.         Mood and Affect: Affect normal. Mood is anxious and depressed.         Speech: Speech normal.         Behavior: Behavior normal. Behavior is cooperative.         Thought Content: Thought content normal. Thought content does not include homicidal or suicidal ideation. Thought content does not include homicidal or suicidal plan.         Cognition and Memory: Cognition and memory normal.         Judgment: Judgment normal.       There were no vitals taken for this visit.    The patient was seen remotely today via a MyChart Video Visit through [facility].  Unable to obtain vital signs due to nature of remote visit.  Height stated at 67 inches.  Weight stated at 210 pounds.     No Known Allergies    No results found for this or any previous visit (from the past 2016 hour(s)).    Current Medications:   Current Outpatient Medications   Medication Sig Dispense Refill   • buPROPion SR (WELLBUTRIN SR) 150 MG 12 hr tablet Take 1 tablet by mouth 2  (Two) Times a Day. 60 tablet 1   • vilazodone (VIIBRYD) 10 MG tablet tablet Take 1 tablet by mouth Daily. 30 tablet 1     No current facility-administered medications for this visit.       Mental Status Exam:   Hygiene:   good  Cooperation:  Cooperative  Eye Contact:  Good  Psychomotor Behavior:  Appropriate  Affect:  Full range  Hopelessness: Denies  Speech:  Normal  Thought Process:  Goal directed and Linear  Thought Content:  Normal  Suicidal:  None  Homicidal:  None  Hallucinations:  None  Delusion:  None  Memory:  Intact  Orientation:  Person, Place, Time and Situation  Reliability:  good  Insight:  Good  Judgement:  Good  Impulse Control:  Good  Physical/Medical Issues:  Yes See medical histry          Assessment & Plan   Diagnoses and all orders for this visit:    1. Generalized anxiety disorder (Primary)  -     vilazodone (VIIBRYD) 10 MG tablet tablet; Take 1 tablet by mouth Daily.  Dispense: 30 tablet; Refill: 1    2. Moderate episode of recurrent major depressive disorder  -     buPROPion SR (WELLBUTRIN SR) 150 MG 12 hr tablet; Take 1 tablet by mouth 2 (Two) Times a Day.  Dispense: 60 tablet; Refill: 1  -     vilazodone (VIIBRYD) 10 MG tablet tablet; Take 1 tablet by mouth Daily.  Dispense: 30 tablet; Refill: 1    3. ADHD, adult residual type  -     buPROPion SR (WELLBUTRIN SR) 150 MG 12 hr tablet; Take 1 tablet by mouth 2 (Two) Times a Day.  Dispense: 60 tablet; Refill: 1            Visit Diagnoses:    ICD-10-CM ICD-9-CM   1. Generalized anxiety disorder  F41.1 300.02   2. Moderate episode of recurrent major depressive disorder  F33.1 296.32   3. ADHD, adult residual type  F90.8 314.01       GOALS:  Short Term Goals: Patient will be compliant with medication, and patient will have no significant medication related side effects.  Patient will be engaged in psychotherapy as indicated.  Patient will report subjective improvement of symptoms.  Long term goals: To stabilize mood and treat/improve subjective  symptoms, the patient will stay out of the hospital, the patient will be at an optimal level of functioning, and the patient will take all medications as prescribed.  The patient verbalized understanding and agreement with goals that were mutually set.      SUICIDE RISK ASSESSMENT: Unalterable demographics and a history of mental health intervention indicate this patient is in a high risk category compared to the general population. At present, the patient denies active SI/HI, intentions, or plans at this time and agrees to seek immediate care should such thoughts develop. The patient verbalizes understanding of how to access emergency care if needed and agrees to do so. Consideration of suicide risk and protective factors such as history, current presentation, individual strengths and weaknesses, psychosocial and environmental stressors and variables, psychiatric illness and symptoms, medical conditions and pain, took place in this interview. Based on those considerations, the patient is determined: within individual baseline and presenting no imminent risk for suicide or homicide. Other recommendations: The patient does not meet the criteria for inpatient admission and is not a safety risk to self or others at today's visit. Inpatient treatment offers no significant advantages over outpatient treatment for this patient at today's visit.      SAFETY PLAN:  Patient was given ample time for questions and fully participated in treatment planning.  Patient was encouraged to call the clinic with any questions or concerns.  Patient was informed of access to emergency care. If patient were to develop any significant symptomatology, suicidal ideation, homicidal ideation, any concerns, or feel unsafe at any time they are to call the clinic and if unable to get immediate assistance should immediately call 911 or go to the nearest emergency room.  The patient is advised to remove or secure (lock away) all lethal weapons  (including guns) and sharps (including razors, scissors, knives, etc.).  All medications (including any prescribed and any over the counter medications) should be stored in a safe and secured location that is not obtainable by children/adolescents.  Patient was given an opportunity and encouraged to ask questions about their medication, illness, and treatment. Patient contracted verbally for the following: If you are experiencing an emotional crisis or have thoughts of harming yourself or others, please go to your nearest local emergency room or call 911. Will continue to re-assess medication response and side effects frequently to establish efficacy and ensure safety. Risks, any black box warnings, side effects, off label usage, and benefits of medication and treatment discussed with patient, along with potential adverse side effects of current and/or newly prescribed medication, alternative treatment options, and OTC medications.  Patient verbalized understanding of potential risks, any off label use of medication, any black box warnings, and any side effects in their own words. The patient verbalized understanding and agreed to comply with the safety plan discussed in their own words.  Patient given the number to the office. Number also available to the 24- hour suicide hotline.       TREATMENT PLAN/GOALS: Continue medications and treatment plan as indicated. Treatment and medication options discussed during today's visit. Patient acknowledged and verbally consented to continue with current treatment plan and was educated on the importance of compliance with treatment and follow-up appointments.        -Begin Viibryd 10 mg for anxiety/depression  -Continued Wellbutrin  mg twice daily for ADHD/adjunct for depression         MEDICATION ISSUES: Discussed medication options and treatment plan of prescribed medication, any off label use of medication, as well as the risks, benefits, any black box warnings  including increased suicidality, and side effects including but not limited to potential falls, dizziness, possible impaired driving, GI side effects (change in appetite, abdominal discomfort, nausea, vomiting, diarrhea, and/or constipation), dry mouth, somnolence, sedation, insomnia, activation, agitation, irritation, tremors, abnormal muscle movements or disorders, headache, sweating, possible bruising or rare bleeding, electrolyte and/or fluid abnormalities, change in blood pressure/heart rate/and or heart rhythm, sexual dysfunction, and metabolic adversities among others. Patient and/or guardian agreeable to call the office with any worsening of symptoms or onset of side effects, or if any concerns or questions arise.  The contact information for the office is made available to the patient and/or guardian.  Patient and/or guardian agreeable to call 911 or go to the nearest ER should they begin having any SI/HI, or if any urgent concerns arise. No medication side effects or related complaints today.              VERBAL INFORMED CONSENT FOR MEDICATION:  The patient was educated that their proposed/prescribed psychotropic medication(s) has potential risks, side effects, adverse effects, and black box warnings; and these have been discussed with the patient.  The patient has been informed that their treatment and medication dosage is to be individualized, and may even be above or below the recommended range/dosage due to patient individualization and response, but medication is prescribed using a shared decision making approach, and no medication or dosage will be prescribed without the patient's verbal consent.  The reason for the use of the medication including any off label use and alternative modes of treatment other than or in addition to medication has been considered and discussed, the probable consequences of not receiving the proposed treatment have been discussed, and any treatment side effects, black box  warnings, and cautions associated with treatment have been discussed with the patient.  The patient is allowed ample time to openly discuss and ask questions regarding the proposed medication(s) and treatment plan and the patient verbalizes understanding the reasons for the use of the medication, its potential risks and benefits, other alternative treatment(s), and the probable consequences that may occur if the proposed medication is not given.  The patient has been given ample time to ask questions and study the information and find the information to be specific, accurate, and complete.  The patient gives verbal consent for the medication(s) proposed/prescribed, they verbalized understanding that they can refuse and withdraw consent at any time with the assistance of this APRN, and the patient has verbally confirmed that they are aware, and are willing, to take the prescribed medication and follow the treatment plan with the known possible risks, side effect, black box warnings, and any potential medication interactions, and the patient reports they will be worse off without this medication and treatment plan.  The patient is advised to contact this APRN/this office if any questions or concerns arise at any time (at [phone]), or call 911/go to the closest emergency department if needed or outside of office hours.          [facility] No Show Policy:  We understand unexpected circumstances arise; however, anytime you miss your appointment we are unable to provide you appropriate care.  In addition, each appointment missed could have been used to provide care for others.  We ask that you call at least 24 hours in advance to cancel or reschedule an appointment.  We would like to take this opportunity to remind you of our policy stating patients who miss THREE or more appointments without cancelling or rescheduling 24 hours in advance of the appointment may be subject to cancellation of  any further visits with our clinic and recommendation to seek in-person services/visits.    Please call [phone] to reschedule your appointment. If there are reasons that make it difficult for you to keep the appointments, please call and let us know how we can help.  Please understand that medication prescribing will not continue without seeing your provider.      [facility]'s No Show Policy reviewed with patient at today's visit. Patient verbalized understanding of this policy. Discussed with patient that in the event that there are three or more no show visits, it will be recommended that they pursue in-person services/visits as noncompliance with telehealth visits indicates that patient is not an appropriate candidate for telemedicine and would likely be more appropriate for in-person services/visits. Patient verbalizes understanding and is agreeable to this.           MEDS ORDERED DURING VISIT:  New Medications Ordered This Visit   Medications   • buPROPion SR (WELLBUTRIN SR) 150 MG 12 hr tablet     Sig: Take 1 tablet by mouth 2 (Two) Times a Day.     Dispense:  60 tablet     Refill:  1   • vilazodone (VIIBRYD) 10 MG tablet tablet     Sig: Take 1 tablet by mouth Daily.     Dispense:  30 tablet     Refill:  1       Return in about 5 weeks (around 5/11/2023), or if symptoms worsen or fail to improve, for Recheck.        Patient will follow-up in 5 weeks, highly encouraged the patient if he had any questions or concerns to contact the behavioral health virtual clinic for sooner appointment patient verbalized understanding.      Functional Status: Moderate impairment     Prognosis: Fair with Ongoing Treatment             This document has been electronically signed by ELMO Last  April 6, 2023 08:26 EDT      Some of the data in this electronic note has been brought forward from a previous encounter, any necessary changes have been made, it has been reviewed by this APRN, and it  is accurate.       Part of this note may be an electronic transcription/translation of spoken language to printed text using the Dragon Dictation System.

## 2023-05-11 ENCOUNTER — TELEMEDICINE (OUTPATIENT)
Dept: PSYCHIATRY | Facility: CLINIC | Age: 43
End: 2023-05-11
Payer: COMMERCIAL

## 2023-05-11 DIAGNOSIS — F41.1 GENERALIZED ANXIETY DISORDER: Chronic | ICD-10-CM

## 2023-05-11 DIAGNOSIS — F90.8 ADHD, ADULT RESIDUAL TYPE: Chronic | ICD-10-CM

## 2023-05-11 DIAGNOSIS — F33.1 MODERATE EPISODE OF RECURRENT MAJOR DEPRESSIVE DISORDER: Primary | Chronic | ICD-10-CM

## 2023-05-11 PROCEDURE — 99214 OFFICE O/P EST MOD 30 MIN: CPT | Performed by: NURSE PRACTITIONER

## 2023-05-11 RX ORDER — BUPROPION HYDROCHLORIDE 150 MG/1
150 TABLET, EXTENDED RELEASE ORAL 2 TIMES DAILY
Qty: 180 TABLET | Refills: 0 | Status: SHIPPED | OUTPATIENT
Start: 2023-05-11

## 2023-05-11 NOTE — PROGRESS NOTES
"This provider is located at the [facility] (through [facility]), [address], [place], [address] using a secure [employer] Video Visit through [employer]. Patient is being seen remotely via telehealth at their home address in [place], and stated they are in a secure environment for this session. The patient's condition being diagnosed/treated is appropriate for telemedicine. The provider identified herself as well as her credentials. The patient, and/or patients guardian, consent to be seen remotely, and when consent is given they understand that the consent allows for patient identifiable information to be sent to a third party as needed. They may refuse to be seen remotely at any time. The electronic data is encrypted and password protected, and the patient and/or guardian has been advised of the potential risks to privacy not withstanding such measures.    You have chosen to receive care through a telehealth visit.  Do you consent to use a video/audio connection for your medical care today? Yes     Patient identifiers utilized: Name and date of birth.    Patient verbally confirmed consent for today's encounter 05/11/2023.        Subjective   Robert Feliciano is a 42 y.o. male who is here today for medication management follow up.    Chief Complaint: Depression, anxiety, ADHD    History of Present Illness:  The patient describes his mood as \"not too bad\" over the last few weeks.  The patient endorses that overall things have been going okay recently.  He states that still are not quite as well controlled as they have been previously, but states that overall symptoms have continued to be more well-managed compared to his baseline prior to beginning the Wellbutrin and endorses that he feels he is \"managing things okay\".  The patient reports his appetite as good.  The patient reports his sleep as fluctuating.  The patient states that he has had some difficulty falling/staying " asleep at times recently, but states that he hasn't been going to the gym as regularly as he had been which he endorses he feels that has led to recent sleeping difficulties.  The patient denies any nightmares or bad dreams.  The patient denies any new medical problems or changes in medications since last appointment with this facility.  The patient reports he hasn't been taking the Viibryd as he states that it was too expensive, but otherwise compliance with current medication regimen.  The patient denies any current side effects from his current medication regimen.  The patient denies any abnormal muscle movements or tics.  The patient rates his ADHD at a 7/10 on a 0-10 scale, with 10 being the worst.  The patient states that he feels focused in the moment, but states that he sometimes notices his mind is wondering which he feels could be related to recent stress/anxiety.  The patient rates his depression at a 6/10 on a 0-10 scale, with 10 being the worst.  The patient rates his anxiety at a 6/10 on a 0-10 scale, with 10 being the worst.  The patient rates hopelessness at a 0/10 on a 0-10 scale with 10 being the worst.  The patient would like to not adjust or change his medications at this visit.  The patient endorses that he would rather focus on utilization of nonpharmacologic behavioral interventions/modifications and coping mechanisms rather than beginning any changes/adjustments with his medication regimen at this time.  The patient denies suicidal ideations and homicidal ideations, and is convincing.  The patient denies any auditory hallucinations or visual hallucinations.  The patient does not endorse significant symptoms consistent with bipolar disorder or a psychotic illness.            Prior Psychiatric Medications:  Lamictal 200 mg daily - the patient states that he was started on this medication in 2019 and discontinued it approximately 2-3 weeks ago.  The patient states that he was started on this  medication by his PMHNP after she diagnosed him with bipolar II disorder.  The patient states that he felt like his mood was better when he was taking this medication, but states that he did not like that the medication made him feel more fatigued.   Strattera 10 mg daily - caused sexual side effects   Clonidine 0.1 mg nightly as needed for insomnia/anxiety - caused hypotension   Wellbutrin  mg daily - effective for depression/anxiety/ADHD, but caused significant fatigue   Zoloft - ineffective/caused SI        The following portions of the patient's history were reviewed and updated as appropriate: allergies, current medications, past family history, past medical history, past social history, past surgical history and problem list.    Review of Systems   Constitutional: Negative for activity change, appetite change, fatigue and unexpected weight change.   Psychiatric/Behavioral: Positive for decreased concentration, dysphoric mood and sleep disturbance. Negative for agitation, behavioral problems, confusion, hallucinations, self-injury and suicidal ideas. The patient is nervous/anxious. The patient is not hyperactive.        Objective   Physical Exam  Constitutional:       Appearance: Normal appearance.   Neurological:      Mental Status: He is alert.   Psychiatric:         Attention and Perception: Perception normal. He is inattentive.         Mood and Affect: Affect normal. Mood is anxious and depressed.         Speech: Speech normal.         Behavior: Behavior normal. Behavior is cooperative.         Thought Content: Thought content normal. Thought content does not include homicidal or suicidal ideation. Thought content does not include homicidal or suicidal plan.         Cognition and Memory: Cognition and memory normal.         Judgment: Judgment normal.       There were no vitals taken for this visit.    The patient was seen remotely today via a MyChart Video Visit through [facility].  Unable to obtain vital  signs due to nature of remote visit.  Height stated at 67 inches.  Weight stated at 210 pounds.     No Known Allergies    No results found for this or any previous visit (from the past 2016 hour(s)).    Current Medications:   Current Outpatient Medications   Medication Sig Dispense Refill   • buPROPion SR (WELLBUTRIN SR) 150 MG 12 hr tablet Take 1 tablet by mouth 2 (Two) Times a Day. 180 tablet 0     No current facility-administered medications for this visit.       Mental Status Exam:   Hygiene:   good  Cooperation:  Cooperative  Eye Contact:  Good  Psychomotor Behavior:  Appropriate  Affect:  Full range  Hopelessness: Denies  Speech:  Normal  Thought Process:  Goal directed and Linear  Thought Content:  Normal  Suicidal:  None  Homicidal:  None  Hallucinations:  None  Delusion:  None  Memory:  Intact  Orientation:  Person, Place, Time and Situation  Reliability:  good  Insight:  Good  Judgement:  Good  Impulse Control:  Good  Physical/Medical Issues:  Yes See medical histry          Assessment & Plan   Diagnoses and all orders for this visit:    1. Moderate episode of recurrent major depressive disorder (Primary)  -     buPROPion SR (WELLBUTRIN SR) 150 MG 12 hr tablet; Take 1 tablet by mouth 2 (Two) Times a Day.  Dispense: 180 tablet; Refill: 0    2. Generalized anxiety disorder    3. ADHD, adult residual type  -     buPROPion SR (WELLBUTRIN SR) 150 MG 12 hr tablet; Take 1 tablet by mouth 2 (Two) Times a Day.  Dispense: 180 tablet; Refill: 0            Visit Diagnoses:    ICD-10-CM ICD-9-CM   1. Moderate episode of recurrent major depressive disorder  F33.1 296.32   2. Generalized anxiety disorder  F41.1 300.02   3. ADHD, adult residual type  F90.8 314.01       GOALS:  Short Term Goals: Patient will be compliant with medication, and patient will have no significant medication related side effects.  Patient will be engaged in psychotherapy as indicated.  Patient will report subjective improvement of symptoms.  Long  term goals: To stabilize mood and treat/improve subjective symptoms, the patient will stay out of the hospital, the patient will be at an optimal level of functioning, and the patient will take all medications as prescribed.  The patient verbalized understanding and agreement with goals that were mutually set.      SUICIDE RISK ASSESSMENT: Unalterable demographics and a history of mental health intervention indicate this patient is in a high risk category compared to the general population. At present, the patient denies active SI/HI, intentions, or plans at this time and agrees to seek immediate care should such thoughts develop. The patient verbalizes understanding of how to access emergency care if needed and agrees to do so. Consideration of suicide risk and protective factors such as history, current presentation, individual strengths and weaknesses, psychosocial and environmental stressors and variables, psychiatric illness and symptoms, medical conditions and pain, took place in this interview. Based on those considerations, the patient is determined: within individual baseline and presenting no imminent risk for suicide or homicide. Other recommendations: The patient does not meet the criteria for inpatient admission and is not a safety risk to self or others at today's visit. Inpatient treatment offers no significant advantages over outpatient treatment for this patient at today's visit.      SAFETY PLAN:  Patient was given ample time for questions and fully participated in treatment planning.  Patient was encouraged to call the clinic with any questions or concerns.  Patient was informed of access to emergency care. If patient were to develop any significant symptomatology, suicidal ideation, homicidal ideation, any concerns, or feel unsafe at any time they are to call the clinic and if unable to get immediate assistance should immediately call 911 or go to the nearest emergency room.  The patient is  advised to remove or secure (lock away) all lethal weapons (including guns) and sharps (including razors, scissors, knives, etc.).  All medications (including any prescribed and any over the counter medications) should be stored in a safe and secured location that is not obtainable by children/adolescents.  Patient was given an opportunity and encouraged to ask questions about their medication, illness, and treatment. Patient contracted verbally for the following: If you are experiencing an emotional crisis or have thoughts of harming yourself or others, please go to your nearest local emergency room or call 911. Will continue to re-assess medication response and side effects frequently to establish efficacy and ensure safety. Risks, any black box warnings, side effects, off label usage, and benefits of medication and treatment discussed with patient, along with potential adverse side effects of current and/or newly prescribed medication, alternative treatment options, and OTC medications.  Patient verbalized understanding of potential risks, any off label use of medication, any black box warnings, and any side effects in their own words. The patient verbalized understanding and agreed to comply with the safety plan discussed in their own words.  Patient given the number to the office. Number also available to the 24- hour suicide hotline.       TREATMENT PLAN/GOALS: Continue medications and treatment plan as indicated. Treatment and medication options discussed during today's visit. Patient acknowledged and verbally consented to continue with current treatment plan and was educated on the importance of compliance with treatment and follow-up appointments.        -Continued Wellbutrin  mg twice daily for ADHD/adjunct for depression         MEDICATION ISSUES: Discussed medication options and treatment plan of prescribed medication, any off label use of medication, as well as the risks, benefits, any black box  warnings including increased suicidality, and side effects including but not limited to potential falls, dizziness, possible impaired driving, GI side effects (change in appetite, abdominal discomfort, nausea, vomiting, diarrhea, and/or constipation), dry mouth, somnolence, sedation, insomnia, activation, agitation, irritation, tremors, abnormal muscle movements or disorders, headache, sweating, possible bruising or rare bleeding, electrolyte and/or fluid abnormalities, change in blood pressure/heart rate/and or heart rhythm, sexual dysfunction, and metabolic adversities among others. Patient and/or guardian agreeable to call the office with any worsening of symptoms or onset of side effects, or if any concerns or questions arise.  The contact information for the office is made available to the patient and/or guardian.  Patient and/or guardian agreeable to call 911 or go to the nearest ER should they begin having any SI/HI, or if any urgent concerns arise. No medication side effects or related complaints today.            VERBAL INFORMED CONSENT FOR MEDICATION:  The patient was educated that their proposed/prescribed psychotropic medication(s) has potential risks, side effects, adverse effects, and black box warnings; and these have been discussed with the patient.  The patient has been informed that their treatment and medication dosage is to be individualized, and may even be above or below the recommended range/dosage due to patient individualization and response, but medication is prescribed using a shared decision making approach, and no medication or dosage will be prescribed without the patient's verbal consent.  The reason for the use of the medication including any off label use and alternative modes of treatment other than or in addition to medication has been considered and discussed, the probable consequences of not receiving the proposed treatment have been discussed, and any treatment side effects,  black box warnings, and cautions associated with treatment have been discussed with the patient.  The patient is allowed ample time to openly discuss and ask questions regarding the proposed medication(s) and treatment plan and the patient verbalizes understanding the reasons for the use of the medication, its potential risks and benefits, other alternative treatment(s), and the probable consequences that may occur if the proposed medication is not given.  The patient has been given ample time to ask questions and study the information and find the information to be specific, accurate, and complete.  The patient gives verbal consent for the medication(s) proposed/prescribed, they verbalized understanding that they can refuse and withdraw consent at any time with the assistance of this APRN, and the patient has verbally confirmed that they are aware, and are willing, to take the prescribed medication and follow the treatment plan with the known possible risks, side effect, black box warnings, and any potential medication interactions, and the patient reports they will be worse off without this medication and treatment plan.  The patient is advised to contact this APRN/this office if any questions or concerns arise at any time (at [phone]), or call 911/go to the closest emergency department if needed or outside of office hours.          [facility] No Show Policy:  We understand unexpected circumstances arise; however, anytime you miss your appointment we are unable to provide you appropriate care.  In addition, each appointment missed could have been used to provide care for others.  We ask that you call at least 24 hours in advance to cancel or reschedule an appointment.  We would like to take this opportunity to remind you of our policy stating patients who miss THREE or more appointments without cancelling or rescheduling 24 hours in advance of the appointment may be subject to  cancellation of any further visits with our clinic and recommendation to seek in-person services/visits.    Please call [phone] to reschedule your appointment. If there are reasons that make it difficult for you to keep the appointments, please call and let us know how we can help.  Please understand that medication prescribing will not continue without seeing your provider.      [facility]'s No Show Policy reviewed with patient at today's visit. Patient verbalized understanding of this policy. Discussed with patient that in the event that there are three or more no show visits, it will be recommended that they pursue in-person services/visits as noncompliance with telehealth visits indicates that patient is not an appropriate candidate for telemedicine and would likely be more appropriate for in-person services/visits. Patient verbalizes understanding and is agreeable to this.           MEDS ORDERED DURING VISIT:  New Medications Ordered This Visit   Medications   • buPROPion SR (WELLBUTRIN SR) 150 MG 12 hr tablet     Sig: Take 1 tablet by mouth 2 (Two) Times a Day.     Dispense:  180 tablet     Refill:  0       Return in about 2 months (around 7/11/2023), or if symptoms worsen or fail to improve, for Recheck.        Patient will follow-up in 2 months, highly encouraged the patient if he had any questions or concerns to contact the behavioral health virtual clinic for sooner appointment patient verbalized understanding.      Functional Status: Moderate impairment     Prognosis: Fair with Ongoing Treatment             This document has been electronically signed by ELMO Last  May 11, 2023 08:19 EDT      Some of the data in this electronic note has been brought forward from a previous encounter, any necessary changes have been made, it has been reviewed by this APRN, and it is accurate.       Part of this note may be an electronic transcription/translation of spoken language to  printed text using the Dragon Dictation System.

## 2023-09-01 ENCOUNTER — LAB (OUTPATIENT)
Dept: LAB | Facility: HOSPITAL | Age: 43
End: 2023-09-01
Payer: COMMERCIAL

## 2023-09-01 ENCOUNTER — OFFICE VISIT (OUTPATIENT)
Dept: FAMILY MEDICINE CLINIC | Facility: CLINIC | Age: 43
End: 2023-09-01
Payer: COMMERCIAL

## 2023-09-01 VITALS
BODY MASS INDEX: 31.8 KG/M2 | HEART RATE: 60 BPM | WEIGHT: 202.6 LBS | DIASTOLIC BLOOD PRESSURE: 72 MMHG | TEMPERATURE: 98.2 F | OXYGEN SATURATION: 97 % | SYSTOLIC BLOOD PRESSURE: 124 MMHG | HEIGHT: 67 IN

## 2023-09-01 DIAGNOSIS — Z80.42 FAMILY HISTORY OF PROSTATE CANCER IN FATHER: ICD-10-CM

## 2023-09-01 DIAGNOSIS — Z12.5 PROSTATE CANCER SCREENING: ICD-10-CM

## 2023-09-01 DIAGNOSIS — Z00.00 WELL ADULT EXAM: Primary | ICD-10-CM

## 2023-09-01 DIAGNOSIS — E66.09 CLASS 1 OBESITY DUE TO EXCESS CALORIES WITHOUT SERIOUS COMORBIDITY WITH BODY MASS INDEX (BMI) OF 31.0 TO 31.9 IN ADULT: ICD-10-CM

## 2023-09-01 DIAGNOSIS — Z12.11 COLON CANCER SCREENING: ICD-10-CM

## 2023-09-01 DIAGNOSIS — R07.9 CHEST PAIN, UNSPECIFIED TYPE: ICD-10-CM

## 2023-09-01 DIAGNOSIS — Z00.00 WELL ADULT EXAM: ICD-10-CM

## 2023-09-01 PROBLEM — E66.811 CLASS 1 OBESITY DUE TO EXCESS CALORIES WITHOUT SERIOUS COMORBIDITY WITH BODY MASS INDEX (BMI) OF 31.0 TO 31.9 IN ADULT: Status: ACTIVE | Noted: 2023-09-01

## 2023-09-01 LAB
ALBUMIN SERPL-MCNC: 4.7 G/DL (ref 3.5–5.2)
ALBUMIN/GLOB SERPL: 2 G/DL
ALP SERPL-CCNC: 81 U/L (ref 39–117)
ALT SERPL W P-5'-P-CCNC: 14 U/L (ref 1–41)
ANION GAP SERPL CALCULATED.3IONS-SCNC: 10 MMOL/L (ref 5–15)
AST SERPL-CCNC: 19 U/L (ref 1–40)
BASOPHILS # BLD AUTO: 0.04 10*3/MM3 (ref 0–0.2)
BASOPHILS NFR BLD AUTO: 0.7 % (ref 0–1.5)
BILIRUB SERPL-MCNC: 0.5 MG/DL (ref 0–1.2)
BUN SERPL-MCNC: 17 MG/DL (ref 6–20)
BUN/CREAT SERPL: 14.8 (ref 7–25)
CALCIUM SPEC-SCNC: 9.7 MG/DL (ref 8.6–10.5)
CHLORIDE SERPL-SCNC: 101 MMOL/L (ref 98–107)
CHOLEST SERPL-MCNC: 110 MG/DL (ref 0–200)
CO2 SERPL-SCNC: 26 MMOL/L (ref 22–29)
CREAT SERPL-MCNC: 1.15 MG/DL (ref 0.76–1.27)
DEPRECATED RDW RBC AUTO: 39.1 FL (ref 37–54)
EGFRCR SERPLBLD CKD-EPI 2021: 80 ML/MIN/1.73
EOSINOPHIL # BLD AUTO: 0.15 10*3/MM3 (ref 0–0.4)
EOSINOPHIL NFR BLD AUTO: 2.5 % (ref 0.3–6.2)
ERYTHROCYTE [DISTWIDTH] IN BLOOD BY AUTOMATED COUNT: 12.7 % (ref 12.3–15.4)
GLOBULIN UR ELPH-MCNC: 2.4 GM/DL
GLUCOSE SERPL-MCNC: 86 MG/DL (ref 65–99)
HBA1C MFR BLD: 5.3 % (ref 4.8–5.6)
HCT VFR BLD AUTO: 48.1 % (ref 37.5–51)
HDLC SERPL-MCNC: 52 MG/DL (ref 40–60)
HGB BLD-MCNC: 16.6 G/DL (ref 13–17.7)
IMM GRANULOCYTES # BLD AUTO: 0.02 10*3/MM3 (ref 0–0.05)
IMM GRANULOCYTES NFR BLD AUTO: 0.3 % (ref 0–0.5)
LDLC SERPL CALC-MCNC: 47 MG/DL (ref 0–100)
LDLC/HDLC SERPL: 0.94 {RATIO}
LYMPHOCYTES # BLD AUTO: 1.8 10*3/MM3 (ref 0.7–3.1)
LYMPHOCYTES NFR BLD AUTO: 29.8 % (ref 19.6–45.3)
MCH RBC QN AUTO: 29.7 PG (ref 26.6–33)
MCHC RBC AUTO-ENTMCNC: 34.5 G/DL (ref 31.5–35.7)
MCV RBC AUTO: 86 FL (ref 79–97)
MONOCYTES # BLD AUTO: 0.46 10*3/MM3 (ref 0.1–0.9)
MONOCYTES NFR BLD AUTO: 7.6 % (ref 5–12)
NEUTROPHILS NFR BLD AUTO: 3.57 10*3/MM3 (ref 1.7–7)
NEUTROPHILS NFR BLD AUTO: 59.1 % (ref 42.7–76)
NRBC BLD AUTO-RTO: 0 /100 WBC (ref 0–0.2)
PLATELET # BLD AUTO: 202 10*3/MM3 (ref 140–450)
PMV BLD AUTO: 9.8 FL (ref 6–12)
POTASSIUM SERPL-SCNC: 4.1 MMOL/L (ref 3.5–5.2)
PROT SERPL-MCNC: 7.1 G/DL (ref 6–8.5)
PSA SERPL-MCNC: 0.86 NG/ML (ref 0–4)
RBC # BLD AUTO: 5.59 10*6/MM3 (ref 4.14–5.8)
SODIUM SERPL-SCNC: 137 MMOL/L (ref 136–145)
TRIGL SERPL-MCNC: 46 MG/DL (ref 0–150)
VLDLC SERPL-MCNC: 11 MG/DL (ref 5–40)
WBC NRBC COR # BLD: 6.04 10*3/MM3 (ref 3.4–10.8)

## 2023-09-01 PROCEDURE — 99396 PREV VISIT EST AGE 40-64: CPT | Performed by: FAMILY MEDICINE

## 2023-09-01 PROCEDURE — 80053 COMPREHEN METABOLIC PANEL: CPT

## 2023-09-01 PROCEDURE — 80061 LIPID PANEL: CPT

## 2023-09-01 PROCEDURE — 85025 COMPLETE CBC W/AUTO DIFF WBC: CPT

## 2023-09-01 PROCEDURE — G0103 PSA SCREENING: HCPCS

## 2023-09-01 PROCEDURE — 83036 HEMOGLOBIN GLYCOSYLATED A1C: CPT

## 2023-09-01 PROCEDURE — 93000 ELECTROCARDIOGRAM COMPLETE: CPT | Performed by: FAMILY MEDICINE

## 2023-09-01 NOTE — PROGRESS NOTES
"Robert Feliciano is a 43 y.o. male who presents today to complete annual exam.    Chief Complaint   Patient presents with    Annual Exam        Patient is here for annual. Patient's father was recently diagnosed with terminal lung cancer. He has had some chest pain on and off for the past two weeks. He states it has occurred 3 times this week. He states the pain last for about 5-6 minutes and then resolves on its own. He states the pain is in the center of his chest and feels like \"needles\". It does not radiate. 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 It usually happens at work or right after his shift. He denies SOA, palpitations, numbness, light headed, dizzy, diaphoresis, vision changes, N/V, or edema. He exercises on treadmill 30 minutes 3 days a week and does not experience any chest pain during this activity. He has not noticed anything that makes the pain worse or brings it on. He feels like standing up and walking around helps. He has not taken any medications for this. He eats a a lot of pre made meals from [employer] but has been trying to eat healthier diet. He sees the dentist twice a year. He has never seen the optometrist or dermatologist.        Review of Systems   Constitutional:  Negative for diaphoresis, fatigue, fever and unexpected weight loss.   HENT:  Negative for congestion, ear pain and sore throat.    Eyes:  Negative for blurred vision, double vision and visual disturbance.   Respiratory:  Negative for cough, shortness of breath and wheezing.    Cardiovascular:  Positive for chest pain. Negative for palpitations and leg swelling.   Gastrointestinal:  Negative for abdominal pain, blood in stool, constipation, diarrhea, nausea, vomiting and GERD.   Endocrine: Negative for polydipsia and polyuria.   Genitourinary:  Negative for difficulty urinating.   Musculoskeletal:  Negative for joint swelling.   Skin:  Negative for rash and skin lesions.   Allergic/Immunologic: " Negative for environmental allergies.   Neurological:  Negative for dizziness, seizures, syncope, light-headedness and headache.   Hematological:  Does not bruise/bleed easily.   Psychiatric/Behavioral:  Negative for suicidal ideas.           2023     8:17 AM   PHQ-2/PHQ-9 Depression Screening   Little Interest or Pleasure in Doing Things 1-->several days   Feeling Down, Depressed or Hopeless 1-->several days   PHQ-9: Brief Depression Severity Measure Score 2       Past Medical History:   Diagnosis Date    Allergic     Anal fissure     Bipolar disorder     RADHA (generalized anxiety disorder)     History of alcohol dependence         Past Surgical History:   Procedure Laterality Date    CYSTOSCOPY  2017    HEMORRHOIDECTOMY  2015    PROCTOSCOPY  2017    WISDOM TOOTH EXTRACTION Bilateral 1997        Family History   Problem Relation Age of Onset    Osteoarthritis Mother     Anxiety disorder Mother     Depression Mother     Scoliosis Mother     Cancer Father     Hypertension Father     Prostate cancer Father     Dementia Father     Other Father         radiation prostatitis    Anxiety disorder Father     Alcohol abuse Maternal Grandmother     Obesity Maternal Grandfather     Other Paternal Grandmother         unknown    Other Paternal Grandfather         unknown    Diabetes Paternal Uncle     Hypertension Sister         Social History     Socioeconomic History    Marital status:      Spouse name: Haresh    Number of children: 0    Years of education: H.S.    Highest education level: High school graduate   Tobacco Use    Smoking status: Former     Packs/day: 1.00     Years: 10.00     Pack years: 10.00     Types: Cigarettes     Start date:      Quit date:      Years since quittin.6    Smokeless tobacco: Never   Vaping Use    Vaping Use: Never used   Substance and Sexual Activity    Alcohol use: Not Currently     Comment: Recently sober    Drug use: Not Currently     Types: Marijuana      "Comment: last use 2010    Sexual activity: Yes     Partners: Male     Birth control/protection: None     Comment: 1 partner in past year.         Current Outpatient Medications on File Prior to Visit   Medication Sig Dispense Refill    buPROPion SR (WELLBUTRIN SR) 150 MG 12 hr tablet Take 1 tablet by mouth 2 (Two) Times a Day. 180 tablet 0     No current facility-administered medications on file prior to visit.       No Known Allergies     Visit Vitals  /72   Pulse 60   Temp 98.2 øF (36.8 øC) (Infrared)   Ht 170.2 cm (67\")   Wt 91.9 kg (202 lb 9.6 oz)   SpO2 97%   BMI 31.73 kg/mý      Body mass index is 31.73 kg/mý.    Physical Exam  Constitutional:       General: He is not in acute distress.     Appearance: He is well-developed. He is not diaphoretic.   HENT:      Head: Atraumatic.   Cardiovascular:      Rate and Rhythm: Normal rate and regular rhythm.      Heart sounds: Normal heart sounds. No murmur heard.    No friction rub. No gallop.   Pulmonary:      Effort: Pulmonary effort is normal. No respiratory distress.      Breath sounds: Normal breath sounds. No stridor. No wheezing, rhonchi or rales.   Abdominal:      General: Bowel sounds are normal. There is no distension.      Palpations: Abdomen is soft. There is no mass.      Tenderness: There is no abdominal tenderness. There is no guarding or rebound.      Hernia: No hernia is present.   Musculoskeletal:      Cervical back: Normal range of motion and neck supple.   Skin:     General: Skin is warm and dry.   Neurological:      Mental Status: He is alert and oriented to person, place, and time.   Psychiatric:         Behavior: Behavior normal.        Results for orders placed or performed in visit on 05/05/21   Comprehensive Metabolic Panel    Specimen: Arm, Left; Blood   Result Value Ref Range    Glucose 87 65 - 99 mg/dL    BUN 17 6 - 20 mg/dL    Creatinine 0.92 0.76 - 1.27 mg/dL    Sodium 138 136 - 145 mmol/L    Potassium 4.4 3.5 - 5.2 mmol/L    " Chloride 102 98 - 107 mmol/L    CO2 27.9 22.0 - 29.0 mmol/L    Calcium 9.2 8.6 - 10.5 mg/dL    Total Protein 7.0 6.0 - 8.5 g/dL    Albumin 4.60 3.50 - 5.20 g/dL    ALT (SGPT) 20 1 - 41 U/L    AST (SGOT) 18 1 - 40 U/L    Alkaline Phosphatase 72 39 - 117 U/L    Total Bilirubin 0.4 0.0 - 1.2 mg/dL    eGFR Non African Amer 90 >60 mL/min/1.73    Globulin 2.4 gm/dL    A/G Ratio 1.9 g/dL    BUN/Creatinine Ratio 18.5 7.0 - 25.0    Anion Gap 8.1 5.0 - 15.0 mmol/L   PSA Screen    Specimen: Arm, Left; Blood   Result Value Ref Range    PSA 0.681 0.000 - 4.000 ng/mL   CBC Auto Differential    Specimen: Arm, Left; Blood   Result Value Ref Range    WBC 4.68 3.40 - 10.80 10*3/mm3    RBC 5.67 4.14 - 5.80 10*6/mm3    Hemoglobin 17.0 13.0 - 17.7 g/dL    Hematocrit 51.0 37.5 - 51.0 %    MCV 89.9 79.0 - 97.0 fL    MCH 30.0 26.6 - 33.0 pg    MCHC 33.3 31.5 - 35.7 g/dL    RDW 13.0 12.3 - 15.4 %    RDW-SD 42.5 37.0 - 54.0 fl    MPV 9.6 6.0 - 12.0 fL    Platelets 189 140 - 450 10*3/mm3    Neutrophil % 50.5 42.7 - 76.0 %    Lymphocyte % 36.5 19.6 - 45.3 %    Monocyte % 10.5 5.0 - 12.0 %    Eosinophil % 1.9 0.3 - 6.2 %    Basophil % 0.4 0.0 - 1.5 %    Immature Grans % 0.2 0.0 - 0.5 %    Neutrophils, Absolute 2.36 1.70 - 7.00 10*3/mm3    Lymphocytes, Absolute 1.71 0.70 - 3.10 10*3/mm3    Monocytes, Absolute 0.49 0.10 - 0.90 10*3/mm3    Eosinophils, Absolute 0.09 0.00 - 0.40 10*3/mm3    Basophils, Absolute 0.02 0.00 - 0.20 10*3/mm3    Immature Grans, Absolute 0.01 0.00 - 0.05 10*3/mm3    nRBC 0.0 0.0 - 0.2 /100 WBC        ECG 12 Lead    Date/Time: 9/1/2023 8:45 AM  Performed by: Mike Restrepo MD  Authorized by: Mike Restrepo MD   Comparison: not compared with previous ECG   Previous ECG: no previous ECG available  Rhythm: sinus bradycardia  Rate: bradycardic  Conduction: 1st degree AV block  ST Segments: ST segments normal  T Waves: T waves normal  QRS axis: normal    Clinical impression: abnormal EKG        Problems Addressed this  Visit          Cardiac and Vasculature    Chest pain     Chest pain is intermittent and usually happens when he is at work.  It comes on and resolves on his own.  He is found no alleviating or exacerbating factors.  EKG showed sinus bradycardia with first-degree AV block.  Patient was given referral to heart valve clinic for further evaluation and treatment.  RTC/ED precautions given.         Relevant Orders    ECG 12 Lead    Ambulatory Referral to [facility]       Endocrine and Metabolic    Class 1 obesity due to excess calories without serious comorbidity with body mass index (BMI) of 31.0 to 31.9 in adult    Relevant Orders    Comprehensive Metabolic Panel    CBC & Differential    Lipid Panel    Hemoglobin A1c       Family History    Family history of prostate cancer in father    Relevant Orders    PSA Screen       Gastrointestinal Abdominal     Colon cancer screening    Relevant Orders    Cologuard - Stool, Per Rectum       Genitourinary and Reproductive     Prostate cancer screening    Relevant Orders    PSA Screen       Health Encounters    Well adult exam - Primary     The patient is here for health maintenance visit.  Currently, the patient consumes a unhealthy diet and has an adequate exercise regimen.  Screening lab work is ordered.  Immunizations were reviewed today.  Advice and education was given regarding nutrition, aerobic exercise, routine dental evaluations, routine eye exams, reproductive health, cardiovascular risk reduction, sunscreen use, self skin examination (annual dermatology evaluations) and seatbelt use (general overall safety).  Further recommendations will be given if needed after lab evaluation.  Annual wellness evaluation is recommended.           Relevant Orders    Comprehensive Metabolic Panel    CBC & Differential    Lipid Panel    PSA Screen    Hemoglobin A1c     Diagnoses         Codes Comments    Well adult exam    -  Primary ICD-10-CM:  Z00.00  ICD-9-CM: V70.0     Family history of prostate cancer in father     ICD-10-CM: Z80.42  ICD-9-CM: V16.42     Class 1 obesity due to excess calories without serious comorbidity with body mass index (BMI) of 31.0 to 31.9 in adult     ICD-10-CM: E66.09, Z68.31  ICD-9-CM: 278.00, V85.31     Chest pain, unspecified type     ICD-10-CM: R07.9  ICD-9-CM: 786.50     Colon cancer screening     ICD-10-CM: Z12.11  ICD-9-CM: V76.51     Prostate cancer screening     ICD-10-CM: Z12.5  ICD-9-CM: V76.44             Return in about 1 year (around 9/1/2024) for Annual or sooner if needed.    Mike Restrepo MD  9/1/2023

## 2023-09-01 NOTE — PATIENT INSTRUCTIONS

## 2023-09-01 NOTE — ASSESSMENT & PLAN NOTE
Chest pain is intermittent and usually happens when he is at work.  It comes on and resolves on his own.  He is found no alleviating or exacerbating factors.  EKG showed sinus bradycardia with first-degree AV block.  Patient was given referral to heart valve clinic for further evaluation and treatment.  RTC/ED precautions given.

## 2023-09-05 ENCOUNTER — TELEPHONE (OUTPATIENT)
Dept: FAMILY MEDICINE CLINIC | Facility: CLINIC | Age: 43
End: 2023-09-05
Payer: COMMERCIAL

## 2023-09-05 NOTE — TELEPHONE ENCOUNTER
Okay for the HUB to read:     \Please let the patient know that labs that were drawn at previous visit were stable. Patient should continue current treatment plan.  If patient has any questions they can contact me.

## 2023-09-07 NOTE — TELEPHONE ENCOUNTER
Hub/front can read       Please let pt know it means to do the same as what you are already doing

## 2023-09-07 NOTE — TELEPHONE ENCOUNTER
"Caller: Robert Feliciano \"Mat\"    Relationship: Self    Best call back number: [phone]     MESSAGE WAS RELAYED. HE ASKED WHAT WAS MEANT BY THE CURRENT TREATMENT PLAN.    "

## 2023-09-14 ENCOUNTER — TELEPHONE (OUTPATIENT)
Dept: FAMILY MEDICINE CLINIC | Facility: CLINIC | Age: 43
End: 2023-09-14

## 2023-09-14 NOTE — TELEPHONE ENCOUNTER
"    Caller: Robert Feliciano \"Mat\"    Relationship: Self    Best call back number: [phone]     What is the medical concern/diagnosis: COLON CANCER SCREENING    What specialty or service is being requested: COLONOSCOPY    What is the provider, practice or medical service name: [facility]    What is the office location: [facility]. [address]    What is the office phone number: [phone]    Any additional details: PATIENT WOULD RATHER SKIP THE COLO GUARD TEST.        "

## 2023-09-19 DIAGNOSIS — Z12.11 COLON CANCER SCREENING: Primary | ICD-10-CM

## 2023-09-20 ENCOUNTER — TELEPHONE (OUTPATIENT)
Dept: CARDIOLOGY | Facility: HOSPITAL | Age: 43
End: 2023-09-20
Payer: COMMERCIAL

## 2023-09-20 ENCOUNTER — TELEPHONE (OUTPATIENT)
Dept: FAMILY MEDICINE CLINIC | Facility: CLINIC | Age: 43
End: 2023-09-20
Payer: COMMERCIAL

## 2023-09-20 NOTE — TELEPHONE ENCOUNTER
[facility] by Dr. Mike Restrepo. Several attempts have been made to contact the patient with no success. Letter was mailed to the Patient to contact the office to schedule. Referral routed back to the referring physician.

## 2023-09-20 NOTE — TELEPHONE ENCOUNTER
[facility] CALLED AND SAID THEY RECEIVED A FACESHEET ONLY.  IF A REFERRAL IS NEEDED.  THEY WILL NEED OFFICE NOTES, REFERRAL SHEET AND INSURANCE CARDS.      THEY CAN NOT SCHEDULE WITH WHAT THEY RECEIVED THIS MORNING.

## 2023-10-03 ENCOUNTER — TELEPHONE (OUTPATIENT)
Dept: PSYCHIATRY | Facility: CLINIC | Age: 43
End: 2023-10-03
Payer: COMMERCIAL

## 2023-10-16 ENCOUNTER — TELEMEDICINE (OUTPATIENT)
Dept: PSYCHIATRY | Facility: CLINIC | Age: 43
End: 2023-10-16
Payer: COMMERCIAL

## 2023-10-16 DIAGNOSIS — F90.8 ADHD, ADULT RESIDUAL TYPE: Primary | Chronic | ICD-10-CM

## 2023-10-16 DIAGNOSIS — Z63.4 BEREAVEMENT: ICD-10-CM

## 2023-10-16 DIAGNOSIS — F33.1 MODERATE EPISODE OF RECURRENT MAJOR DEPRESSIVE DISORDER: Chronic | ICD-10-CM

## 2023-10-16 DIAGNOSIS — F41.1 GENERALIZED ANXIETY DISORDER: Chronic | ICD-10-CM

## 2023-10-16 PROCEDURE — 99214 OFFICE O/P EST MOD 30 MIN: CPT | Performed by: NURSE PRACTITIONER

## 2023-10-16 RX ORDER — BUPROPION HYDROCHLORIDE 150 MG/1
150 TABLET ORAL DAILY
Qty: 90 TABLET | Refills: 0 | Status: SHIPPED | OUTPATIENT
Start: 2023-10-16 | End: 2023-10-16

## 2023-10-16 RX ORDER — BUPROPION HYDROCHLORIDE 300 MG/1
300 TABLET ORAL DAILY
Qty: 90 TABLET | Refills: 0 | Status: SHIPPED | OUTPATIENT
Start: 2023-10-16

## 2023-10-16 SDOH — SOCIAL STABILITY - SOCIAL INSECURITY: DISSAPEARANCE AND DEATH OF FAMILY MEMBER: Z63.4

## 2023-10-16 NOTE — PROGRESS NOTES
This provider is located at the [facility] (through [facility]), [address], [facility], [address] using a secure [employer] Video Visit through [employer]. Patient is being seen remotely via telehealth at their home address in [place], and stated they are in a secure environment for this session. The patient's condition being diagnosed/treated is appropriate for telemedicine. The provider identified herself as well as her credentials. The patient, and/or patients guardian, consent to be seen remotely, and when consent is given they understand that the consent allows for patient identifiable information to be sent to a third party as needed. They may refuse to be seen remotely at any time. The electronic data is encrypted and password protected, and the patient and/or guardian has been advised of the potential risks to privacy not withstanding such measures.    You have chosen to receive care through a telehealth visit.  Do you consent to use a video/audio connection for your medical care today? Yes     Patient identifiers utilized: Name and date of birth.    Patient verbally confirmed consent for today's encounter  10/16/2023 .    The patient does verbally confirm they are being seen today while physically located in the [facility].  This provider/this APRN is licensed in the [facility] where the patient is located/being seen.           Subjective   Robert Feliciano is a 43 y.o. male who is here today for medication management follow up.    Chief Complaint: Depression, anxiety, ADHD    History of Present Illness: The patient reports that he has not been doing that well over the past few weeks.  He states that his father passed away on 9/28/23 after 5 days in hospice care.  He states that his symptoms have been rather increased over the past few weeks as he has been grieving the loss of his father.  He states that with everything that has been going on in his life  recently did not feel the Wellbutrin SR was lasting throughout his stay and that it was not quite as effective as it had been previously for managing symptoms, but states that he opted to try switching to the Wellbutrin  mg daily from a previous prescription.  The patient states that he has tolerated this medication well when retrialed, and denies any side effects including fatigue which he had reported when taking this medication in the past.  He states that it has been more effective for managing his symptoms as well as lasting throughout his day.  He states that he would like to continue with taking the Wellbutrin XL at current dose.  Discussed with the patient potentially increasing the dose of Wellbutrin XL at this time for further management of symptoms, but the patient endorses that he would like to continue the Wellbutrin XL at current dose and that he feels his exacerbation of symptoms at this time is solely due to his bereavement.  He states that he feels being in therapy would be more beneficial for him at this time, and requests a referral be placed for him to establish in therapy with the clinic.  The patient reports his appetite as good.  The patient reports his sleep as fluctuating.  The patient reports that approximately 1-2 nights per week she will experience some difficulty falling asleep, but states that otherwise he has been sleeping okay.  The patient denies any nightmares or bad dreams.  The patient denies any new medical problems or changes in medications since last appointment with this facility.  The patient denies any abnormal muscle movements or tics.  The patient rates his ADHD at a 9/10 on a 0-10 scale, with 10 being the worst.  The patient rates his depression at a 9/10 on a 0-10 scale, with 10 being the worst.  The patient rates his anxiety at a 9/10 on a 0-10 scale, with 10 being the worst.  The patient rates hopelessness at a 0/10 on a 0-10 scale with 10 being the worst.  The  patient denies suicidal ideations and homicidal ideations, and is convincing.  The patient denies any auditory hallucinations or visual hallucinations.  The patient does not endorse significant symptoms consistent with bipolar disorder or a psychotic illness.          Prior Psychiatric Medications:  Lamictal 200 mg daily - the patient states that he was started on this medication in 2019 and discontinued it approximately 2-3 weeks ago.  The patient states that he was started on this medication by his PMHNP after she diagnosed him with bipolar II disorder.  The patient states that he felt like his mood was better when he was taking this medication, but states that he did not like that the medication made him feel more fatigued.   Strattera 10 mg daily - caused sexual side effects   Clonidine 0.1 mg nightly as needed for insomnia/anxiety - caused hypotension   Zoloft - ineffective/caused SI  Wellbutrin  mg twice daily -initially effective for depression/anxiety/ADHD, but later seems to have lost efficacy        The following portions of the patient's history were reviewed and updated as appropriate: allergies, current medications, past family history, past medical history, past social history, past surgical history and problem list.    Review of Systems   Constitutional:  Negative for activity change, appetite change, fatigue and unexpected weight change.   Psychiatric/Behavioral:  Positive for decreased concentration, dysphoric mood and sleep disturbance. Negative for agitation, behavioral problems, confusion, hallucinations, self-injury and suicidal ideas. The patient is nervous/anxious. The patient is not hyperactive.        Objective   Physical Exam  Constitutional:       Appearance: Normal appearance.   Neurological:      Mental Status: He is alert.   Psychiatric:         Attention and Perception: Attention and perception normal.         Mood and Affect: Mood is anxious and depressed. Affect is flat.          Speech: Speech normal.         Behavior: Behavior is agitated. Behavior is cooperative.         Thought Content: Thought content normal. Thought content does not include homicidal or suicidal ideation. Thought content does not include homicidal or suicidal plan.         Cognition and Memory: Cognition and memory normal.         Judgment: Judgment normal.       There were no vitals taken for this visit.    The patient was seen remotely today via a MyChart Video Visit through [facility].  Unable to obtain vital signs due to nature of remote visit.  Height stated at 67 inches.  Weight stated at 202 pounds.     No Known Allergies    Recent Results (from the past 2016 hour(s))   Comprehensive Metabolic Panel    Collection Time: 09/01/23  9:23 AM    Specimen: Blood   Result Value Ref Range    Glucose 86 65 - 99 mg/dL    BUN 17 6 - 20 mg/dL    Creatinine 1.15 0.76 - 1.27 mg/dL    Sodium 137 136 - 145 mmol/L    Potassium 4.1 3.5 - 5.2 mmol/L    Chloride 101 98 - 107 mmol/L    CO2 26.0 22.0 - 29.0 mmol/L    Calcium 9.7 8.6 - 10.5 mg/dL    Total Protein 7.1 6.0 - 8.5 g/dL    Albumin 4.7 3.5 - 5.2 g/dL    ALT (SGPT) 14 1 - 41 U/L    AST (SGOT) 19 1 - 40 U/L    Alkaline Phosphatase 81 39 - 117 U/L    Total Bilirubin 0.5 0.0 - 1.2 mg/dL    Globulin 2.4 gm/dL    A/G Ratio 2.0 g/dL    BUN/Creatinine Ratio 14.8 7.0 - 25.0    Anion Gap 10.0 5.0 - 15.0 mmol/L    eGFR 80.0 >60.0 mL/min/1.73   Lipid Panel    Collection Time: 09/01/23  9:23 AM    Specimen: Blood   Result Value Ref Range    Total Cholesterol 110 0 - 200 mg/dL    Triglycerides 46 0 - 150 mg/dL    HDL Cholesterol 52 40 - 60 mg/dL    LDL Cholesterol  47 0 - 100 mg/dL    VLDL Cholesterol 11 5 - 40 mg/dL    LDL/HDL Ratio 0.94    PSA Screen    Collection Time: 09/01/23  9:23 AM    Specimen: Blood   Result Value Ref Range    PSA 0.863 0.000 - 4.000 ng/mL   Hemoglobin A1c    Collection Time: 09/01/23  9:23 AM    Specimen: Blood   Result Value Ref Range    Hemoglobin A1C 5.30 4.80 -  5.60 %   CBC Auto Differential    Collection Time: 09/01/23  9:23 AM    Specimen: Blood   Result Value Ref Range    WBC 6.04 3.40 - 10.80 10*3/mm3    RBC 5.59 4.14 - 5.80 10*6/mm3    Hemoglobin 16.6 13.0 - 17.7 g/dL    Hematocrit 48.1 37.5 - 51.0 %    MCV 86.0 79.0 - 97.0 fL    MCH 29.7 26.6 - 33.0 pg    MCHC 34.5 31.5 - 35.7 g/dL    RDW 12.7 12.3 - 15.4 %    RDW-SD 39.1 37.0 - 54.0 fl    MPV 9.8 6.0 - 12.0 fL    Platelets 202 140 - 450 10*3/mm3    Neutrophil % 59.1 42.7 - 76.0 %    Lymphocyte % 29.8 19.6 - 45.3 %    Monocyte % 7.6 5.0 - 12.0 %    Eosinophil % 2.5 0.3 - 6.2 %    Basophil % 0.7 0.0 - 1.5 %    Immature Grans % 0.3 0.0 - 0.5 %    Neutrophils, Absolute 3.57 1.70 - 7.00 10*3/mm3    Lymphocytes, Absolute 1.80 0.70 - 3.10 10*3/mm3    Monocytes, Absolute 0.46 0.10 - 0.90 10*3/mm3    Eosinophils, Absolute 0.15 0.00 - 0.40 10*3/mm3    Basophils, Absolute 0.04 0.00 - 0.20 10*3/mm3    Immature Grans, Absolute 0.02 0.00 - 0.05 10*3/mm3    nRBC 0.0 0.0 - 0.2 /100 WBC       Current Medications:   Current Outpatient Medications   Medication Sig Dispense Refill    buPROPion XL (WELLBUTRIN XL) 300 MG 24 hr tablet Take 1 tablet by mouth Daily. 90 tablet 0     No current facility-administered medications for this visit.       Mental Status Exam:   Hygiene:   good  Cooperation:   Agitated/irritable, but cooperative  Eye Contact:  Good  Psychomotor Behavior:  Appropriate  Affect:   Flat  Hopelessness: Denies  Speech:  Normal  Thought Process:  Goal directed and Linear  Thought Content:  Normal  Suicidal:  None  Homicidal:  None  Hallucinations:  None  Delusion:  None  Memory:  Intact  Orientation:  Person, Place, Time and Situation  Reliability:  good  Insight:  Good  Judgement:  Good  Impulse Control:  Good  Physical/Medical Issues:  Yes See medical histry          Assessment & Plan   Diagnoses and all orders for this visit:    1. ADHD, adult residual type (Primary)  -     Discontinue: buPROPion XL (WELLBUTRIN XL) 150  MG 24 hr tablet; Take 1 tablet by mouth Daily.  Dispense: 90 tablet; Refill: 0  -     buPROPion XL (WELLBUTRIN XL) 300 MG 24 hr tablet; Take 1 tablet by mouth Daily.  Dispense: 90 tablet; Refill: 0  -     Ambulatory Referral to Psychiatry    2. Moderate episode of recurrent major depressive disorder  -     Discontinue: buPROPion XL (WELLBUTRIN XL) 150 MG 24 hr tablet; Take 1 tablet by mouth Daily.  Dispense: 90 tablet; Refill: 0  -     buPROPion XL (WELLBUTRIN XL) 300 MG 24 hr tablet; Take 1 tablet by mouth Daily.  Dispense: 90 tablet; Refill: 0  -     Ambulatory Referral to Psychiatry    3. Generalized anxiety disorder  -     Discontinue: buPROPion XL (WELLBUTRIN XL) 150 MG 24 hr tablet; Take 1 tablet by mouth Daily.  Dispense: 90 tablet; Refill: 0  -     buPROPion XL (WELLBUTRIN XL) 300 MG 24 hr tablet; Take 1 tablet by mouth Daily.  Dispense: 90 tablet; Refill: 0  -     Ambulatory Referral to Psychiatry    4. Bereavement  -     Ambulatory Referral to Psychiatry              Visit Diagnoses:    ICD-10-CM ICD-9-CM   1. ADHD, adult residual type  F90.8 314.01   2. Moderate episode of recurrent major depressive disorder  F33.1 296.32   3. Generalized anxiety disorder  F41.1 300.02   4. Bereavement  Z63.4 V62.82         GOALS:  Short Term Goals: Patient will be compliant with medication, and patient will have no significant medication related side effects.  Patient will be engaged in psychotherapy as indicated.  Patient will report subjective improvement of symptoms.  Long term goals: To stabilize mood and treat/improve subjective symptoms, the patient will stay out of the hospital, the patient will be at an optimal level of functioning, and the patient will take all medications as prescribed.  The patient verbalized understanding and agreement with goals that were mutually set.      SUICIDE RISK ASSESSMENT: Unalterable demographics and a history of mental health intervention indicate this patient is in a high risk  category compared to the general population. At present, the patient denies active SI/HI, intentions, or plans at this time and agrees to seek immediate care should such thoughts develop. The patient verbalizes understanding of how to access emergency care if needed and agrees to do so. Consideration of suicide risk and protective factors such as history, current presentation, individual strengths and weaknesses, psychosocial and environmental stressors and variables, psychiatric illness and symptoms, medical conditions and pain, took place in this interview. Based on those considerations, the patient is determined: within individual baseline and presenting no imminent risk for suicide or homicide. Other recommendations: The patient does not meet the criteria for inpatient admission and is not a safety risk to self or others at today's visit. Inpatient treatment offers no significant advantages over outpatient treatment for this patient at today's visit.      SAFETY PLAN:  Patient was given ample time for questions and fully participated in treatment planning.  Patient was encouraged to call the clinic with any questions or concerns.  Patient was informed of access to emergency care. If patient were to develop any significant symptomatology, suicidal ideation, homicidal ideation, any concerns, or feel unsafe at any time they are to call the clinic and if unable to get immediate assistance should immediately call 911 or go to the nearest emergency room.  The patient is advised to remove or secure (lock away) all lethal weapons (including guns) and sharps (including razors, scissors, knives, etc.).  All medications (including any prescribed and any over the counter medications) should be stored in a safe and secured location that is not obtainable by children/adolescents.  Patient was given an opportunity and encouraged to ask questions about their medication, illness, and treatment. Patient contracted verbally for  the following: If you are experiencing an emotional crisis or have thoughts of harming yourself or others, please go to your nearest local emergency room or call 911. Will continue to re-assess medication response and side effects frequently to establish efficacy and ensure safety. Risks, any black box warnings, side effects, off label usage, and benefits of medication and treatment discussed with patient, along with potential adverse side effects of current and/or newly prescribed medication, alternative treatment options, and OTC medications.  Patient verbalized understanding of potential risks, any off label use of medication, any black box warnings, and any side effects in their own words. The patient verbalized understanding and agreed to comply with the safety plan discussed in their own words.  Patient given the number to the office. Number also available to the 24- hour suicide hotline.       TREATMENT PLAN/GOALS: Continue medications and treatment plan as indicated. Treatment and medication options discussed during today's visit. Patient acknowledged and verbally consented to continue with current treatment plan and was educated on the importance of compliance with treatment and follow-up appointments.        -Continue Wellbutrin  mg twice daily for ADHD/depression/anxiety.  The patient reports that the Wellbutrin  mg twice daily did not seem to be as effective as it was initially in managing his symptoms, and that it did not seem to be lasting throughout his day.  He reports that he switched to the Wellbutrin  mg daily for a previous prescription of this has seemed to be much more effective in managing his symptoms and also lasts throughout the day.  He endorses that he would like to continue Wellbutrin XL at 300 mg daily at this time it has been more effective and well-tolerated.  -Begin psychotherapy per patient's request.  Referral placed to begin therapy with the [facility] at today's encounter.          MEDICATION ISSUES: Discussed medication options and treatment plan of prescribed medication, any off label use of medication, as well as the risks, benefits, any black box warnings including increased suicidality, and side effects including but not limited to potential falls, dizziness, possible impaired driving, GI side effects (change in appetite, abdominal discomfort, nausea, vomiting, diarrhea, and/or constipation), dry mouth, somnolence, sedation, insomnia, activation, agitation, irritation, tremors, abnormal muscle movements or disorders, headache, sweating, possible bruising or rare bleeding, electrolyte and/or fluid abnormalities, change in blood pressure/heart rate/and or heart rhythm, sexual dysfunction, and metabolic adversities among others. Patient and/or guardian agreeable to call the office with any worsening of symptoms or onset of side effects, or if any concerns or questions arise.  The contact information for the office is made available to the patient and/or guardian.  Patient and/or guardian agreeable to call 911 or go to the nearest ER should they begin having any SI/HI, or if any urgent concerns arise. No medication side effects or related complaints today.            VERBAL INFORMED CONSENT FOR MEDICATION:  The patient was educated that their proposed/prescribed psychotropic medication(s) has potential risks, side effects, adverse effects, and black box warnings; and these have been discussed with the patient.  The patient has been informed that their treatment and medication dosage is to be individualized, and may even be above or below the recommended range/dosage due to patient individualization and response, but medication is prescribed using a shared decision making approach, and no medication or dosage will be prescribed without the patient's verbal consent.  The reason for the use of the medication including any off label use and  alternative modes of treatment other than or in addition to medication has been considered and discussed, the probable consequences of not receiving the proposed treatment have been discussed, and any treatment side effects, black box warnings, and cautions associated with treatment have been discussed with the patient.  The patient is allowed ample time to openly discuss and ask questions regarding the proposed medication(s) and treatment plan and the patient verbalizes understanding the reasons for the use of the medication, its potential risks and benefits, other alternative treatment(s), and the probable consequences that may occur if the proposed medication is not given.  The patient has been given ample time to ask questions and study the information and find the information to be specific, accurate, and complete.  The patient gives verbal consent for the medication(s) proposed/prescribed, they verbalized understanding that they can refuse and withdraw consent at any time with the assistance of this APRN, and the patient has verbally confirmed that they are aware, and are willing, to take the prescribed medication and follow the treatment plan with the known possible risks, side effect, black box warnings, and any potential medication interactions, and the patient reports they will be worse off without this medication and treatment plan.  The patient is advised to contact this APRN/this office if any questions or concerns arise at any time (at [phone]), or call 911/go to the closest emergency department if needed or outside of office hours.          [facility] No Show Policy:  We understand unexpected circumstances arise; however, anytime you miss your appointment we are unable to provide you appropriate care.  In addition, each appointment missed could have been used to provide care for others.  We ask that you call at least 24 hours in advance to cancel or reschedule an  appointment.  We would like to take this opportunity to remind you of our policy stating patients who miss THREE or more appointments without cancelling or rescheduling 24 hours in advance of the appointment may be subject to cancellation of any further visits with our clinic and recommendation to seek in-person services/visits.    Please call [phone] to reschedule your appointment. If there are reasons that make it difficult for you to keep the appointments, please call and let us know how we can help.  Please understand that medication prescribing will not continue without seeing your provider.      [facility]'s No Show Policy reviewed with patient at today's visit. Patient verbalized understanding of this policy. Discussed with patient that in the event that there are three or more no show visits, it will be recommended that they pursue in-person services/visits as noncompliance with telehealth visits indicates that patient is not an appropriate candidate for telemedicine and would likely be more appropriate for in-person services/visits. Patient verbalizes understanding and is agreeable to this.           MEDS ORDERED DURING VISIT:  New Medications Ordered This Visit   Medications    buPROPion XL (WELLBUTRIN XL) 300 MG 24 hr tablet     Sig: Take 1 tablet by mouth Daily.     Dispense:  90 tablet     Refill:  0       Return in about 3 months (around 1/16/2024), or if symptoms worsen or fail to improve, for Recheck.        Patient will follow-up in 3 months, highly encouraged the patient if he had any questions or concerns to contact the behavioral health virtual clinic for sooner appointment patient verbalized understanding.      Functional Status: Moderate impairment     Prognosis: Fair with Ongoing Treatment             This document has been electronically signed by ELMO Last  October 16, 2023 08:28 EDT      Some of the data in this electronic note has been brought  forward from a previous encounter, any necessary changes have been made, it has been reviewed by this APRN, and it is accurate.       Part of this note may be an electronic transcription/translation of spoken language to printed text using the Dragon Dictation System.

## 2023-10-23 ENCOUNTER — TELEPHONE (OUTPATIENT)
Dept: PSYCHIATRY | Facility: CLINIC | Age: 43
End: 2023-10-23
Payer: COMMERCIAL

## 2023-10-23 NOTE — TELEPHONE ENCOUNTER
Offered to schedule pt with our virtual clinic. Pt requests in person services in his area. Left pt VM stating to contact the number on insurance card for psychotherapy offices in his area. Advised pt to call back to our office if he has any issues with this or issues with scheduling.

## 2024-01-11 DIAGNOSIS — F41.1 GENERALIZED ANXIETY DISORDER: Chronic | ICD-10-CM

## 2024-01-11 DIAGNOSIS — F33.1 MODERATE EPISODE OF RECURRENT MAJOR DEPRESSIVE DISORDER: Chronic | ICD-10-CM

## 2024-01-11 DIAGNOSIS — F90.8 ADHD, ADULT RESIDUAL TYPE: Chronic | ICD-10-CM

## 2024-01-11 RX ORDER — BUPROPION HYDROCHLORIDE 300 MG/1
300 TABLET ORAL DAILY
Qty: 90 TABLET | Refills: 0 | Status: SHIPPED | OUTPATIENT
Start: 2024-01-11

## 2024-01-23 ENCOUNTER — TELEMEDICINE (OUTPATIENT)
Dept: PSYCHIATRY | Facility: CLINIC | Age: 44
End: 2024-01-23
Payer: COMMERCIAL

## 2024-01-23 DIAGNOSIS — F90.8 ADHD, ADULT RESIDUAL TYPE: Chronic | ICD-10-CM

## 2024-01-23 DIAGNOSIS — F33.1 MODERATE EPISODE OF RECURRENT MAJOR DEPRESSIVE DISORDER: Primary | Chronic | ICD-10-CM

## 2024-01-23 DIAGNOSIS — F41.1 GENERALIZED ANXIETY DISORDER: Chronic | ICD-10-CM

## 2024-01-23 PROCEDURE — 99214 OFFICE O/P EST MOD 30 MIN: CPT | Performed by: NURSE PRACTITIONER

## 2024-01-23 PROCEDURE — 90833 PSYTX W PT W E/M 30 MIN: CPT | Performed by: NURSE PRACTITIONER

## 2024-01-23 RX ORDER — VILAZODONE HYDROCHLORIDE 10 MG/1
10 TABLET ORAL DAILY
Qty: 30 TABLET | Refills: 0 | Status: SHIPPED | OUTPATIENT
Start: 2024-01-23

## 2024-01-23 NOTE — PROGRESS NOTES
This provider is located at the [facility] (through [facility] [place] [facility]), [address], [place], [address] using a secure Service [employer] Video Visit through [email or web address]. Patient is being seen remotely via telehealth at their home address in [place], and stated they are in a secure environment for this session. The patient's condition being diagnosed/treated is appropriate for telemedicine. The provider identified herself as well as her credentials. The patient, and/or patients guardian, consent to be seen remotely, and when consent is given they understand that the consent allows for patient identifiable information to be sent to a third party as needed. They may refuse to be seen remotely at any time. The electronic data is encrypted and password protected, and the patient and/or guardian has been advised of the potential risks to privacy not withstanding such measures.    You have chosen to receive care through a telehealth visit.  Do you consent to use a video/audio connection for your medical care today? Yes     Patient identifiers utilized: Name and date of birth.    Patient verbally confirmed consent for today's encounter  01/23/2024 .    The patient does verbally confirm they are being seen today while physically located in the [facility].  This provider/this APRN is licensed in the [facility] where the patient is located/being seen.           Subjective   Robert Feliciano is a 43 y.o. male who is here today for medication management follow up.    Chief Complaint: Depression, anxiety, ADHD    History of Present Illness: The patient reports that overall he has been having a difficult time over the past few months.  He states that he has not been able to begin therapy/counseling yet due to difficulty finding a provider who accepts his insurance, but states that he does have an upcoming appointment this Thursday to establish in therapy/counseling.  He states that he is  "hopeful that they are able to develop positive, therapeutic relationship and he maintain his attendance in therapy to help him process through the grief since losing his father as well as other life stressors.  He states that being with his mother in the home where his father lived is very difficult for him.  He states that if he goes there it often takes him up to 2-3 days to recover from this and that during those days he feels as if he \"just shuts down\".  He states that he is hoping to address his grief through therapy and learn how to best cope with this.  He explains other recent life stressors that he has been dealing with and how they have impacted his mental health.  He states for example that he is looking to switch positions with his current employer and endorses that this has been stressful for him.  He states that he also worries about learning a new position and it's roles/responsibilities as he does not feel he can think as clearly and critically as he normally does since his anxiety has been increased.  He states that he can focus well at work on tasks that he already knows how to do and does not have to think about, but states anything that requires him to think more critically is difficult for him at this time.  He endorses finding himself becoming irritable/agitated and overwhelmed whenever he has to do a task that requires him to think about it.  He states that he will often think several steps ahead and has difficulty completing tasks as he normally would due to not being being to take things step by step.  He states that this causes him a lot of worry with potentially starting a new position and endorses he questions whether he will be competent.  The patient describes recent symptoms of significant anxiety.  He states that he often feels agitated and that this will occur frequently throughout the day.  He endorses that he often feels as if the level of irritability/agitation that he experiences " "is out of proportion to the trigger/stressor.  He states for example that after he becomes irritable/agitated and looks back he realizes that his response probably was not warranted.  He also states feeling very critical of himself and having internal dialogue with self-doubt and questioning himself.  He states that the Wellbutrin XL does seem to be helping with his as he endorses that he tried decreasing the dose to 150 mg daily and he felt more agitated/irritable and that it improved with going back to 300 mg daily and feeling more calm with this dose, but states that even with the benefit he is receiving from the Wellbutrin XL he does not feel as if the symptoms are well controlled currently.  He states that he is trying to do things to help manage his stress levels and anxiety, such as exercising more routinely.  He states that while he thinks the nonpharmacologic behavioral interventions and coping mechanisms he is utilizing are helping to manage his stress levels and anxiety, he endorses that the effort required to keep his symptoms managed has been overwhelming and he questions whether he can maintain this.  He endorses that overall he just feels \"flat\".  He states that his mood seems to be more down and that depressive symptoms seem to be increased recently, which he endorses he feels has been in part due to grieving and also due to his recent anxiety levels.  He states that he would be interested in discussing potential pharmacologic treatment/medication options to help better address his symptoms as he feels that a solely nonpharmacologic approach is not entirely effective at this time.  The patient reports his appetite as good.  The patient reports his sleep as good.  The patient denies any nightmares or bad dreams.  The patient denies any new medical problems or changes in medications since last appointment with this facility.  The patient reports compliance with current medication regimen.  The patient " denies any current side effects from his current medication regimen.  The patient denies any abnormal muscle movements or tics.  The patient denies suicidal ideations and homicidal ideations, and is convincing.  The patient denies any auditory hallucinations or visual hallucinations.  The patient does not endorse significant symptoms consistent with bipolar disorder or a psychotic illness.            Prior Psychiatric Medications:  Lamictal 200 mg daily - the patient states that he was started on this medication in 2019 and discontinued it approximately 2-3 weeks ago.  The patient states that he was started on this medication by his PMHNP after she diagnosed him with bipolar II disorder.  The patient states that he felt like his mood was better when he was taking this medication, but states that he did not like that the medication made him feel more fatigued.   Strattera 10 mg daily - caused sexual side effects   Clonidine 0.1 mg nightly as needed for insomnia/anxiety - caused hypotension   Zoloft - ineffective/caused SI  Wellbutrin  mg twice daily -initially effective for depression/anxiety/ADHD, but later seems to have lost efficacy        The following portions of the patient's history were reviewed and updated as appropriate: allergies, current medications, past family history, past medical history, past social history, past surgical history and problem list.    Review of Systems   Constitutional:  Positive for activity change. Negative for appetite change, fatigue and unexpected weight change.   Psychiatric/Behavioral:  Positive for agitation, decreased concentration and dysphoric mood. Negative for behavioral problems, confusion, hallucinations, self-injury, sleep disturbance and suicidal ideas. The patient is nervous/anxious. The patient is not hyperactive.        Objective   Physical Exam  Constitutional:       Appearance: Normal appearance.   Neurological:      Mental Status: He is alert.    Psychiatric:         Attention and Perception: Attention and perception normal.         Mood and Affect: Mood is anxious and depressed. Affect is flat.         Speech: Speech normal.         Behavior: Behavior is agitated. Behavior is cooperative.         Thought Content: Thought content normal. Thought content does not include homicidal or suicidal ideation. Thought content does not include homicidal or suicidal plan.         Cognition and Memory: Cognition and memory normal.         Judgment: Judgment normal.       There were no vitals taken for this visit.    The patient was seen remotely today via a MyChart Video Visit through [facility].  Unable to obtain vital signs due to nature of remote visit.  Height stated at 67 inches.  Weight stated at 202 pounds.     No Known Allergies    No results found for this or any previous visit (from the past 2016 hour(s)).      Current Medications:   Current Outpatient Medications   Medication Sig Dispense Refill    buPROPion XL (WELLBUTRIN XL) 300 MG 24 hr tablet TAKE 1 TABLET BY MOUTH DAILY 90 tablet 0    vilazodone (VIIBRYD) 10 MG tablet tablet Take 1 tablet by mouth Daily. 30 tablet 0     No current facility-administered medications for this visit.       Mental Status Exam:   Hygiene:   good  Cooperation:   Agitated/irritable, but cooperative  Eye Contact:  Good  Psychomotor Behavior:  Appropriate  Affect:   Flat  Hopelessness: Denies  Speech:  Normal  Thought Process:  Goal directed and Linear  Thought Content:  Normal  Suicidal:  None  Homicidal:  None  Hallucinations:  None  Delusion:  None  Memory:  Intact  Orientation:  Person, Place, Time and Situation  Reliability:  good  Insight:  Good  Judgement:  Good  Impulse Control:  Good  Physical/Medical Issues:  Yes See medical histry        PHQ-9 Depression Screening  Little interest or pleasure in doing things? (P) 2-->more than half the days   Feeling down, depressed, or hopeless? (P) 2-->more than half the days   Trouble  falling or staying asleep, or sleeping too much? (P) 1-->several days   Feeling tired or having little energy? (P) 2-->more than half the days   Poor appetite or overeating? (P) 2-->more than half the days   Feeling bad about yourself - or that you are a failure or have let yourself or your family down? (P) 1-->several days   Trouble concentrating on things, such as reading the newspaper or watching television? (P) 1-->several days   Moving or speaking so slowly that other people could have noticed? Or the opposite - being so fidgety or restless that you have been moving around a lot more than usual? (P) 0-->not at all   Thoughts that you would be better off dead, or of hurting yourself in some way? (P) 0-->not at all   PHQ-9 Total Score (P) 11   If you checked off any problems, how difficult have these problems made it for you to do your work, take care of things at home, or get along with other people? (P) somewhat difficult       RADHA-7  Feeling nervous, anxious or on edge: (P) Several days  Not being able to stop or control worrying: (P) Several days  Worrying too much about different things: (P) Several days  Trouble Relaxing: (P) Several days  Being so restless that it is hard to sit still: (P) Several days  Feeling afraid as if something awful might happen: (P) Not at all  Becoming easily annoyed or irritable: (P) Nearly every day  RADHA 7 Total Score: (P) 8  If you checked any problems, how difficult have these problems made it for you to do your work, take care of things at home, or get along with other people: (P) Somewhat difficult        Assessment & Plan   Diagnoses and all orders for this visit:    1. Moderate episode of recurrent major depressive disorder (Primary)  -     vilazodone (VIIBRYD) 10 MG tablet tablet; Take 1 tablet by mouth Daily.  Dispense: 30 tablet; Refill: 0    2. Generalized anxiety disorder  -     vilazodone (VIIBRYD) 10 MG tablet tablet; Take 1 tablet by mouth Daily.  Dispense: 30  tablet; Refill: 0    3. ADHD, adult residual type            In/Start Time: 8:12 AM EST.  Out/Stop Time: 8:28 AM EST.  16 minutes of face to face direct patient care with patient was spent for supportive psychotherapy including promoting the therapeutic alliance, strengthening self awareness and insights, strengthening coping skills, discussing relaxation techniques, counseling the patient regarding diagnoses, and utilizing cognitive behavioral therapy to assist the patient in recognizing more appropriate coping mechanisms which are proven effective in reducing the severity of frequency of symptoms.  This APRN assisted the patient in processing the patient's diagnoses including depression and anxiety, and also acknowledged and normalized the patient's thoughts, feelings, and concerns.  This APRN allowed the patient to freely discuss issues without interruption or judgment.  This APRN answered any questions the patient had regarding medication and treatment plan.          Visit Diagnoses:    ICD-10-CM ICD-9-CM   1. Moderate episode of recurrent major depressive disorder  F33.1 296.32   2. Generalized anxiety disorder  F41.1 300.02   3. ADHD, adult residual type  F90.8 314.01           GOALS:  Short Term Goals: Patient will be compliant with medication, and patient will have no significant medication related side effects.  Patient will be engaged in psychotherapy as indicated.  Patient will report subjective improvement of symptoms.  Long term goals: To stabilize mood and treat/improve subjective symptoms, the patient will stay out of the hospital, the patient will be at an optimal level of functioning, and the patient will take all medications as prescribed.  The patient verbalized understanding and agreement with goals that were mutually set.      SUICIDE RISK ASSESSMENT: Unalterable demographics and a history of mental health intervention indicate this patient is in a high risk category compared to the general  population. At present, the patient denies active SI/HI, intentions, or plans at this time and agrees to seek immediate care should such thoughts develop. The patient verbalizes understanding of how to access emergency care if needed and agrees to do so. Consideration of suicide risk and protective factors such as history, current presentation, individual strengths and weaknesses, psychosocial and environmental stressors and variables, psychiatric illness and symptoms, medical conditions and pain, took place in this interview. Based on those considerations, the patient is determined: within individual baseline and presenting no imminent risk for suicide or homicide. Other recommendations: The patient does not meet the criteria for inpatient admission and is not a safety risk to self or others at today's visit. Inpatient treatment offers no significant advantages over outpatient treatment for this patient at today's visit.      SAFETY PLAN:  Patient was given ample time for questions and fully participated in treatment planning.  Patient was encouraged to call the clinic with any questions or concerns.  Patient was informed of access to emergency care. If patient were to develop any significant symptomatology, suicidal ideation, homicidal ideation, any concerns, or feel unsafe at any time they are to call the clinic and if unable to get immediate assistance should immediately call 911 or go to the nearest emergency room.  The patient is advised to remove or secure (lock away) all lethal weapons (including guns) and sharps (including razors, scissors, knives, etc.).  All medications (including any prescribed and any over the counter medications) should be stored in a safe and secured location that is not obtainable by children/adolescents.  Patient was given an opportunity and encouraged to ask questions about their medication, illness, and treatment. Patient contracted verbally for the following: If you are  experiencing an emotional crisis or have thoughts of harming yourself or others, please go to your nearest local emergency room or call 911. Will continue to re-assess medication response and side effects frequently to establish efficacy and ensure safety. Risks, any black box warnings, side effects, off label usage, and benefits of medication and treatment discussed with patient, along with potential adverse side effects of current and/or newly prescribed medication, alternative treatment options, and OTC medications.  Patient verbalized understanding of potential risks, any off label use of medication, any black box warnings, and any side effects in their own words. The patient verbalized understanding and agreed to comply with the safety plan discussed in their own words.  Patient given the number to the office. Number also available to the 24- hour suicide hotline.       TREATMENT PLAN/GOALS: Continue medications and treatment plan as indicated. Treatment and medication options discussed during today's visit. Patient acknowledged and verbally consented to continue with current treatment plan and was educated on the importance of compliance with treatment and follow-up appointments.        -Begin Viibryd 10 mg daily for depression/anxiety   -Continue Wellbutrin  mg twice daily for ADHD and as adjunct for depression/anxiety        MEDICATION ISSUES: Discussed medication options and treatment plan of prescribed medication, any off label use of medication, as well as the risks, benefits, any black box warnings including increased suicidality, and side effects including but not limited to potential falls, dizziness, possible impaired driving, GI side effects (change in appetite, abdominal discomfort, nausea, vomiting, diarrhea, and/or constipation), dry mouth, somnolence, sedation, insomnia, activation, agitation, irritation, tremors, abnormal muscle movements or disorders, headache, sweating, possible bruising  or rare bleeding, electrolyte and/or fluid abnormalities, change in blood pressure/heart rate/and or heart rhythm, sexual dysfunction, and metabolic adversities among others. Patient and/or guardian agreeable to call the office with any worsening of symptoms or onset of side effects, or if any concerns or questions arise.  The contact information for the office is made available to the patient and/or guardian.  Patient and/or guardian agreeable to call 911 or go to the nearest ER should they begin having any SI/HI, or if any urgent concerns arise. No medication side effects or related complaints today.            VERBAL INFORMED CONSENT FOR MEDICATION:  The patient was educated that their proposed/prescribed psychotropic medication(s) has potential risks, side effects, adverse effects, and black box warnings; and these have been discussed with the patient.  The patient has been informed that their treatment and medication dosage is to be individualized, and may even be above or below the recommended range/dosage due to patient individualization and response, but medication is prescribed using a shared decision making approach, and no medication or dosage will be prescribed without the patient's verbal consent.  The reason for the use of the medication including any off label use and alternative modes of treatment other than or in addition to medication has been considered and discussed, the probable consequences of not receiving the proposed treatment have been discussed, and any treatment side effects, black box warnings, and cautions associated with treatment have been discussed with the patient.  The patient is allowed ample time to openly discuss and ask questions regarding the proposed medication(s) and treatment plan and the patient verbalizes understanding the reasons for the use of the medication, its potential risks and benefits, other alternative treatment(s), and the probable consequences that may occur  if the proposed medication is not given.  The patient has been given ample time to ask questions and study the information and find the information to be specific, accurate, and complete.  The patient gives verbal consent for the medication(s) proposed/prescribed, they verbalized understanding that they can refuse and withdraw consent at any time with the assistance of this APRN, and the patient has verbally confirmed that they are aware, and are willing, to take the prescribed medication and follow the treatment plan with the known possible risks, side effect, black box warnings, and any potential medication interactions, and the patient reports they will be worse off without this medication and treatment plan.  The patient is advised to contact this APRN/this office if any questions or concerns arise at any time (at [phone]), or call 911/go to the closest emergency department if needed or outside of office hours.          [facility] No Show Policy:  We understand unexpected circumstances arise; however, anytime you miss your appointment we are unable to provide you appropriate care.  In addition, each appointment missed could have been used to provide care for others.  We ask that you call at least 24 hours in advance to cancel or reschedule an appointment.  We would like to take this opportunity to remind you of our policy stating patients who miss THREE or more appointments without cancelling or rescheduling 24 hours in advance of the appointment may be subject to cancellation of any further visits with our clinic and recommendation to seek in-person services/visits.    Please call [phone] to reschedule your appointment. If there are reasons that make it difficult for you to keep the appointments, please call and let us know how we can help.  Please understand that medication prescribing will not continue without seeing your provider.      [facility]'s No  Show Policy reviewed with patient at today's visit. Patient verbalized understanding of this policy. Discussed with patient that in the event that there are three or more no show visits, it will be recommended that they pursue in-person services/visits as noncompliance with telehealth visits indicates that patient is not an appropriate candidate for telemedicine and would likely be more appropriate for in-person services/visits. Patient verbalizes understanding and is agreeable to this.           MEDS ORDERED DURING VISIT:  New Medications Ordered This Visit   Medications    vilazodone (VIIBRYD) 10 MG tablet tablet     Sig: Take 1 tablet by mouth Daily.     Dispense:  30 tablet     Refill:  0       Return in about 4 weeks (around 2/20/2024), or if symptoms worsen or fail to improve, for Recheck.        Patient will follow-up in 4 weeks, highly encouraged the patient if he had any questions or concerns to contact the behavioral health [facility] clinic for sooner appointment patient verbalized understanding.      Functional Status: Moderate impairment     Prognosis: Fair with Ongoing Treatment             This document has been electronically signed by ELMO Last  January 23, 2024 08:45 EST      Some of the data in this electronic note has been brought forward from a previous encounter, any necessary changes have been made, it has been reviewed by this APRN, and it is accurate.       Part of this note may be an electronic transcription/translation of spoken language to printed text using the Dragon Dictation System.

## 2024-02-19 DIAGNOSIS — F33.1 MODERATE EPISODE OF RECURRENT MAJOR DEPRESSIVE DISORDER: Chronic | ICD-10-CM

## 2024-02-19 DIAGNOSIS — F41.1 GENERALIZED ANXIETY DISORDER: Chronic | ICD-10-CM

## 2024-02-19 RX ORDER — VILAZODONE HYDROCHLORIDE 10 MG/1
10 TABLET ORAL DAILY
Qty: 30 TABLET | Refills: 0 | Status: SHIPPED | OUTPATIENT
Start: 2024-02-19

## 2024-03-07 ENCOUNTER — PATIENT MESSAGE (OUTPATIENT)
Dept: FAMILY MEDICINE CLINIC | Facility: CLINIC | Age: 44
End: 2024-03-07
Payer: COMMERCIAL

## 2024-03-19 DIAGNOSIS — F41.1 GENERALIZED ANXIETY DISORDER: Chronic | ICD-10-CM

## 2024-03-19 DIAGNOSIS — F33.1 MODERATE EPISODE OF RECURRENT MAJOR DEPRESSIVE DISORDER: Chronic | ICD-10-CM

## 2024-03-19 RX ORDER — VILAZODONE HYDROCHLORIDE 10 MG/1
10 TABLET ORAL DAILY
Qty: 30 TABLET | Refills: 0 | Status: SHIPPED | OUTPATIENT
Start: 2024-03-19

## 2024-04-02 ENCOUNTER — OFFICE VISIT (OUTPATIENT)
Dept: CARDIOLOGY | Facility: HOSPITAL | Age: 44
End: 2024-04-02
Payer: COMMERCIAL

## 2024-04-02 ENCOUNTER — HOSPITAL ENCOUNTER (OUTPATIENT)
Dept: CARDIOLOGY | Facility: HOSPITAL | Age: 44
Discharge: HOME | End: 2024-04-02
Payer: COMMERCIAL

## 2024-04-02 VITALS
HEIGHT: 67 IN | WEIGHT: 210.8 LBS | TEMPERATURE: 97 F | SYSTOLIC BLOOD PRESSURE: 120 MMHG | RESPIRATION RATE: 18 BRPM | OXYGEN SATURATION: 98 % | HEART RATE: 66 BPM | DIASTOLIC BLOOD PRESSURE: 78 MMHG | BODY MASS INDEX: 33.09 KG/M2

## 2024-04-02 DIAGNOSIS — R07.9 CHEST PAIN, UNSPECIFIED TYPE: ICD-10-CM

## 2024-04-02 DIAGNOSIS — R07.9 CHEST PAIN, UNSPECIFIED TYPE: Primary | ICD-10-CM

## 2024-04-02 LAB
QT INTERVAL: 382 MS
QTC INTERVAL: 390 MS

## 2024-04-02 PROCEDURE — 93005 ELECTROCARDIOGRAM TRACING: CPT | Performed by: NURSE PRACTITIONER

## 2024-04-02 NOTE — PROGRESS NOTES
"[facility]  [facility]    Chief Complaint  Chest Pain    Subjective    History of Present Illness {CC  Problem List  Visit  Diagnosis   Encounters  Notes  Medications  Labs  Result Review Imaging  Media :23}     Robert Feliciano is a 43 y.o. male with bipolar disorder who presents today for evaluation of chest pain at the request of .    He reports intermittent chest pain for about 6 months. Describes it as a sharp pain for a few seconds. He does walk on a treadmill and notes some PALAFOX but this is on a 10 degree incline. No exertional chest pain. 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             Objective     Vital Signs:   Vitals:    04/02/24 1441 04/02/24 1442   BP: 118/78 120/78   BP Location: Left arm Left arm   Patient Position: Standing Sitting   Cuff Size: Adult Adult   Pulse: 68 66   Resp:  18   Temp: 97 °F (36.1 °C) 97 °F (36.1 °C)   TempSrc: Temporal Temporal   SpO2: 97% 98%   Weight:  95.6 kg (210 lb 12.8 oz)   Height:  170.2 cm (67\")     Body mass index is 33.02 kg/m².  Physical Exam  Vitals reviewed.   Constitutional:       Appearance: Normal appearance.   HENT:      Head: Normocephalic.   Neck:      Vascular: No carotid bruit.   Cardiovascular:      Rate and Rhythm: Normal rate and regular rhythm.      Pulses: Normal pulses.      Heart sounds: Normal heart sounds, S1 normal and S2 normal. No murmur heard.  Pulmonary:      Effort: Pulmonary effort is normal. No respiratory distress.      Breath sounds: Normal breath sounds.   Chest:      Chest wall: No tenderness.   Abdominal:      General: Abdomen is flat.      Palpations: Abdomen is soft.   Musculoskeletal:      Cervical back: Neck supple.      Right lower leg: No edema.      Left lower leg: No edema.   Skin:     General: Skin is warm and dry.   Neurological:      General: No focal deficit present.      Mental Status: He is alert and oriented to person, place, and time. " Mental status is at baseline.   Psychiatric:         Mood and Affect: Mood normal.         Behavior: Behavior normal.         Thought Content: Thought content normal.              Result Review  Data Reviewed:{ Labs  Result Review  Imaging  Med Tab  Media :23}   PSA Screen (09/01/2023 09:23)  Lipid Panel (09/01/2023 09:23)  CBC & Differential (09/01/2023 09:23)  Comprehensive Metabolic Panel (09/01/2023 09:23)  Cologuard - Stool, Per Rectum (09/02/2023 02:01)  Hemoglobin A1c (09/01/2023 09:23)           Assessment and Plan {CC Problem List  Visit Diagnosis  ROS  Review (Popup)  Health Maintenance  Quality  BestPractice  Medications  SmartSets  SnapShot Encounters  Media :23}   1. Chest pain, unspecified type  - EKG today normal. Atypical for cardiac etiology. He is quite active without exertional symptoms.  We discussed signs symptoms of angina today and he agrees he is not having any.  We discussed cardiac prevention including plant-based/Mediterranean diet, exercise, weight loss.  We discussed signs symptoms of angina and reasons to call.  - ECG 12 Lead; Future      I spent 28 minutes caring for Robert on this date of service. This time includes time spent by me in the following activities:preparing for the visit, reviewing tests, obtaining and/or reviewing a separately obtained history, performing a medically appropriate examination and/or evaluation , counseling and educating the patient/family/caregiver, and documenting information in the medical record    Follow Up {Instructions Charge Capture  Follow-up Communications :23}   Return if symptoms worsen or fail to improve.    Patient was given instructions and counseling regarding his condition or for health maintenance advice. Please see specific information pulled into the AVS if appropriate.  Advised to call the [facility] Center with any questions, concerns, or worsening symptoms.

## 2024-05-13 DIAGNOSIS — F33.1 MODERATE EPISODE OF RECURRENT MAJOR DEPRESSIVE DISORDER: Chronic | ICD-10-CM

## 2024-05-13 DIAGNOSIS — F90.8 ADHD, ADULT RESIDUAL TYPE: Chronic | ICD-10-CM

## 2024-05-13 DIAGNOSIS — F41.1 GENERALIZED ANXIETY DISORDER: Chronic | ICD-10-CM

## 2024-05-13 RX ORDER — BUPROPION HYDROCHLORIDE 300 MG/1
300 TABLET ORAL DAILY
Qty: 90 TABLET | Refills: 0 | Status: SHIPPED | OUTPATIENT
Start: 2024-05-13

## 2024-05-13 RX ORDER — VILAZODONE HYDROCHLORIDE 10 MG/1
10 TABLET ORAL DAILY
Qty: 30 TABLET | Refills: 0 | Status: SHIPPED | OUTPATIENT
Start: 2024-05-13

## 2024-05-30 ENCOUNTER — TELEMEDICINE (OUTPATIENT)
Dept: PSYCHIATRY | Facility: CLINIC | Age: 44
End: 2024-05-30
Payer: COMMERCIAL

## 2024-05-30 DIAGNOSIS — F41.1 GENERALIZED ANXIETY DISORDER: Chronic | ICD-10-CM

## 2024-05-30 DIAGNOSIS — F33.1 MODERATE EPISODE OF RECURRENT MAJOR DEPRESSIVE DISORDER: Primary | Chronic | ICD-10-CM

## 2024-05-30 DIAGNOSIS — F90.8 ADHD, ADULT RESIDUAL TYPE: Chronic | ICD-10-CM

## 2024-05-30 PROCEDURE — 99214 OFFICE O/P EST MOD 30 MIN: CPT | Performed by: NURSE PRACTITIONER

## 2024-05-30 RX ORDER — VILAZODONE HYDROCHLORIDE 10 MG/1
10 TABLET ORAL DAILY
Qty: 30 TABLET | Refills: 0 | Status: SHIPPED | OUTPATIENT
Start: 2024-05-30

## 2024-05-30 NOTE — PROGRESS NOTES
This provider is located at the [facility] (through [facility]), [address], [place], [address] using a secure [employer] Video Visit through [employer]. Patient is being seen remotely via telehealth at their home address in [place], and stated they are in a secure environment for this session. The patient's condition being diagnosed/treated is appropriate for telemedicine. The provider identified herself as well as her credentials. The patient, and/or patients guardian, consent to be seen remotely, and when consent is given they understand that the consent allows for patient identifiable information to be sent to a third party as needed. They may refuse to be seen remotely at any time. The electronic data is encrypted and password protected, and the patient and/or guardian has been advised of the potential risks to privacy not withstanding such measures.    You have chosen to receive care through a telehealth visit.  Do you consent to use a video/audio connection for your medical care today? Yes     Patient identifiers utilized: Name and date of birth.    Patient verbally confirmed consent for today's encounter  05/30/2024 .    The patient does verbally confirm they are being seen today while physically located in the [facility].  This provider/this APRN is licensed in the [facility] where the patient is located/being seen.           Subjective   Robert Feliciano is a 43 y.o. male who is here today for medication management follow up.    Chief Complaint: Depression, anxiety, ADHD    History of Present Illness:   -Since last encounter with this APRN/Office: The patient reports that overall things have been going pretty good for him over the past few months.  He states that the winter and spring were difficult for him, but states that things have been improving recently.  He states that his father passed away in September and that his mother almost passed away  "in March.  He states that his mother had a UTI and that caused her to become septic.  He states that she was discharged from the hospital to a rehab facility and states that she was discharged approximately 1.5 weeks ago.  He states that seeing how close his mother was to death and worrying about her health caused him a lot of anxiety, but states that recently as her health status has improved he has noticed his anxiety improving as well.  He states that he has been doing talk therapy recently and has had approximately 3-4 sessions.  He states that this has been helpful for him and that he hopes this will also help to improve his anxiety.  He states that he recently switched shifts at work.  He states that he is now working from 4 AM to 2 PM.  He states that it has been an adjustment for him switching to new schedule, but states that overall it has been going well.  He states that he is still trying to go to the gym.  He states that with starting the new work schedule he has only been able to go approximately twice per week, but endorses that he is hoping to increase this soon.  -Mood reported as: \"okay\"    -Changes in medications or new medical problems/concerns since last visit: Denies  -Reported medication compliance: The patient reports compliance with their current psychotropic medication regimen.    -Reported medication side effects or concerns: Reports that initially he experienced some insomnia after beginning the Viibryd, but states that this improved after continue management medication side effects or concerns.    -Auditory or visual hallucinations: Denies  -Behaviors different from patient baseline, or any reckless, impulsive, or risky behaviors: Denies  -Symptoms of aicha or psychosis: Denies  -Self-injurious behavior: Denies  -SI/HI: The patient adamantly denies any suicidal or homicidal ideations, plans, or intent at the time of this encounter and is convincing.    -Using a shared decision-making " approach the patient reports they would like to continue their current treatment/medication regimen without any adjustments/changes at this time.  The patient states that he would like to allow for a few more weeks at current dose adjustments/changes.  He states that if anxiety symptoms do not improve prior to next visit he would be interested in potentially increasing the dose of Viibryd, but states that if symptoms improve he would be interested in discontinuing at current dose but that he would like to make this evaluation at next visit after he monitors how he does over the next 6 weeks or so.    -The patient does verbally contract for safety at today's encounter and is in verbal agreement with the safety/crisis plan. The patient reports in their own words that they will reach out to this APRN/office prior to next scheduled appointment if there is any worsening of mood, any new psychiatric symptoms, any medication side effects or concerns, any concern for safety to self or others, any suicidal or homicidal ideations plans or intent, or any concerns, or they will call 911, call or text the suicide and crisis lifeline at [phone], or go to the closest emergency department.               Prior Psychiatric Medications:  Lamictal 200 mg daily - the patient states that he was started on this medication in 2019 and discontinued it approximately 2-3 weeks ago.  The patient states that he was started on this medication by his PMHNP after she diagnosed him with bipolar II disorder.  The patient states that he felt like his mood was better when he was taking this medication, but states that he did not like that the medication made him feel more fatigued.   Strattera 10 mg daily - caused sexual side effects   Clonidine 0.1 mg nightly as needed for insomnia/anxiety - caused hypotension   Zoloft - ineffective/caused SI  Wellbutrin  mg twice daily -initially effective for depression/anxiety/ADHD, but later seems to have lost  efficacy        The following portions of the patient's history were reviewed and updated as appropriate: allergies, current medications, past family history, past medical history, past social history, past surgical history and problem list.    Review of Systems   Constitutional:  Negative for activity change, appetite change, fatigue and unexpected weight change.   Psychiatric/Behavioral:  Positive for dysphoric mood. Negative for agitation, behavioral problems, confusion, decreased concentration, hallucinations, self-injury, sleep disturbance and suicidal ideas. The patient is nervous/anxious. The patient is not hyperactive.        Objective   Physical Exam  Constitutional:       Appearance: Normal appearance.   Neurological:      Mental Status: He is alert.   Psychiatric:         Attention and Perception: Attention and perception normal.         Mood and Affect: Affect normal. Mood is anxious and depressed.         Speech: Speech normal.         Behavior: Behavior normal. Behavior is cooperative.         Thought Content: Thought content normal. Thought content does not include homicidal or suicidal ideation. Thought content does not include homicidal or suicidal plan.         Cognition and Memory: Cognition and memory normal.         Judgment: Judgment normal.       There were no vitals taken for this visit.    The patient was seen remotely today via a MyChart Video Visit through [facility].  Unable to obtain vital signs due to nature of remote visit.  Height stated at 67 inches.  Weight stated at 210 pounds.     Due to the nature of virtual visits and inability to monitor vital signs and weight with virtual visits, the patient has been encouraged to monitor their vital signs and weight regularly either through self-monitoring via home device(s) or with their Primary Care Provider, and the patient has been instructed to notify this APRN of any abnormalities or significant changes from baseline.        No Known  Allergies    Recent Results (from the past 2016 hour(s))   ECG 12 Lead    Collection Time: 04/02/24  2:48 PM   Result Value Ref Range    QT Interval 382 ms    QTC Interval 390 ms         Current Medications:   Current Outpatient Medications   Medication Sig Dispense Refill    buPROPion XL (WELLBUTRIN XL) 300 MG 24 hr tablet Take 1 tablet by mouth Daily. 90 tablet 0    vilazodone (VIIBRYD) 10 MG tablet tablet Take 1 tablet by mouth Daily. 30 tablet 0     No current facility-administered medications for this visit.       Mental Status Exam:   Hygiene:   good  Cooperation:  Cooperative  Eye Contact:  Good  Psychomotor Behavior:  Appropriate  Affect:  Full range  Hopelessness: Denies  Speech:  Normal  Thought Process:  Goal directed and Linear  Thought Content:  Normal  Suicidal:  None  Homicidal:  None  Hallucinations:  None  Delusion:  None  Memory:  Intact  Orientation:  Person, Place, Time and Situation  Reliability:  good  Insight:  Good  Judgement:  Good  Impulse Control:  Good  Physical/Medical Issues:  Yes See medical histry        PHQ-9 Depression Screening  Little interest or pleasure in doing things? (P) 1-->several days   Feeling down, depressed, or hopeless? (P) 1-->several days   Trouble falling or staying asleep, or sleeping too much? (P) 0-->not at all   Feeling tired or having little energy? (P) 2-->more than half the days   Poor appetite or overeating? (P) 1-->several days   Feeling bad about yourself - or that you are a failure or have let yourself or your family down? (P) 0-->not at all   Trouble concentrating on things, such as reading the newspaper or watching television? (P) 1-->several days   Moving or speaking so slowly that other people could have noticed? Or the opposite - being so fidgety or restless that you have been moving around a lot more than usual? (P) 1-->several days   Thoughts that you would be better off dead, or of hurting yourself in some way? (P) 0-->not at all   PHQ-9 Total  Score (P) 7   If you checked off any problems, how difficult have these problems made it for you to do your work, take care of things at home, or get along with other people? (P) somewhat difficult       RADHA-7  Feeling nervous, anxious or on edge: (P) Several days  Not being able to stop or control worrying: (P) Several days  Worrying too much about different things: (P) Several days  Trouble Relaxing: (P) More than half the days  Being so restless that it is hard to sit still: (P) Several days  Feeling afraid as if something awful might happen: (P) Several days  Becoming easily annoyed or irritable: (P) More than half the days  RADHA 7 Total Score: (P) 9  If you checked any problems, how difficult have these problems made it for you to do your work, take care of things at home, or get along with other people: (P) Somewhat difficult        Assessment & Plan   Diagnoses and all orders for this visit:    1. Moderate episode of recurrent major depressive disorder (Primary)  -     vilazodone (VIIBRYD) 10 MG tablet tablet; Take 1 tablet by mouth Daily.  Dispense: 30 tablet; Refill: 0    2. Generalized anxiety disorder  -     vilazodone (VIIBRYD) 10 MG tablet tablet; Take 1 tablet by mouth Daily.  Dispense: 30 tablet; Refill: 0    3. ADHD, adult residual type            Visit Time (face to face direct patient care):  In/Start Time: 3:02 PM EST.  Out/Stop: 3:23 PM EST.  21 minutes of face to face direct patient care with the patient spent in coordination of care and counseling the patient regarding diagnoses and treatment planning. Answered any questions patient had with medication and treatment plan.          Total Time spent by this APRN for today's encounter:  32 total minutes were spent by this APRN on charting/documentation, review of past medical records, and direct face to face patient care.           Visit Diagnoses:    ICD-10-CM ICD-9-CM   1. Moderate episode of recurrent major depressive disorder  F33.1 296.32   2.  Generalized anxiety disorder  F41.1 300.02   3. ADHD, adult residual type  F90.8 314.01             GOALS:  Short Term Goals: Patient will be compliant with medication, and patient will have no significant medication related side effects.  Patient will be engaged in psychotherapy as indicated.  Patient will report subjective improvement of symptoms.  Long term goals: To stabilize mood and treat/improve subjective symptoms, the patient will stay out of the hospital, the patient will be at an optimal level of functioning, and the patient will take all medications as prescribed.  The patient verbalized understanding and agreement with goals that were mutually set.      SUICIDE RISK ASSESSMENT: Unalterable demographics and a history of mental health intervention indicate this patient is in a high risk category compared to the general population. At present, the patient denies active SI/HI, intentions, or plans at this time and agrees to seek immediate care should such thoughts develop. The patient verbalizes understanding of how to access emergency care if needed and agrees to do so. Consideration of suicide risk and protective factors such as history, current presentation, individual strengths and weaknesses, psychosocial and environmental stressors and variables, psychiatric illness and symptoms, medical conditions and pain, took place in this interview. Based on those considerations, the patient is determined: within individual baseline and presenting no imminent risk for suicide or homicide. Other recommendations: The patient does not meet the criteria for inpatient admission and is not a safety risk to self or others at today's visit. Inpatient treatment offers no significant advantages over outpatient treatment for this patient at today's visit.      SAFETY PLAN:  Patient was given ample time for questions and fully participated in treatment planning.  Patient was encouraged to call the clinic with any questions  or concerns.  Patient was informed of access to emergency care. If patient were to develop any significant symptomatology, suicidal ideation, homicidal ideation, any concerns, or feel unsafe at any time they are to call the clinic and if unable to get immediate assistance should immediately call 911 or go to the nearest emergency room.  The patient is advised to remove or secure (lock away) all lethal weapons (including guns) and sharps (including razors, scissors, knives, etc.).  All medications (including any prescribed and any over the counter medications) should be stored in a safe and secured location that is not obtainable by children/adolescents.  Patient was given an opportunity and encouraged to ask questions about their medication, illness, and treatment. Patient contracted verbally for the following: If you are experiencing an emotional crisis or have thoughts of harming yourself or others, please go to your nearest local emergency room or call 911. Will continue to re-assess medication response and side effects frequently to establish efficacy and ensure safety. Risks, any black box warnings, side effects, off label usage, and benefits of medication and treatment discussed with patient, along with potential adverse side effects of current and/or newly prescribed medication, alternative treatment options, and OTC medications.  Patient verbalized understanding of potential risks, any off label use of medication, any black box warnings, and any side effects in their own words. The patient verbalized understanding and agreed to comply with the safety plan discussed in their own words.  Patient given the number to the office. Number also available to the 24- hour suicide hotline.       TREATMENT PLAN/GOALS: Continue medications and treatment plan as indicated. Treatment and medication options discussed during today's visit. Patient acknowledged and verbally consented to continue with current treatment plan  and was educated on the importance of compliance with treatment and follow-up appointments.        -Continue Viibryd 10 mg daily for depression/anxiety   -Continue Wellbutrin  mg twice daily for ADHD and as adjunct for depression/anxiety        MEDICATION ISSUES: Discussed medication options and treatment plan of prescribed medication, any off label use of medication, as well as the risks, benefits, any black box warnings including increased suicidality, and side effects including but not limited to potential falls, dizziness, possible impaired driving, GI side effects (change in appetite, abdominal discomfort, nausea, vomiting, diarrhea, and/or constipation), dry mouth, somnolence, sedation, insomnia, activation, agitation, irritation, tremors, abnormal muscle movements or disorders, headache, sweating, possible bruising or rare bleeding, electrolyte and/or fluid abnormalities, change in blood pressure/heart rate/and or heart rhythm, sexual dysfunction, and metabolic adversities among others. Patient and/or guardian agreeable to call the office with any worsening of symptoms or onset of side effects, or if any concerns or questions arise.  The contact information for the office is made available to the patient and/or guardian.  Patient and/or guardian agreeable to call 911 or go to the nearest ER should they begin having any SI/HI, or if any urgent concerns arise. No medication side effects or related complaints today.            VERBAL INFORMED CONSENT FOR MEDICATION:  The patient was educated that their proposed/prescribed psychotropic medication(s) has potential risks, side effects, adverse effects, and black box warnings; and these have been discussed with the patient.  The patient has been informed that their treatment and medication dosage is to be individualized, and may even be above or below the recommended range/dosage due to patient individualization and response, but medication is prescribed using  a shared decision making approach, and no medication or dosage will be prescribed without the patient's verbal consent.  The reason for the use of the medication including any off label use and alternative modes of treatment other than or in addition to medication has been considered and discussed, the probable consequences of not receiving the proposed treatment have been discussed, and any treatment side effects, black box warnings, and cautions associated with treatment have been discussed with the patient.  The patient is allowed ample time to openly discuss and ask questions regarding the proposed medication(s) and treatment plan and the patient verbalizes understanding the reasons for the use of the medication, its potential risks and benefits, other alternative treatment(s), and the probable consequences that may occur if the proposed medication is not given.  The patient has been given ample time to ask questions and study the information and find the information to be specific, accurate, and complete.  The patient gives verbal consent for the medication(s) proposed/prescribed, they verbalized understanding that they can refuse and withdraw consent at any time with the assistance of this APRN, and the patient has verbally confirmed that they are aware, and are willing, to take the prescribed medication and follow the treatment plan with the known possible risks, side effect, black box warnings, and any potential medication interactions, and the patient reports they will be worse off without this medication and treatment plan.  The patient is advised to contact this APRN/this office if any questions or concerns arise at any time (at [phone]), or call 911/go to the closest emergency department if needed or outside of office hours.          [facility] No Show Policy:  We understand unexpected circumstances arise; however, anytime you miss your appointment we are unable to provide  you appropriate care.  In addition, each appointment missed could have been used to provide care for others.  We ask that you call at least 24 hours in advance to cancel or reschedule an appointment.  We would like to take this opportunity to remind you of our policy stating patients who miss THREE or more appointments without cancelling or rescheduling 24 hours in advance of the appointment may be subject to cancellation of any further visits with our clinic and recommendation to seek in-person services/visits.    Please call [phone] to reschedule your appointment. If there are reasons that make it difficult for you to keep the appointments, please call and let us know how we can help.  Please understand that medication prescribing will not continue without seeing your provider.      [facility]'s No Show Policy reviewed with patient at today's visit. Patient verbalized understanding of this policy. Discussed with patient that in the event that there are three or more no show visits, it will be recommended that they pursue in-person services/visits as noncompliance with telehealth visits indicates that patient is not an appropriate candidate for telemedicine and would likely be more appropriate for in-person services/visits. Patient verbalizes understanding and is agreeable to this.           MEDS ORDERED DURING VISIT:  New Medications Ordered This Visit   Medications    vilazodone (VIIBRYD) 10 MG tablet tablet     Sig: Take 1 tablet by mouth Daily.     Dispense:  30 tablet     Refill:  0       Return in about 6 weeks (around 7/11/2024), or if symptoms worsen or fail to improve, for Recheck.        Patient will follow-up in 6 weeks, highly encouraged the patient if he had any questions or concerns to contact the behavioral health virtual clinic for sooner appointment patient verbalized understanding.      Functional Status: Moderate impairment     Prognosis: Fair with Ongoing Treatment              This document has been electronically signed by ELMO Last  May 30, 2024 15:34 EDT      Some of the data in this electronic note has been brought forward from a previous encounter, any necessary changes have been made, it has been reviewed by this APRN, and it is accurate.       Part of this note may be an electronic transcription/translation of spoken language to printed text using the Dragon Dictation System.

## 2024-07-09 DIAGNOSIS — F41.1 GENERALIZED ANXIETY DISORDER: Chronic | ICD-10-CM

## 2024-07-09 DIAGNOSIS — F33.1 MODERATE EPISODE OF RECURRENT MAJOR DEPRESSIVE DISORDER: Chronic | ICD-10-CM

## 2024-07-09 RX ORDER — VILAZODONE HYDROCHLORIDE 10 MG/1
10 TABLET ORAL DAILY
Qty: 30 TABLET | Refills: 0 | Status: SHIPPED | OUTPATIENT
Start: 2024-07-09

## 2024-07-10 ENCOUNTER — TELEMEDICINE (OUTPATIENT)
Dept: PSYCHIATRY | Facility: CLINIC | Age: 44
End: 2024-07-10
Payer: COMMERCIAL

## 2024-07-10 ENCOUNTER — PRIOR AUTHORIZATION (OUTPATIENT)
Dept: PSYCHIATRY | Facility: CLINIC | Age: 44
End: 2024-07-10

## 2024-07-10 DIAGNOSIS — F41.1 GENERALIZED ANXIETY DISORDER: Chronic | ICD-10-CM

## 2024-07-10 DIAGNOSIS — F90.8 ADHD, ADULT RESIDUAL TYPE: Chronic | ICD-10-CM

## 2024-07-10 DIAGNOSIS — F33.1 MODERATE EPISODE OF RECURRENT MAJOR DEPRESSIVE DISORDER: Primary | Chronic | ICD-10-CM

## 2024-07-10 PROCEDURE — 99214 OFFICE O/P EST MOD 30 MIN: CPT | Performed by: NURSE PRACTITIONER

## 2024-07-10 RX ORDER — BUPROPION HYDROCHLORIDE 300 MG/1
300 TABLET ORAL DAILY
Qty: 90 TABLET | Refills: 1 | Status: SHIPPED | OUTPATIENT
Start: 2024-07-10

## 2024-07-10 NOTE — PROGRESS NOTES
This provider is located at the [facility] (through [facility]), [address], [facility], [address] using a secure [employer] Video Visit through [employer]. Patient is being seen remotely via telehealth at their home address in [place], and stated they are in a secure environment for this session. The patient's condition being diagnosed/treated is appropriate for telemedicine. The provider identified herself as well as her credentials. The patient, and/or patients guardian, consent to be seen remotely, and when consent is given they understand that the consent allows for patient identifiable information to be sent to a third party as needed. They may refuse to be seen remotely at any time. The electronic data is encrypted and password protected, and the patient and/or guardian has been advised of the potential risks to privacy not withstanding such measures.    You have chosen to receive care through a telehealth visit.  Do you consent to use a video/audio connection for your medical care today? Yes     Patient identifiers utilized: Name and date of birth.    Patient verbally confirmed consent for today's encounter  07/10/2024 .    The patient does verbally confirm they are being seen today while physically located in the [facility].  This provider/this APRN is licensed in the [facility] where the patient is located/being seen.           Subjective   Robert Feliciano is a 43 y.o. male who is here today for medication management follow up.    Chief Complaint: Depression, anxiety, ADHD    History of Present Illness:   -Since last encounter with this APRN/Office: The patient reports that overall things have been going okay for him over the past few weeks.  He states that he feels his symptoms have been well controlled and that his current medication regimen has continued to be effective for him.  He states that his anxiety symptoms tend to fluctuate in relation to  "situational life stressors that he is dealing with, but states that overall he feels as if he is calmer and that his anxiety is more well-controlled with the Viibryd.  He states that he has some irritability, but states that it is difficult for him to determine the origin of this.  He states that he is unsure if this is related to normal work stress or potentially something else, but states that overall he feels this has been manageable recently.  He explains that he continues to worry about his mother.  He states that she will be having a bilateral oophorectomy soon as she has nodule on one of her ovaries that is potentially cancerous and has opted to have ovaries removed as a biopsy would be too risky.  He states that he continues to worry about her health, especially given her recent health issues at the beginning of this year.  He states that his mother is also in the process of pursuing a lawsuit due to a recalled spinal stimulator being placed in her spine which caused her a lot of negative side effects.  He states that he worries about her having to go to court and the stress that this may put on her.  He states that outside of the things he thinks he has been doing well recently.  He states that he is concerned about the cost of Viibryd as he endorses that he is paying around $1553-7848 annually for this medication.  He states that he did contact the clinic a few weeks ago and was given a coupon card for this medication and that  endorsed to him that she would be contacting his pharmacy to provide them with the coupon card on his behalf.  He states that he has not attempted to use a coupon card yet but he will plan to in the near future.  He states that co-pays for visits with his APRN are also contributed to financial strain/utilization of his HSA, so requests that visit be pushed out to every 6 months or sooner as needed.      -Appetite reported as: \"good\".  He states that he has been eating a " "little more than normal lately, but states that he is trying to work on appetite management currently.  -Sleep reported as: \"good\".  He states that his sleep schedule has changed recently as his work schedule has changed.  He states that overall he has been acclimating to his schedule change well and that sleep has been good.  He states that on his off days he does tend to sleep in which causes some difficulty falling asleep on his first night back to work, but states that he does not have difficulty sleeping necessarily and that this is solely related to schedule changes.    -Changes in medications or new medical problems/concerns since last visit: Denies  -Reported medication compliance: The patient reports compliance with their current psychotropic medication regimen.    -Reported medication side effects or concerns: Denies    -Auditory or visual hallucinations: Denies  -Behaviors different from patient baseline, or any reckless, impulsive, or risky behaviors: Denies  -Symptoms of aicha or psychosis: Denies  -Self-injurious behavior: Denies  -SI/HI: The patient adamantly denies any suicidal or homicidal ideations, plans, or intent at the time of this encounter and is convincing.    -Using a shared decision-making approach the patient reports they would like to continue their current treatment/medication regimen without any adjustments/changes at this time.  When discussing medication efficacy with the patient, and reassessing the need and appropriateness of continued psychotropic medication treatment and doses, they report they are pleased with management of symptoms at this time, and that their current treatment/medication regimen has continued to be effective for them and they do not want to make any changes or adjustments at today's encounter.      -The patient does verbally contract for safety at today's encounter and is in verbal agreement with the safety/crisis plan. The patient reports in their own words " that they will reach out to this APRN/office prior to next scheduled appointment if there is any worsening of mood, any new psychiatric symptoms, any medication side effects or concerns, any concern for safety to self or others, any suicidal or homicidal ideations plans or intent, or any concerns, or they will call 911, call or text the suicide and crisis lifeline at [phone], or go to the closest emergency department.                 Prior Psychiatric Medications:  Lamictal 200 mg daily - the patient states that he was started on this medication in 2019 and discontinued it approximately 2-3 weeks ago.  The patient states that he was started on this medication by his PMHNP after she diagnosed him with bipolar II disorder.  The patient states that he felt like his mood was better when he was taking this medication, but states that he did not like that the medication made him feel more fatigued.   Strattera 10 mg daily - caused sexual side effects   Clonidine 0.1 mg nightly as needed for insomnia/anxiety - caused hypotension   Zoloft - ineffective/caused SI  Wellbutrin  mg twice daily -initially effective for depression/anxiety/ADHD, but later seems to have lost efficacy        The following portions of the patient's history were reviewed and updated as appropriate: allergies, current medications, past family history, past medical history, past social history, past surgical history and problem list.    Review of Systems   Constitutional:  Negative for activity change, appetite change, fatigue and unexpected weight change.   Psychiatric/Behavioral:  Positive for dysphoric mood. Negative for agitation, behavioral problems, confusion, decreased concentration, hallucinations, self-injury, sleep disturbance and suicidal ideas. The patient is nervous/anxious. The patient is not hyperactive.        Objective   Physical Exam  Constitutional:       Appearance: Normal appearance.   Neurological:      Mental Status: He is  alert.   Psychiatric:         Attention and Perception: Attention and perception normal.         Mood and Affect: Affect normal. Mood is anxious.         Speech: Speech normal.         Behavior: Behavior normal. Behavior is cooperative.         Thought Content: Thought content normal. Thought content does not include homicidal or suicidal ideation. Thought content does not include homicidal or suicidal plan.         Cognition and Memory: Cognition and memory normal.         Judgment: Judgment normal.       There were no vitals taken for this visit.    The patient was seen remotely today via a MyChart Video Visit through [employer].  Unable to obtain vital signs due to nature of remote visit.  Height stated at 67 inches.  Weight stated at 210 pounds.     Due to the nature of virtual visits and inability to monitor vital signs and weight with virtual visits, the patient has been encouraged to monitor their vital signs and weight regularly either through self-monitoring via home device(s) or with their Primary Care Provider, and the patient has been instructed to notify this APRN of any abnormalities or significant changes from baseline.        No Known Allergies    No results found for this or any previous visit (from the past 2016 hour(s)).        Current Medications:   Current Outpatient Medications   Medication Sig Dispense Refill    buPROPion XL (WELLBUTRIN XL) 300 MG 24 hr tablet Take 1 tablet by mouth Daily. 90 tablet 1    vilazodone (VIIBRYD) 10 MG tablet tablet TAKE 1 TABLET BY MOUTH DAILY 30 tablet 0     No current facility-administered medications for this visit.       Mental Status Exam:   Hygiene:   good  Cooperation:  Cooperative  Eye Contact:  Good  Psychomotor Behavior:  Appropriate  Affect:  Full range  Hopelessness: Denies  Speech:  Normal  Thought Process:  Goal directed and Linear  Thought Content:  Normal  Suicidal:  None  Homicidal:  None  Hallucinations:  None  Delusion:  None  Memory:   Intact  Orientation:  Person, Place, Time and Situation  Reliability:  good  Insight:  Good  Judgement:  Good  Impulse Control:  Good  Physical/Medical Issues:  Yes See medical histry        PHQ-9 Depression Screening  Little interest or pleasure in doing things? (P) 1-->several days   Feeling down, depressed, or hopeless? (P) 1-->several days   Trouble falling or staying asleep, or sleeping too much? (P) 0-->not at all   Feeling tired or having little energy? (P) 1-->several days   Poor appetite or overeating? (P) 2-->more than half the days   Feeling bad about yourself - or that you are a failure or have let yourself or your family down? (P) 1-->several days   Trouble concentrating on things, such as reading the newspaper or watching television? (P) 1-->several days   Moving or speaking so slowly that other people could have noticed? Or the opposite - being so fidgety or restless that you have been moving around a lot more than usual? (P) 0-->not at all   Thoughts that you would be better off dead, or of hurting yourself in some way? (P) 0-->not at all   PHQ-9 Total Score (P) 7   If you checked off any problems, how difficult have these problems made it for you to do your work, take care of things at home, or get along with other people? (P) somewhat difficult       RADHA-7  Feeling nervous, anxious or on edge: (P) Several days  Not being able to stop or control worrying: (P) Not at all  Worrying too much about different things: (P) Not at all  Trouble Relaxing: (P) Several days  Being so restless that it is hard to sit still: (P) Several days  Feeling afraid as if something awful might happen: (P) Not at all  Becoming easily annoyed or irritable: (P) Several days  RADHA 7 Total Score: (P) 4  If you checked any problems, how difficult have these problems made it for you to do your work, take care of things at home, or get along with other people: (P) Somewhat difficult        Assessment & Plan   Diagnoses and all  orders for this visit:    1. Moderate episode of recurrent major depressive disorder (Primary)  -     buPROPion XL (WELLBUTRIN XL) 300 MG 24 hr tablet; Take 1 tablet by mouth Daily.  Dispense: 90 tablet; Refill: 1    2. Generalized anxiety disorder  -     buPROPion XL (WELLBUTRIN XL) 300 MG 24 hr tablet; Take 1 tablet by mouth Daily.  Dispense: 90 tablet; Refill: 1    3. ADHD, adult residual type  -     buPROPion XL (WELLBUTRIN XL) 300 MG 24 hr tablet; Take 1 tablet by mouth Daily.  Dispense: 90 tablet; Refill: 1                Visit Diagnoses:    ICD-10-CM ICD-9-CM   1. Moderate episode of recurrent major depressive disorder  F33.1 296.32   2. Generalized anxiety disorder  F41.1 300.02   3. ADHD, adult residual type  F90.8 314.01               GOALS:  Short Term Goals: Patient will be compliant with medication, and patient will have no significant medication related side effects.  Patient will be engaged in psychotherapy as indicated.  Patient will report subjective improvement of symptoms.  Long term goals: To stabilize mood and treat/improve subjective symptoms, the patient will stay out of the hospital, the patient will be at an optimal level of functioning, and the patient will take all medications as prescribed.  The patient verbalized understanding and agreement with goals that were mutually set.      SUICIDE RISK ASSESSMENT: Unalterable demographics and a history of mental health intervention indicate this patient is in a high risk category compared to the general population. At present, the patient denies active SI/HI, intentions, or plans at this time and agrees to seek immediate care should such thoughts develop. The patient verbalizes understanding of how to access emergency care if needed and agrees to do so. Consideration of suicide risk and protective factors such as history, current presentation, individual strengths and weaknesses, psychosocial and environmental stressors and variables, psychiatric  illness and symptoms, medical conditions and pain, took place in this interview. Based on those considerations, the patient is determined: within individual baseline and presenting no imminent risk for suicide or homicide. Other recommendations: The patient does not meet the criteria for inpatient admission and is not a safety risk to self or others at today's visit. Inpatient treatment offers no significant advantages over outpatient treatment for this patient at today's visit.      SAFETY PLAN:  Patient was given ample time for questions and fully participated in treatment planning.  Patient was encouraged to call the clinic with any questions or concerns.  Patient was informed of access to emergency care. If patient were to develop any significant symptomatology, suicidal ideation, homicidal ideation, any concerns, or feel unsafe at any time they are to call the clinic and if unable to get immediate assistance should immediately call 911 or go to the nearest emergency room.  The patient is advised to remove or secure (lock away) all lethal weapons (including guns) and sharps (including razors, scissors, knives, etc.).  All medications (including any prescribed and any over the counter medications) should be stored in a safe and secured location that is not obtainable by children/adolescents.  Patient was given an opportunity and encouraged to ask questions about their medication, illness, and treatment. Patient contracted verbally for the following: If you are experiencing an emotional crisis or have thoughts of harming yourself or others, please go to your nearest local emergency room or call 911. Will continue to re-assess medication response and side effects frequently to establish efficacy and ensure safety. Risks, any black box warnings, side effects, off label usage, and benefits of medication and treatment discussed with patient, along with potential adverse side effects of current and/or newly prescribed  medication, alternative treatment options, and OTC medications.  Patient verbalized understanding of potential risks, any off label use of medication, any black box warnings, and any side effects in their own words. The patient verbalized understanding and agreed to comply with the safety plan discussed in their own words.  Patient given the number to the office. Number also available to the 24- hour suicide hotline.       TREATMENT PLAN/GOALS: Continue medications and treatment plan as indicated. Treatment and medication options discussed during today's visit. Patient acknowledged and verbally consented to continue with current treatment plan and was educated on the importance of compliance with treatment and follow-up appointments.        -Continue Viibryd 10 mg daily for depression/anxiety   -Continue Wellbutrin  mg twice daily for ADHD and as adjunct for depression/anxiety        MEDICATION ISSUES: Discussed medication options and treatment plan of prescribed medication, any off label use of medication, as well as the risks, benefits, any black box warnings including increased suicidality, and side effects including but not limited to potential falls, dizziness, possible impaired driving, GI side effects (change in appetite, abdominal discomfort, nausea, vomiting, diarrhea, and/or constipation), dry mouth, somnolence, sedation, insomnia, activation, agitation, irritation, tremors, abnormal muscle movements or disorders, headache, sweating, possible bruising or rare bleeding, electrolyte and/or fluid abnormalities, change in blood pressure/heart rate/and or heart rhythm, sexual dysfunction, and metabolic adversities among others. Patient and/or guardian agreeable to call the office with any worsening of symptoms or onset of side effects, or if any concerns or questions arise.  The contact information for the office is made available to the patient and/or guardian.  Patient and/or guardian agreeable to call  911 or go to the nearest ER should they begin having any SI/HI, or if any urgent concerns arise. No medication side effects or related complaints today.            VERBAL INFORMED CONSENT FOR MEDICATION:  The patient was educated that their proposed/prescribed psychotropic medication(s) has potential risks, side effects, adverse effects, and black box warnings; and these have been discussed with the patient.  The patient has been informed that their treatment and medication dosage is to be individualized, and may even be above or below the recommended range/dosage due to patient individualization and response, but medication is prescribed using a shared decision making approach, and no medication or dosage will be prescribed without the patient's verbal consent.  The reason for the use of the medication including any off label use and alternative modes of treatment other than or in addition to medication has been considered and discussed, the probable consequences of not receiving the proposed treatment have been discussed, and any treatment side effects, black box warnings, and cautions associated with treatment have been discussed with the patient.  The patient is allowed ample time to openly discuss and ask questions regarding the proposed medication(s) and treatment plan and the patient verbalizes understanding the reasons for the use of the medication, its potential risks and benefits, other alternative treatment(s), and the probable consequences that may occur if the proposed medication is not given.  The patient has been given ample time to ask questions and study the information and find the information to be specific, accurate, and complete.  The patient gives verbal consent for the medication(s) proposed/prescribed, they verbalized understanding that they can refuse and withdraw consent at any time with the assistance of this APRN, and the patient has verbally confirmed that they are aware, and are  willing, to take the prescribed medication and follow the treatment plan with the known possible risks, side effect, black box warnings, and any potential medication interactions, and the patient reports they will be worse off without this medication and treatment plan.  The patient is advised to contact this APRN/this office if any questions or concerns arise at any time (at [phone]), or call 911/go to the closest emergency department if needed or outside of office hours.          [facility] No Show Policy:  We understand unexpected circumstances arise; however, anytime you miss your appointment we are unable to provide you appropriate care.  In addition, each appointment missed could have been used to provide care for others.  We ask that you call at least 24 hours in advance to cancel or reschedule an appointment.  We would like to take this opportunity to remind you of our policy stating patients who miss THREE or more appointments without cancelling or rescheduling 24 hours in advance of the appointment may be subject to cancellation of any further visits with our clinic and recommendation to seek in-person services/visits.    Please call [phone] to reschedule your appointment. If there are reasons that make it difficult for you to keep the appointments, please call and let us know how we can help.  Please understand that medication prescribing will not continue without seeing your provider.      [facility]'s No Show Policy reviewed with patient at today's visit. Patient verbalized understanding of this policy. Discussed with patient that in the event that there are three or more no show visits, it will be recommended that they pursue in-person services/visits as noncompliance with telehealth visits indicates that patient is not an appropriate candidate for telemedicine and would likely be more appropriate for in-person services/visits. Patient  verbalizes understanding and is agreeable to this.           MEDS ORDERED DURING VISIT:  New Medications Ordered This Visit   Medications    buPROPion XL (WELLBUTRIN XL) 300 MG 24 hr tablet     Sig: Take 1 tablet by mouth Daily.     Dispense:  90 tablet     Refill:  1       Return in about 6 months (around 1/10/2025), or if symptoms worsen or fail to improve, for Recheck.        Patient will follow-up in 6 months, highly encouraged the patient if he had any questions or concerns to contact the behavioral health virtual clinic for sooner appointment patient verbalized understanding.      Functional Status: Moderate impairment     Prognosis: Good with Ongoing Treatment             This document has been electronically signed by ELMO Last  July 10, 2024 15:39 EDT      Some of the data in this electronic note has been brought forward from a previous encounter, any necessary changes have been made, it has been reviewed by this APRN, and it is accurate.       Part of this note may be an electronic transcription/translation of spoken language to printed text using the Dragon Dictation System.

## 2024-07-10 NOTE — TELEPHONE ENCOUNTER
PA approved for name brand viibryd.  Key is [id number].  Patient will use copay card with this for the copay.  Patient and pharmacy made aware.

## 2024-07-10 NOTE — TELEPHONE ENCOUNTER
Patient states that he doesn't want to use the [employer] assist program and would prefer if provider would just switch his medication to something that is more affordable.

## 2024-07-17 ENCOUNTER — TELEPHONE (OUTPATIENT)
Dept: FAMILY MEDICINE CLINIC | Facility: CLINIC | Age: 44
End: 2024-07-17
Payer: COMMERCIAL

## 2024-07-17 NOTE — TELEPHONE ENCOUNTER
Caller: Robert Feliciano    Relationship: Self    Best call back number: [phone]    What medication are you requesting: PREDNISONE    What are your current symptoms: GOT INTO SOME POISON IVY    Have you had these symptoms before:    [x] Yes  [] No    Have you been treated for these symptoms before:   [x] Yes  [] No    If a prescription is needed, what is your preferred pharmacy and phone number: [id number] - [place], [place] - [address] [facility] [address] [facility] [phone] [facility] [phone] FX     Additional notes: PATIENT STATES DR MORALES HAS CALLED THIS IN FOR HIM BEFORE WHEN HE GETS INTO POISON IVY

## 2024-07-18 ENCOUNTER — TELEMEDICINE (OUTPATIENT)
Dept: FAMILY MEDICINE CLINIC | Facility: TELEHEALTH | Age: 44
End: 2024-07-18
Payer: COMMERCIAL

## 2024-07-18 VITALS — WEIGHT: 200 LBS | BODY MASS INDEX: 31.39 KG/M2 | HEIGHT: 67 IN

## 2024-07-18 DIAGNOSIS — L23.7 POISON IVY: Primary | ICD-10-CM

## 2024-07-18 RX ORDER — PREDNISONE 20 MG/1
TABLET ORAL
Qty: 20 TABLET | Refills: 0 | Status: SHIPPED | OUTPATIENT
Start: 2024-07-18

## 2024-07-18 NOTE — PROGRESS NOTES
You have chosen to receive care through a telehealth visit.  Do you consent to use a video/audio connection for your medical care today? Yes     HPI  Robert Feliciano is a 44 y.o. male  presents with complaint of rash. He reports that he has poison ivy. He has had it for a few days and it is not improving with over the counter remedies. It is on both of his arms and his left ankle. It is > on his right forearm. He got it in his backyard. Has tried benadryl cream and a poison ivy scrub without relief from his symptoms.    Review of Systems   Skin:  Positive for rash (itchy, red, blister rash both arms> right, left ankle).       Past Medical History:   Diagnosis Date    Allergic     Anal fissure     Bipolar disorder     RADHA (generalized anxiety disorder)     History of alcohol dependence        Family History   Problem Relation Age of Onset    Osteoarthritis Mother     Anxiety disorder Mother     Depression Mother     Scoliosis Mother     Cancer Father     Hypertension Father     Prostate cancer Father     Dementia Father     Other Father         radiation prostatitis    Anxiety disorder Father     Alcohol abuse Maternal Grandmother     Obesity Maternal Grandfather     Other Paternal Grandmother         unknown    Other Paternal Grandfather         unknown    Diabetes Paternal Uncle     Hypertension Sister        Social History     Socioeconomic History    Marital status:      Spouse name: Haresh    Number of children: 0    Years of education: H.S.    Highest education level: High school graduate   Tobacco Use    Smoking status: Former     Current packs/day: 0.00     Average packs/day: 1 pack/day for 10.0 years (10.0 ttl pk-yrs)     Types: Cigarettes     Start date:      Quit date:      Years since quittin.5    Smokeless tobacco: Never   Vaping Use    Vaping status: Never Used   Substance and Sexual Activity    Alcohol use: Not Currently     Comment: Recently sober    Drug use: Not Currently     " Types: Marijuana     Comment: last use 2010    Sexual activity: Yes     Partners: Male     Birth control/protection: None     Comment: 1 partner in past year.        Robert Feliciano  reports that he quit smoking about 21 years ago. His smoking use included cigarettes. He started smoking about 31 years ago. He has a 10 pack-year smoking history. He has never used smokeless tobacco. He does not dip.       Ht 170.2 cm (67\")   Wt 90.7 kg (200 lb)   BMI 31.32 kg/m²     PHYSICAL EXAM  Physical Exam   Constitutional: He is oriented to person, place, and time. He appears well-developed.   HENT:   Head: Normocephalic and atraumatic.   Nose: Nose normal.   Eyes: Lids are normal. Right eye exhibits no discharge. Left eye exhibits no discharge. Right conjunctiva is not injected. Left conjunctiva is not injected.   Pulmonary/Chest:  No respiratory distress.  Neurological: He is alert and oriented to person, place, and time. No cranial nerve deficit.   Skin:   Erythematous linear rash with some vesicular lesions bilateral upper extremities and left ankle, > right forearm   Psychiatric: He has a normal mood and affect. His speech is normal and behavior is normal. Judgment and thought content normal.       Results for orders placed or performed during the hospital encounter of 04/02/24   ECG 12 Lead   Result Value Ref Range    QT Interval 382 ms    QTC Interval 390 ms       Diagnoses and all orders for this visit:    1. Poison ivy (Primary)    Other orders  -     predniSONE (DELTASONE) 20 MG tablet; Prednisone 20mg tabs, 3 for 3 days, 2 for 3 days, 1 for 3 days, 1/2 for 3 days take with food or milk  Dispense: 20 tablet; Refill: 0    Take prednisone with food as early in the day as possible  Do not take prednisone with nsaids such as ibuprofen, aleve, aspirin,diclofenac  May take tylenol for pain or fever  Allegra in am  Benadryl at hs    FOLLOW-UP  If symptoms worsen or persist follow up with PCP, [facility] or Urgent " Care    Patient verbalizes understanding of medication dosage, comfort measures, instructions for treatment and follow-up.    Kristin FOREMAN Branden, APRN  07/18/2024  15:33 EDT    The use of a video visit has been reviewed with the patient and verbal informed consent has been obtained. Myself and Robert Feliciano participated in this visit. The patient is located in [address] [place] [address].    I am located in [place], [place]. [employer] and [employer] Client were utilized. I spent 24 minutes in the patient's chart for this visit.

## 2024-09-09 ENCOUNTER — OFFICE VISIT (OUTPATIENT)
Dept: FAMILY MEDICINE CLINIC | Facility: CLINIC | Age: 44
End: 2024-09-09
Payer: COMMERCIAL

## 2024-09-09 VITALS
HEIGHT: 67 IN | DIASTOLIC BLOOD PRESSURE: 82 MMHG | BODY MASS INDEX: 34.21 KG/M2 | WEIGHT: 218 LBS | OXYGEN SATURATION: 97 % | SYSTOLIC BLOOD PRESSURE: 122 MMHG | HEART RATE: 71 BPM | TEMPERATURE: 98 F

## 2024-09-09 DIAGNOSIS — Z12.5 PROSTATE CANCER SCREENING: ICD-10-CM

## 2024-09-09 DIAGNOSIS — Z12.83 SKIN CANCER SCREENING: ICD-10-CM

## 2024-09-09 DIAGNOSIS — Z00.00 WELL ADULT EXAM: Primary | ICD-10-CM

## 2024-09-09 DIAGNOSIS — Z80.42 FAMILY HISTORY OF PROSTATE CANCER IN FATHER: ICD-10-CM

## 2024-09-09 DIAGNOSIS — E66.09 CLASS 1 OBESITY DUE TO EXCESS CALORIES WITHOUT SERIOUS COMORBIDITY WITH BODY MASS INDEX (BMI) OF 34.0 TO 34.9 IN ADULT: ICD-10-CM

## 2024-09-09 PROBLEM — R07.9 CHEST PAIN: Status: RESOLVED | Noted: 2023-09-01 | Resolved: 2024-09-09

## 2024-09-09 PROBLEM — M25.512 ACUTE PAIN OF LEFT SHOULDER: Status: RESOLVED | Noted: 2021-05-05 | Resolved: 2024-09-09

## 2024-09-09 PROBLEM — H61.22 IMPACTED CERUMEN OF LEFT EAR: Status: RESOLVED | Noted: 2017-03-29 | Resolved: 2024-09-09

## 2024-09-09 PROBLEM — F31.81 BIPOLAR 2 DISORDER, MAJOR DEPRESSIVE EPISODE: Status: RESOLVED | Noted: 2020-06-03 | Resolved: 2024-09-09

## 2024-09-09 PROCEDURE — 99396 PREV VISIT EST AGE 40-64: CPT | Performed by: FAMILY MEDICINE

## 2024-09-09 NOTE — PATIENT INSTRUCTIONS

## 2024-09-09 NOTE — PROGRESS NOTES
Robert Feliciano is a 44 y.o. male who presents today to complete annual exam.    Chief Complaint   Patient presents with    Annual Exam        Patient has been exercising a couple of times a week doing 30 minutes of cardio and some weight lifting. He has been working on his diet but has been eating too many carbs. He is working on eating more protein and cutting back on carbs. He sees the dentist twice a year. He would like referral to derm as he has not seen them and wants skin cancer screening. He does not have an optometrist and has not seen one in over 10 years. He has no acute complaints today.          Review of Systems   Constitutional:  Negative for fever and unexpected weight loss.   HENT:  Negative for congestion, ear pain and sore throat.    Eyes:  Negative for visual disturbance.   Respiratory:  Negative for cough, shortness of breath and wheezing.    Cardiovascular:  Negative for chest pain and palpitations.   Gastrointestinal:  Negative for abdominal pain, blood in stool, constipation, diarrhea, nausea, vomiting and GERD.   Endocrine: Negative for polydipsia and polyuria.   Genitourinary:  Negative for difficulty urinating.   Musculoskeletal:  Negative for joint swelling.   Skin:  Negative for rash and skin lesions.   Allergic/Immunologic: Negative for environmental allergies.   Neurological:  Negative for seizures and syncope.   Hematological:  Does not bruise/bleed easily.   Psychiatric/Behavioral:  Negative for suicidal ideas.             7/10/2024     2:53 PM   PHQ-2/PHQ-9 Depression Screening   Little Interest or Pleasure in Doing Things 1-->several days   Feeling Down, Depressed or Hopeless 1-->several days   Trouble Falling or Staying Asleep, or Sleeping Too Much 0-->not at all   Feeling Tired or Having Little Energy 1-->several days   Poor Appetite or Overeating 2-->more than half the days   Feeling Bad about Yourself - or that You are a Failure or Have Let Yourself or Your Family Down  1-->several days   Trouble Concentrating on Things, Such as Reading the Newspaper or Watching Television 1-->several days   Moving or Speaking So Slowly that Other People Could Have Noticed? Or the Opposite - Being So Fidgety 0-->not at all   Thoughts that You Would be Better Off Dead or of Hurting Yourself in Some Way 0-->not at all   PHQ-9: Brief Depression Severity Measure Score 7   If You Checked Off Any Problems, How Difficult Have These Problems Made It For You to Do Your Work, Take Care of Things at Home, or Get Along with Other People? somewhat difficult   Little Interest or Pleasure in Doing Things 1-->several days   Feeling Down, Depressed or Hopeless 1-->several days   Trouble Falling or Staying Asleep, or Sleeping Too Much 0-->not at all   Feeling Tired or Having Little Energy 1-->several days   Poor Appetite or Overeating 2-->more than half the days   Feeling Bad about Yourself - or that You are a Failure or Have Let Yourself or Your Family Down 1-->several days   Trouble Concentrating on Things, Such as Reading the Newspaper or Watching Television 1-->several days   Moving or Speaking So Slowly that Other People Could Have Noticed? Or the Opposite - Being So Fidgety 0-->not at all   Thoughts that You Would be Better Off Dead or of Hurting Yourself in Some Way 0-->not at all   If You Checked Off Any Problems, How Difficult Have These Problems Made It For You to Do Your Work, Take Care of Things at Home, or Get Along with Other People? somewhat difficult           Past Medical History:   Diagnosis Date    ADHD (attention deficit hyperactivity disorder)     Allergic     Anal fissure     Colon polyp 2023    Depression     RADHA (generalized anxiety disorder)     Headache     History of alcohol dependence 2010        Past Surgical History:   Procedure Laterality Date    COLONOSCOPY  2024    CYSTOSCOPY  2017    HEMORRHOIDECTOMY  2015    PROCTOSCOPY  2017    WISDOM TOOTH EXTRACTION Bilateral 1997        Family  History   Problem Relation Age of Onset    Osteoarthritis Mother     Anxiety disorder Mother     Depression Mother     Scoliosis Mother     Cancer Father         .    Hypertension Father     Prostate cancer Father     Dementia Father     Anxiety disorder Father     Hypertension Sister     Alcohol abuse Maternal Grandmother     Obesity Maternal Grandfather     Diabetes Paternal Uncle         Social History     Socioeconomic History    Marital status:      Spouse name: Haresh    Number of children: 0    Years of education: H.S.    Highest education level: High school graduate   Tobacco Use    Smoking status: Former     Current packs/day: 0.00     Average packs/day: 1 pack/day for 10.0 years (10.0 ttl pk-yrs)     Types: Cigarettes     Start date:      Quit date:      Years since quittin.7    Smokeless tobacco: Never   Vaping Use    Vaping status: Never Used   Substance and Sexual Activity    Alcohol use: Not Currently     Comment: Recently sober    Drug use: Not Currently     Types: Marijuana     Comment: last use     Sexual activity: Yes     Partners: Male     Birth control/protection: None     Comment: 1 partner in past year.         Current Outpatient Medications on File Prior to Visit   Medication Sig Dispense Refill    buPROPion XL (WELLBUTRIN XL) 300 MG 24 hr tablet Take 1 tablet by mouth Daily. 90 tablet 1    METAMUCIL FIBER PO Take  by mouth.      [DISCONTINUED] predniSONE (DELTASONE) 20 MG tablet Prednisone 20mg tabs, 3 for 3 days, 2 for 3 days, 1 for 3 days, 1/2 for 3 days take with food or milk (Patient not taking: Reported on 2024) 20 tablet 0    [DISCONTINUED] vilazodone (VIIBRYD) 10 MG tablet tablet TAKE 1 TABLET BY MOUTH DAILY (Patient not taking: Reported on 2024) 30 tablet 0     No current facility-administered medications on file prior to visit.       No Known Allergies     Visit Vitals  /82   Pulse 71   Temp 98 °F (36.7 °C) (Temporal)   Ht 170.2 cm  "(67\")   Wt 98.9 kg (218 lb)   SpO2 97%   BMI 34.14 kg/m²      Body mass index is 34.14 kg/m².    Physical Exam  Constitutional:       General: He is not in acute distress.     Appearance: He is well-developed. He is not diaphoretic.   HENT:      Head: Atraumatic.   Cardiovascular:      Rate and Rhythm: Normal rate and regular rhythm.      Heart sounds: Normal heart sounds. No murmur heard.     No friction rub. No gallop.   Pulmonary:      Effort: Pulmonary effort is normal. No respiratory distress.      Breath sounds: Normal breath sounds. No stridor. No wheezing, rhonchi or rales.   Abdominal:      General: Bowel sounds are normal. There is no distension.      Palpations: Abdomen is soft. There is no mass.      Tenderness: There is no abdominal tenderness. There is no guarding or rebound.      Hernia: No hernia is present.   Musculoskeletal:      Cervical back: Normal range of motion and neck supple.   Skin:     General: Skin is warm and dry.   Neurological:      Mental Status: He is alert and oriented to person, place, and time.   Psychiatric:         Behavior: Behavior normal.          Results for orders placed or performed during the hospital encounter of 04/02/24   ECG 12 Lead   Result Value Ref Range    QT Interval 382 ms    QTC Interval 390 ms        Problems Addressed this Visit          Endocrine and Metabolic    Class 1 obesity due to excess calories without serious comorbidity with body mass index (BMI) of 34.0 to 34.9 in adult     Patient's (Body mass index is 34.14 kg/m².) indicates that they are obese (BMI >30) with health conditions that include none . Weight is worsening. BMI  is above average; BMI management plan is completed. We discussed low calorie, low carb based diet program, portion control, increasing exercise, and an lubna-based approach such as appweevr Pal or Lose It.          Relevant Orders    Comprehensive Metabolic Panel    CBC & Differential    Lipid Panel    Hemoglobin A1c       " Family History    Family history of prostate cancer in father    Relevant Orders    PSA Screen       Genitourinary and Reproductive     Prostate cancer screening    Relevant Orders    PSA Screen       Health Encounters    Well adult exam - Primary     The patient is here for health maintenance visit.  Currently, the patient consumes a unhealthy diet and has an adequate exercise regimen.  Screening lab work is ordered.  Immunizations were reviewed today.  Advice and education was given regarding nutrition, aerobic exercise, routine dental evaluations, routine eye exams, reproductive health, cardiovascular risk reduction, sunscreen use, self skin examination (annual dermatology evaluations) and seatbelt use (general overall safety).  Further recommendations will be given if needed after lab evaluation.  Annual wellness evaluation is recommended.           Relevant Orders    Comprehensive Metabolic Panel    CBC & Differential    Lipid Panel    PSA Screen    Hemoglobin A1c    Testosterone       Skin    Skin cancer screening    Relevant Orders    Ambulatory Referral to Dermatology (Completed)     Diagnoses         Codes Comments    Well adult exam    -  Primary ICD-10-CM: Z00.00  ICD-9-CM: V70.0     Prostate cancer screening     ICD-10-CM: Z12.5  ICD-9-CM: V76.44     Family history of prostate cancer in father     ICD-10-CM: Z80.42  ICD-9-CM: V16.42     Class 1 obesity due to excess calories without serious comorbidity with body mass index (BMI) of 34.0 to 34.9 in adult     ICD-10-CM: E66.09, Z68.34  ICD-9-CM: 278.00, V85.34     Skin cancer screening     ICD-10-CM: Z12.83  ICD-9-CM: V76.43             Return in about 1 year (around 9/9/2025) for Annual.    Mike Restrepo MD  9/9/2024       5

## 2024-10-24 ENCOUNTER — LAB (OUTPATIENT)
Dept: LAB | Facility: HOSPITAL | Age: 44
End: 2024-10-24
Payer: COMMERCIAL

## 2024-10-24 DIAGNOSIS — E66.811 CLASS 1 OBESITY DUE TO EXCESS CALORIES WITHOUT SERIOUS COMORBIDITY WITH BODY MASS INDEX (BMI) OF 34.0 TO 34.9 IN ADULT: ICD-10-CM

## 2024-10-24 DIAGNOSIS — Z80.42 FAMILY HISTORY OF PROSTATE CANCER IN FATHER: ICD-10-CM

## 2024-10-24 DIAGNOSIS — Z00.00 WELL ADULT EXAM: ICD-10-CM

## 2024-10-24 DIAGNOSIS — Z12.5 PROSTATE CANCER SCREENING: ICD-10-CM

## 2024-10-24 DIAGNOSIS — E66.09 CLASS 1 OBESITY DUE TO EXCESS CALORIES WITHOUT SERIOUS COMORBIDITY WITH BODY MASS INDEX (BMI) OF 34.0 TO 34.9 IN ADULT: ICD-10-CM

## 2024-10-24 LAB
ALBUMIN SERPL-MCNC: 4.2 G/DL (ref 3.5–5.2)
ALBUMIN/GLOB SERPL: 1.4 G/DL
ALP SERPL-CCNC: 76 U/L (ref 39–117)
ALT SERPL W P-5'-P-CCNC: 18 U/L (ref 1–41)
ANION GAP SERPL CALCULATED.3IONS-SCNC: 16 MMOL/L (ref 5–15)
AST SERPL-CCNC: 19 U/L (ref 1–40)
BASOPHILS # BLD AUTO: 0.03 10*3/MM3 (ref 0–0.2)
BASOPHILS NFR BLD AUTO: 0.6 % (ref 0–1.5)
BILIRUB SERPL-MCNC: 0.4 MG/DL (ref 0–1.2)
BUN SERPL-MCNC: 21 MG/DL (ref 6–20)
BUN/CREAT SERPL: 16.3 (ref 7–25)
CALCIUM SPEC-SCNC: 9.4 MG/DL (ref 8.6–10.5)
CHLORIDE SERPL-SCNC: 101 MMOL/L (ref 98–107)
CHOLEST SERPL-MCNC: 121 MG/DL (ref 0–200)
CO2 SERPL-SCNC: 24 MMOL/L (ref 22–29)
CREAT SERPL-MCNC: 1.29 MG/DL (ref 0.76–1.27)
DEPRECATED RDW RBC AUTO: 40 FL (ref 37–54)
EGFRCR SERPLBLD CKD-EPI 2021: 69.3 ML/MIN/1.73
EOSINOPHIL # BLD AUTO: 0.08 10*3/MM3 (ref 0–0.4)
EOSINOPHIL NFR BLD AUTO: 1.6 % (ref 0.3–6.2)
ERYTHROCYTE [DISTWIDTH] IN BLOOD BY AUTOMATED COUNT: 12.6 % (ref 12.3–15.4)
GLOBULIN UR ELPH-MCNC: 3.1 GM/DL
GLUCOSE SERPL-MCNC: 109 MG/DL (ref 65–99)
HBA1C MFR BLD: 5.4 % (ref 4.8–5.6)
HCT VFR BLD AUTO: 48.3 % (ref 37.5–51)
HDLC SERPL-MCNC: 46 MG/DL (ref 40–60)
HGB BLD-MCNC: 16.3 G/DL (ref 13–17.7)
IMM GRANULOCYTES # BLD AUTO: 0.02 10*3/MM3 (ref 0–0.05)
IMM GRANULOCYTES NFR BLD AUTO: 0.4 % (ref 0–0.5)
LDLC SERPL CALC-MCNC: 61 MG/DL (ref 0–100)
LDLC/HDLC SERPL: 1.34 {RATIO}
LYMPHOCYTES # BLD AUTO: 1.61 10*3/MM3 (ref 0.7–3.1)
LYMPHOCYTES NFR BLD AUTO: 31.9 % (ref 19.6–45.3)
MCH RBC QN AUTO: 29.4 PG (ref 26.6–33)
MCHC RBC AUTO-ENTMCNC: 33.7 G/DL (ref 31.5–35.7)
MCV RBC AUTO: 87.2 FL (ref 79–97)
MONOCYTES # BLD AUTO: 0.32 10*3/MM3 (ref 0.1–0.9)
MONOCYTES NFR BLD AUTO: 6.3 % (ref 5–12)
NEUTROPHILS NFR BLD AUTO: 2.99 10*3/MM3 (ref 1.7–7)
NEUTROPHILS NFR BLD AUTO: 59.2 % (ref 42.7–76)
NRBC BLD AUTO-RTO: 0 /100 WBC (ref 0–0.2)
PLATELET # BLD AUTO: 216 10*3/MM3 (ref 140–450)
PMV BLD AUTO: 9.2 FL (ref 6–12)
POTASSIUM SERPL-SCNC: 4.1 MMOL/L (ref 3.5–5.2)
PROT SERPL-MCNC: 7.3 G/DL (ref 6–8.5)
PSA SERPL-MCNC: 1.02 NG/ML (ref 0–4)
RBC # BLD AUTO: 5.54 10*6/MM3 (ref 4.14–5.8)
SODIUM SERPL-SCNC: 141 MMOL/L (ref 136–145)
TESTOST SERPL-MCNC: 595 NG/DL (ref 249–836)
TRIGL SERPL-MCNC: 66 MG/DL (ref 0–150)
VLDLC SERPL-MCNC: 14 MG/DL (ref 5–40)
WBC NRBC COR # BLD AUTO: 5.05 10*3/MM3 (ref 3.4–10.8)

## 2024-10-24 PROCEDURE — G0103 PSA SCREENING: HCPCS

## 2024-10-24 PROCEDURE — 36415 COLL VENOUS BLD VENIPUNCTURE: CPT

## 2024-10-24 PROCEDURE — 80053 COMPREHEN METABOLIC PANEL: CPT

## 2024-10-24 PROCEDURE — 84403 ASSAY OF TOTAL TESTOSTERONE: CPT

## 2024-10-24 PROCEDURE — 80061 LIPID PANEL: CPT

## 2024-10-24 PROCEDURE — 85025 COMPLETE CBC W/AUTO DIFF WBC: CPT

## 2024-10-24 PROCEDURE — 83036 HEMOGLOBIN GLYCOSYLATED A1C: CPT

## 2024-11-08 DIAGNOSIS — R79.89 ELEVATED SERUM CREATININE: Primary | ICD-10-CM

## 2025-01-08 ENCOUNTER — DOCUMENTATION (OUTPATIENT)
Dept: PSYCHIATRY | Facility: CLINIC | Age: 45
End: 2025-01-08

## 2025-01-08 NOTE — PROGRESS NOTES
Patient was scheduled for follow-up appointment for medication management today 1/8/25 for 3:00 PM EST.  Provider attempted to connect to the patient to begin today's scheduled appointment but was unable to successfully connect to the patient.  Provider attempted to contact the patient via telephone call in effort to determine if there were any technical difficulties and to help with troubleshooting, but patient did not answer phone call.  Allowed several more minutes for the patient to get connected to this APRN but patient never connected.  21 minutes after scheduled appointment time this APRN instructed support staff to cancel today's appointment due to inability to make contact with the patient, and to call the patient to leave a voicemail and/or send a my chart message to let the patient know that the appointment was canceled and why and to instruct the patient to contact the clinic to be rescheduled at his convenience.

## 2025-01-29 ENCOUNTER — TELEPHONE (OUTPATIENT)
Dept: PSYCHIATRY | Facility: CLINIC | Age: 45
End: 2025-01-29
Payer: COMMERCIAL

## 2025-01-29 DIAGNOSIS — F33.1 MODERATE EPISODE OF RECURRENT MAJOR DEPRESSIVE DISORDER: Chronic | ICD-10-CM

## 2025-01-29 DIAGNOSIS — F41.1 GENERALIZED ANXIETY DISORDER: Chronic | ICD-10-CM

## 2025-01-29 DIAGNOSIS — F90.8 ADHD, ADULT RESIDUAL TYPE: Chronic | ICD-10-CM

## 2025-01-29 RX ORDER — BUPROPION HYDROCHLORIDE 300 MG/1
300 TABLET ORAL DAILY
Qty: 90 TABLET | Refills: 0 | Status: SHIPPED | OUTPATIENT
Start: 2025-01-29

## 2025-01-29 NOTE — TELEPHONE ENCOUNTER
Pt refill request for Wellbutrin 300 mg     Pt has changed pharmacies to     [facility]:     [address], [place] [id number] [phone]

## 2025-02-05 ENCOUNTER — TELEMEDICINE (OUTPATIENT)
Dept: PSYCHIATRY | Facility: CLINIC | Age: 45
End: 2025-02-05
Payer: COMMERCIAL

## 2025-02-05 DIAGNOSIS — F41.1 GENERALIZED ANXIETY DISORDER: Chronic | ICD-10-CM

## 2025-02-05 DIAGNOSIS — F33.1 MODERATE EPISODE OF RECURRENT MAJOR DEPRESSIVE DISORDER: Primary | Chronic | ICD-10-CM

## 2025-02-05 DIAGNOSIS — F90.8 ADHD, ADULT RESIDUAL TYPE: Chronic | ICD-10-CM

## 2025-02-05 DIAGNOSIS — Z53.21 PATIENT LEFT WITHOUT BEING SEEN: ICD-10-CM

## 2025-02-05 NOTE — PROGRESS NOTES
"The patient left the office before care was provided and did not complete the visit  due to technical difficulties .    The patient arrived for today's visit, but when this APRN attempted to connect to the patient to begin the video visit the patient had left the visit and left the following comments in the chat box feature of the video visit:  \"For some reason, my camera can't connect.\" and \"My computer keeps dropping the camera connection.\"     This provider attempted to call the patient multiple times to try to help troubleshoot technical difficulties and if this failed to switch the visit to a telephone encounter, but the patient's phone would not ring and continually went straight to voicemail.  Attempted to allow more time and attempt to call the patient back again, but while waiting the patient called the clinic and spoke to  to reschedule visit.   was instructed to discuss with the patient that he needs to have his phone available so that we may make contact via telephone call to help troubleshoot issues surrounding video visits as necessary as this is 1 of multiple occasions that we have been unable to reach the patient and troubleshoot technical difficulties resulting in visit having to be canceled/rescheduled.  Instructed  to inform patient that if this continues to be an issue we will need to refer in person as ability to make contact with the patient is a necessity for continuing visits on a telemedicine platform.  "

## 2025-02-10 ENCOUNTER — TELEMEDICINE (OUTPATIENT)
Dept: PSYCHIATRY | Facility: CLINIC | Age: 45
End: 2025-02-10
Payer: COMMERCIAL

## 2025-02-10 DIAGNOSIS — F41.1 GENERALIZED ANXIETY DISORDER: Chronic | ICD-10-CM

## 2025-02-10 DIAGNOSIS — F33.1 MODERATE EPISODE OF RECURRENT MAJOR DEPRESSIVE DISORDER: Primary | Chronic | ICD-10-CM

## 2025-02-10 DIAGNOSIS — F90.8 ADHD, ADULT RESIDUAL TYPE: Chronic | ICD-10-CM

## 2025-02-10 PROCEDURE — 99214 OFFICE O/P EST MOD 30 MIN: CPT | Performed by: NURSE PRACTITIONER

## 2025-02-10 PROCEDURE — 96127 BRIEF EMOTIONAL/BEHAV ASSMT: CPT | Performed by: NURSE PRACTITIONER

## 2025-02-10 RX ORDER — PROPRANOLOL HYDROCHLORIDE 10 MG/1
10 TABLET ORAL 2 TIMES DAILY PRN
Qty: 60 TABLET | Refills: 0 | Status: SHIPPED | OUTPATIENT
Start: 2025-02-10

## 2025-02-10 RX ORDER — BUPROPION HYDROCHLORIDE 300 MG/1
300 TABLET ORAL DAILY
Qty: 90 TABLET | Refills: 0 | Status: SHIPPED | OUTPATIENT
Start: 2025-02-10

## 2025-02-10 NOTE — PROGRESS NOTES
This provider is located at home office working remotely through the [facility] (through [facility]), [address], [place], [address] using a secure [employer] Video Visit through [employer]. Patient is being seen remotely via telehealth at their home address in [place], and stated they are in a secure environment for this session. The patient's condition being diagnosed/treated is appropriate for telemedicine. The provider identified herself as well as her credentials. The patient, and/or patients guardian, consent to be seen remotely, and when consent is given they understand that the consent allows for patient identifiable information to be sent to a third party as needed. They may refuse to be seen remotely at any time. The electronic data is encrypted and password protected, and the patient and/or guardian has been advised of the potential risks to privacy not withstanding such measures.    You have chosen to receive care through a telehealth visit.  Do you consent to use a video/audio connection for your medical care today? Yes     Patient identifiers utilized: Name and date of birth.    Patient verbally confirmed consent for today's encounter  02/10/2025 .    The patient does verbally confirm they are being seen today while physically located in the [facility].  This provider/this APRN is licensed in the [facility] where the patient is located/being seen.           Subjective   Robert Feliciano is a 44 y.o. male who is here today for medication management follow up.    Chief Complaint: Depression, anxiety, ADHD    History of Present Illness:   -Last visit with this APRN: 07/10/2024  -Since last encounter with this APRN/Office: The patient endorses that overall he has been doing fairly well over the past few months.  He states that recently his depression has been somewhat increased, but states that this is situational.  He explains that his uncle recently  "passed away.  He states that he was not very close with his uncle, but states that his mother has had a difficult time with his uncle's passing as she is having to deal with his estate and it has just been difficult on her losing the patient's father and now his uncle over the past couple of years.  The patient states that due to his work schedule it has been difficult for him to be very active throughout the day and endorses that things around the home have not been getting done \"on schedule and that this has been a bit of an adjustment for him but states that overall he and his spouse have been working through this.  He states that his main concern at this time is that he is noticing an increase in his anxiety typically in the afternoons when it is time to go home from work and also whenever he is having a lot of stress in the workplace.  He states that he has been doing some research regarding beta-blockers used as needed for exacerbations of anxiety and endorses that this is something he has been considering as he feels like it would be a good option for him.  He endorses that overall he feels that his anxiety is mainly situational and a lot of times may also be related to feeling like he is falling behind at home and not getting things done, so states that given the situational nature of his anxiety currently he feels a beta-blocker would be helpful.  -Mood reported as: \"okay\"  -Patient rates symptoms of depression at a 7/10 on a 0-10 scale, with 10 being the worst.   -Patient rates symptoms of anxiety at a 6-7/10 on a 0-10 scale, with 10 being the worst.    -Appetite reported as: \"good\"  -Sleep reported as: \"okay\".  Reports that overall sleep has been okay but he has continued to struggle with acclimating to work schedule.  He states that he thought it would take him around 6 months or so to get acclimated to the new work schedule, but states that it has been well over 6 months now and he is still struggling " and that it has continued to be a tough adjustment for him.  States that he often finds himself sleeping more on the weekends just catching up on sleep due to odd sleep schedule through the week related to work shift.  Denies any nightmares or bad dreams.    -Changes in medications or new medical problems/concerns since last visit: Denies  -Reported medication compliance: Reports he discontinued taking the Viibryd several months ago due to issues with cost.  The patient otherwise reports compliance with their current psychotropic medication regimen.  He reports that the Viibryd was very effective for him and that he was tolerating well, but that he just could not afford 200+ dollars per month for the medication.  -Reported medication side effects or concerns: Denies    -Auditory or visual hallucinations: Denies  -Behaviors different from patient baseline, or any reckless, impulsive, or risky behaviors: Denies  -Symptoms of aicha or psychosis: Denies  -Self-injurious behavior: Denies  -SI/HI: The patient adamantly denies any suicidal or homicidal ideations, plans, or intent at the time of this encounter and is convincing.  -No abnormal muscle movements or tics noted by this APRN during today's encounter, and the patient denies any of these symptoms.      -Using a shared decision-making approach the patient reports he would like to trial propranolol as needed for exacerbations of anxiety.  He states that otherwise he would like to continue his current treatment regimen without any adjustments/changes at this time.    -The patient does verbally contract for safety at today's encounter and is in verbal agreement with the safety/crisis plan. The patient reports in their own words that they will reach out to this APRN/office prior to next scheduled appointment if there is any worsening of mood, any new psychiatric symptoms, any medication side effects or concerns, any concern for safety to self or others, any suicidal or  homicidal ideations plans or intent, or any concerns, or they will call 911, call or text the suicide and crisis lifeline at [phone], or go to the closest emergency department.            Prior Psychiatric Medications:  Lamictal 200 mg daily - the patient states that he was started on this medication in 2019 and discontinued it approximately 2-3 weeks ago.  The patient states that he was started on this medication by his PMHNP after she diagnosed him with bipolar II disorder.  The patient states that he felt like his mood was better when he was taking this medication, but states that he did not like that the medication made him feel more fatigued.   Strattera 10 mg daily - caused sexual side effects   Clonidine 0.1 mg nightly as needed for insomnia/anxiety - caused hypotension   Zoloft - ineffective/caused SI  Wellbutrin  mg twice daily - initially effective for depression/anxiety/ADHD, but later seems to have lost efficacy  Viibryd 10 mg daily - reports effective but discontinued due to cost        The following portions of the patient's history were reviewed and updated as appropriate: allergies, current medications, past family history, past medical history, past social history, past surgical history and problem list.    Review of Systems   Constitutional:  Negative for activity change, appetite change, fatigue and unexpected weight change.   Psychiatric/Behavioral:  Positive for dysphoric mood. Negative for agitation, behavioral problems, confusion, decreased concentration, hallucinations, self-injury, sleep disturbance and suicidal ideas. The patient is nervous/anxious. The patient is not hyperactive.        Objective   Physical Exam  Constitutional:       Appearance: Normal appearance.   Neurological:      Mental Status: He is alert.   Psychiatric:         Attention and Perception: Attention and perception normal.         Mood and Affect: Affect normal. Mood is anxious.         Speech: Speech normal.          Behavior: Behavior normal. Behavior is cooperative.         Thought Content: Thought content normal. Thought content does not include homicidal or suicidal ideation. Thought content does not include homicidal or suicidal plan.         Cognition and Memory: Cognition and memory normal.         Judgment: Judgment normal.       There were no vitals taken for this visit.    The patient was seen remotely today via a MyChart Video Visit through [facility].  Unable to obtain vital signs due to nature of remote visit.  Height stated at 67 inches.  Weight stated at 218 pounds.     Due to the nature of virtual visits and inability to monitor vital signs and weight with virtual visits, the patient has been encouraged to monitor their vital signs and weight regularly either through self-monitoring via home device(s) or with their Primary Care Provider, and the patient has been instructed to notify this APRN of any abnormalities or significant changes from baseline.        No Known Allergies    No results found for this or any previous visit (from the past 12 weeks).        Current Medications:   Current Outpatient Medications   Medication Sig Dispense Refill    buPROPion XL (WELLBUTRIN XL) 300 MG 24 hr tablet Take 1 tablet by mouth Daily. 90 tablet 0    METAMUCIL FIBER PO Take  by mouth.      propranolol (INDERAL) 10 MG tablet Take 1 tablet by mouth 2 (Two) Times a Day As Needed (anxiety). 60 tablet 0     No current facility-administered medications for this visit.       Mental Status Exam:   Hygiene:   good  Cooperation:  Cooperative  Eye Contact:  Good  Psychomotor Behavior:  Appropriate  Affect:  Full range  Hopelessness: Denies  Speech:  Normal  Thought Process:  Goal directed and Linear  Thought Content:  Normal  Suicidal:  None  Homicidal:  None  Hallucinations:  None  Delusion:  None  Memory:  Intact  Orientation:  Person, Place, Time and Situation  Reliability:  good  Insight:  Good  Judgement:  Good  Impulse Control:   Good  Physical/Medical Issues:  Yes See medical histry        PHQ-9 Depression Screening  Little interest or pleasure in doing things? (Patient-Rptd) Several days   Feeling down, depressed, or hopeless? (Patient-Rptd) Over half   PHQ-2 Total Score (Patient-Rptd) 3   Trouble falling or staying asleep, or sleeping too much? (Patient-Rptd) Several days   Feeling tired or having little energy? (Patient-Rptd) Over half   Poor appetite or overeating? (Patient-Rptd) Over half   Feeling bad about yourself - or that you are a failure or have let yourself or your family down? (Patient-Rptd) Several days   Trouble concentrating on things, such as reading the newspaper or watching television? (Patient-Rptd) Over half   Moving or speaking so slowly that other people could have noticed? Or the opposite - being so fidgety or restless that you have been moving around a lot more than usual? (Patient-Rptd) Several days   Thoughts that you would be better off dead, or of hurting yourself in some way? (Patient-Rptd) Not at all   PHQ-9 Total Score (Patient-Rptd) 12   If you checked off any problems, how difficult have these problems made it for you to do your work, take care of things at home, or get along with other people? (Patient-Rptd) Somewhat difficult         RADHA-7  Feeling nervous, anxious or on edge: (Patient-Rptd) Several days  Not being able to stop or control worrying: (Patient-Rptd) Several days  Worrying too much about different things: (Patient-Rptd) Not at all  Trouble Relaxing: (Patient-Rptd) More than half the days  Being so restless that it is hard to sit still: (Patient-Rptd) Several days  Feeling afraid as if something awful might happen: (Patient-Rptd) Several days  Becoming easily annoyed or irritable: (Patient-Rptd) More than half the days  RADHA 7 Total Score: (Patient-Rptd) 8  If you checked any problems, how difficult have these problems made it for you to do your work, take care of things at home, or get along  with other people: (Patient-Rptd) Very difficult        Assessment & Plan   Diagnoses and all orders for this visit:    1. Moderate episode of recurrent major depressive disorder (Primary)  -     buPROPion XL (WELLBUTRIN XL) 300 MG 24 hr tablet; Take 1 tablet by mouth Daily.  Dispense: 90 tablet; Refill: 0  -     Ambulatory Referral to Psychiatry    2. Generalized anxiety disorder  -     buPROPion XL (WELLBUTRIN XL) 300 MG 24 hr tablet; Take 1 tablet by mouth Daily.  Dispense: 90 tablet; Refill: 0  -     Ambulatory Referral to Psychiatry  -     propranolol (INDERAL) 10 MG tablet; Take 1 tablet by mouth 2 (Two) Times a Day As Needed (anxiety).  Dispense: 60 tablet; Refill: 0    3. ADHD, adult residual type  -     buPROPion XL (WELLBUTRIN XL) 300 MG 24 hr tablet; Take 1 tablet by mouth Daily.  Dispense: 90 tablet; Refill: 0                  Visit Diagnoses:    ICD-10-CM ICD-9-CM   1. Moderate episode of recurrent major depressive disorder  F33.1 296.32   2. Generalized anxiety disorder  F41.1 300.02   3. ADHD, adult residual type  F90.8 314.01                 GOALS:  Short Term Goals: Patient will be compliant with medication, and patient will have no significant medication related side effects.  Patient will be engaged in psychotherapy as indicated.  Patient will report subjective improvement of symptoms.  Long term goals: To stabilize mood and treat/improve subjective symptoms, the patient will stay out of the hospital, the patient will be at an optimal level of functioning, and the patient will take all medications as prescribed.  The patient verbalized understanding and agreement with goals that were mutually set.      SUICIDE RISK ASSESSMENT: Unalterable demographics and a history of mental health intervention indicate this patient is in a high risk category compared to the general population. At present, the patient denies active SI/HI, intentions, or plans at this time and agrees to seek immediate care should  such thoughts develop. The patient verbalizes understanding of how to access emergency care if needed and agrees to do so. Consideration of suicide risk and protective factors such as history, current presentation, individual strengths and weaknesses, psychosocial and environmental stressors and variables, psychiatric illness and symptoms, medical conditions and pain, took place in this interview. Based on those considerations, the patient is determined: within individual baseline and presenting no imminent risk for suicide or homicide. Other recommendations: The patient does not meet the criteria for inpatient admission and is not a safety risk to self or others at today's visit. Inpatient treatment offers no significant advantages over outpatient treatment for this patient at today's visit.      SAFETY PLAN:  Patient was given ample time for questions and fully participated in treatment planning.  Patient was encouraged to call the clinic with any questions or concerns.  Patient was informed of access to emergency care. If patient were to develop any significant symptomatology, suicidal ideation, homicidal ideation, any concerns, or feel unsafe at any time they are to call the clinic and if unable to get immediate assistance should immediately call 911 or go to the nearest emergency room.  The patient is advised to remove or secure (lock away) all lethal weapons (including guns) and sharps (including razors, scissors, knives, etc.).  All medications (including any prescribed and any over the counter medications) should be stored in a safe and secured location that is not obtainable by children/adolescents.  Patient was given an opportunity and encouraged to ask questions about their medication, illness, and treatment. Patient contracted verbally for the following: If you are experiencing an emotional crisis or have thoughts of harming yourself or others, please go to your nearest local emergency room or call 911.  Will continue to re-assess medication response and side effects frequently to establish efficacy and ensure safety. Risks, any black box warnings, side effects, off label usage, and benefits of medication and treatment discussed with patient, along with potential adverse side effects of current and/or newly prescribed medication, alternative treatment options, and OTC medications.  Patient verbalized understanding of potential risks, any off label use of medication, any black box warnings, and any side effects in their own words. The patient verbalized understanding and agreed to comply with the safety plan discussed in their own words.  Patient given the number to the office. Number also available to the 24- hour suicide hotline.       TREATMENT PLAN/GOALS: Continue medications and treatment plan as indicated. Treatment and medication options discussed during today's visit. Patient acknowledged and verbally consented to continue with current treatment plan and was educated on the importance of compliance with treatment and follow-up appointments.        -Begin Propranolol 10 mg twice daily as needed for anxiety   -Continue Wellbutrin  mg twice daily for ADHD and as adjunct for depression/anxiety  -Referral to establish with a new therapist via in-person services in the [place], [place] area placed at this time per patient's request due to current therapist being out of office for extended period of time with concern for potential [facility].  -Follow-up visit has been scheduled for approximately 4 weeks per patient request due to adding new medication to current medication regimen, but discussed with the patient that if he tolerates addition of propranolol to current medication regimen well and does not have any side effects and symptoms are well controlled we can push follow-up to 3 months as the patient has endorsed concern over finances related to high deductible health care plan.  Instructed patient to  contact the clinic to reschedule follow-up for 3 months from today's appointment if he is doing well and does not feel that 4-week follow-up is needed, but otherwise we will plan to follow up in approximately 4 weeks or sooner if needed.  The patient verbalizes understanding in his own words and endorses that he is agreeable to this.          MEDICATION ISSUES: Discussed medication options and treatment plan of prescribed medication, any off label use of medication, as well as the risks, benefits, any black box warnings including increased suicidality, and side effects including but not limited to potential falls, dizziness, possible impaired driving, GI side effects (change in appetite, abdominal discomfort, nausea, vomiting, diarrhea, and/or constipation), dry mouth, somnolence, sedation, insomnia, activation, agitation, irritation, tremors, abnormal muscle movements or disorders, headache, sweating, possible bruising or rare bleeding, electrolyte and/or fluid abnormalities, change in blood pressure/heart rate/and or heart rhythm, sexual dysfunction, and metabolic adversities among others. Patient and/or guardian agreeable to call the office with any worsening of symptoms or onset of side effects, or if any concerns or questions arise.  The contact information for the office is made available to the patient and/or guardian.  Patient and/or guardian agreeable to call 911 or go to the nearest ER should they begin having any SI/HI, or if any urgent concerns arise. No medication side effects or related complaints today.            VERBAL INFORMED CONSENT FOR MEDICATION:  The patient was educated that their proposed/prescribed psychotropic medication(s) has potential risks, side effects, adverse effects, and black box warnings; and these have been discussed with the patient.  The patient has been informed that their treatment and medication dosage is to be individualized, and may even be above or below the recommended  range/dosage due to patient individualization and response, but medication is prescribed using a shared decision making approach, and no medication or dosage will be prescribed without the patient's verbal consent.  The reason for the use of the medication including any off label use and alternative modes of treatment other than or in addition to medication has been considered and discussed, the probable consequences of not receiving the proposed treatment have been discussed, and any treatment side effects, black box warnings, and cautions associated with treatment have been discussed with the patient.  The patient is allowed ample time to openly discuss and ask questions regarding the proposed medication(s) and treatment plan and the patient verbalizes understanding the reasons for the use of the medication, its potential risks and benefits, other alternative treatment(s), and the probable consequences that may occur if the proposed medication is not given.  The patient has been given ample time to ask questions and study the information and find the information to be specific, accurate, and complete.  The patient gives verbal consent for the medication(s) proposed/prescribed, they verbalized understanding that they can refuse and withdraw consent at any time with the assistance of this APRN, and the patient has verbally confirmed that they are aware, and are willing, to take the prescribed medication and follow the treatment plan with the known possible risks, side effect, black box warnings, and any potential medication interactions, and the patient reports they will be worse off without this medication and treatment plan.  The patient is advised to contact this APRN/this office if any questions or concerns arise at any time (at [phone]), or call 911/go to the closest emergency department if needed or outside of office hours.          [facility] No Show Policy:  We understand  unexpected circumstances arise; however, anytime you miss your appointment we are unable to provide you appropriate care.  In addition, each appointment missed could have been used to provide care for others.  We ask that you call at least 24 hours in advance to cancel or reschedule an appointment.  We would like to take this opportunity to remind you of our policy stating patients who miss THREE or more appointments without cancelling or rescheduling 24 hours in advance of the appointment may be subject to cancellation of any further visits with our clinic and recommendation to seek in-person services/visits.    Please call [phone] to reschedule your appointment. If there are reasons that make it difficult for you to keep the appointments, please call and let us know how we can help.  Please understand that medication prescribing will not continue without seeing your provider.      [facility]'s No Show Policy reviewed with patient at today's visit. Patient verbalized understanding of this policy. Discussed with patient that in the event that there are three or more no show visits, it will be recommended that they pursue in-person services/visits as noncompliance with telehealth visits indicates that patient is not an appropriate candidate for telemedicine and would likely be more appropriate for in-person services/visits. Patient verbalizes understanding and is agreeable to this.           MEDS ORDERED DURING VISIT:  New Medications Ordered This Visit   Medications    buPROPion XL (WELLBUTRIN XL) 300 MG 24 hr tablet     Sig: Take 1 tablet by mouth Daily.     Dispense:  90 tablet     Refill:  0    propranolol (INDERAL) 10 MG tablet     Sig: Take 1 tablet by mouth 2 (Two) Times a Day As Needed (anxiety).     Dispense:  60 tablet     Refill:  0       Return in about 4 weeks (around 3/10/2025), or if symptoms worsen or fail to improve, for Recheck.        Patient will follow-up in 4 weeks,  highly encouraged the patient if he had any questions or concerns to contact the behavioral health [facility] clinic for sooner appointment patient verbalized understanding.      Functional Status: Moderate impairment     Prognosis: Good with Ongoing Treatment             This document has been electronically signed by ELMO Last  February 10, 2025 15:44 EST      Some of the data in this electronic note has been brought forward from a previous encounter, any necessary changes have been made, it has been reviewed by this APRN, and it is accurate.       Part of this note may be an electronic transcription/translation of spoken language to printed text using the Dragon Dictation System.

## 2025-02-17 ENCOUNTER — TELEPHONE (OUTPATIENT)
Dept: PSYCHIATRY | Facility: CLINIC | Age: 45
End: 2025-02-17
Payer: COMMERCIAL

## 2025-02-17 NOTE — TELEPHONE ENCOUNTER
[facility] pharmacy called to request refill on Wellbutrin XL. That medication was sent to [facility] in [place] for 90 day supply. I have contacted the patient and left a detailed voicemail making him aware. Also stated if he is wanting the rx to go to [facility] he should be able to contact pharmacy and that transferred. I also let the patient know he can call the office back if any questions.

## 2025-03-10 ENCOUNTER — TELEMEDICINE (OUTPATIENT)
Dept: PSYCHIATRY | Facility: CLINIC | Age: 45
End: 2025-03-10
Payer: COMMERCIAL

## 2025-03-10 DIAGNOSIS — F41.1 GENERALIZED ANXIETY DISORDER: Primary | Chronic | ICD-10-CM

## 2025-03-10 DIAGNOSIS — F33.1 MODERATE EPISODE OF RECURRENT MAJOR DEPRESSIVE DISORDER: Chronic | ICD-10-CM

## 2025-03-10 RX ORDER — PROPRANOLOL HYDROCHLORIDE 10 MG/1
10 TABLET ORAL 2 TIMES DAILY PRN
Qty: 180 TABLET | Refills: 0 | Status: SHIPPED | OUTPATIENT
Start: 2025-03-10

## 2025-03-10 RX ORDER — BUPROPION HYDROCHLORIDE 300 MG/1
300 TABLET ORAL DAILY
Qty: 90 TABLET | Refills: 0 | Status: SHIPPED | OUTPATIENT
Start: 2025-03-10

## 2025-03-10 NOTE — PROGRESS NOTES
This provider is located at home office working remotely through the [facility] (through [facility]), [address], [facility], [address] using a secure [employer] Video Visit through [employer]. Patient is being seen remotely via telehealth at their home address in [place], and stated they are in a secure environment for this session. The patient's condition being diagnosed/treated is appropriate for telemedicine. The provider identified herself as well as her credentials. The patient, and/or patients guardian, consent to be seen remotely, and when consent is given they understand that the consent allows for patient identifiable information to be sent to a third party as needed. They may refuse to be seen remotely at any time. The electronic data is encrypted and password protected, and the patient and/or guardian has been advised of the potential risks to privacy not withstanding such measures.    You have chosen to receive care through a telehealth visit.  Do you consent to use a video/audio connection for your medical care today? Yes     Patient identifiers utilized: Name and date of birth.    Patient verbally confirmed consent for today's encounter  03/10/2025 .    The patient does verbally confirm they are being seen today while physically located in the [facility].  This provider/this APRN is licensed in the [facility] where the patient is located/being seen.           Subjective   Robert Feliciano is a 44 y.o. male who is here today for medication management follow up.    Chief Complaint: Depression, anxiety    History of Present Illness:   -Last visit with this APRN: 02/10/2025  -Since last encounter with this APRN/Office: The patient reports that he has been doing pretty well over the past few weeks.  He states that he started taking the propranolol and overall he thinks he has been doing well with this.  He states that he is using this medication twice  "daily scheduled rather than on an as needed basis, and endorses he feels like this dosing schedule has been very beneficial for him.  He states that overall anxiety symptoms have been reduced and he feels he is better able to manage his anxiety symptoms and cope with stressors more easily.  He also states he does not think he is getting quite as agitated/irritated as easily as he used to.  He states that overall he thinks his mood has been a little better recently as a result.  He states that his sleep schedule has still been a little bit erratic his sleep has still been erratic recently, but states that this is just due to the nature of his job.  He states that overall he has been pretty pleased with how things have been going for him recently and how he has been feeling.  -Mood reported as: \"better\"  -Patient rates symptoms of depression at a 5/10 on a 0-10 scale, with 10 being the worst.   -Patient rates symptoms of anxiety at a 3/10 on a 0-10 scale, with 10 being the worst.    -Changes in medications or new medical problems/concerns since last visit: Denies  -Reported medication compliance: The patient reports compliance with their current psychotropic medication regimen.    -Reported medication side effects or concerns: Denies    -Auditory or visual hallucinations: Denies  -Behaviors different from patient baseline, or any reckless, impulsive, or risky behaviors: Denies  -Symptoms of aicha or psychosis: Denies  -Self-injurious behavior: Denies  -SI/HI: The patient adamantly denies any suicidal or homicidal ideations, plans, or intent at the time of this encounter and is convincing.  -No abnormal muscle movements or tics noted by this APRN during today's encounter, and the patient denies any of these symptoms.      -Using a shared decision-making approach the patient reports they would like to continue their current treatment/medication regimen without any adjustments/changes at this time.  When discussing " medication efficacy with the patient, and reassessing the need and appropriateness of continued psychotropic medication treatment and doses, they report they are pleased with management of symptoms at this time, and that their current treatment/medication regimen has continued to be effective for them and they do not want to make any changes or adjustments at today's encounter.    -The patient does verbally contract for safety at today's encounter and is in verbal agreement with the safety/crisis plan. The patient reports in their own words that they will reach out to this APRN/office prior to next scheduled appointment if there is any worsening of mood, any new psychiatric symptoms, any medication side effects or concerns, any concern for safety to self or others, any suicidal or homicidal ideations plans or intent, or any concerns, or they will call 911, call or text the suicide and crisis lifeline at [phone], or go to the closest emergency department.            Prior Psychiatric Medications:  Lamictal 200 mg daily - the patient states that he was started on this medication in 2019 and discontinued it approximately 2-3 weeks ago.  The patient states that he was started on this medication by his PMHNP after she diagnosed him with bipolar II disorder.  The patient states that he felt like his mood was better when he was taking this medication, but states that he did not like that the medication made him feel more fatigued.   Strattera 10 mg daily - caused sexual side effects   Clonidine 0.1 mg nightly as needed for insomnia/anxiety - caused hypotension   Zoloft - ineffective/caused SI  Wellbutrin  mg twice daily - initially effective for depression/anxiety/ADHD, but later seems to have lost efficacy  Viibryd 10 mg daily - reports effective but discontinued due to cost        The following portions of the patient's history were reviewed and updated as appropriate: allergies, current medications, past family  history, past medical history, past social history, past surgical history and problem list.    Review of Systems   Constitutional:  Negative for activity change, appetite change, fatigue and unexpected weight change.   Psychiatric/Behavioral:  Positive for dysphoric mood and sleep disturbance (r/t work schedule). Negative for agitation, behavioral problems, confusion, decreased concentration, hallucinations, self-injury and suicidal ideas. The patient is nervous/anxious. The patient is not hyperactive.        Objective   Physical Exam  Constitutional:       Appearance: Normal appearance.   Neurological:      Mental Status: He is alert.   Psychiatric:         Attention and Perception: Attention and perception normal.         Mood and Affect: Mood and affect normal.         Speech: Speech normal.         Behavior: Behavior normal. Behavior is cooperative.         Thought Content: Thought content normal. Thought content does not include homicidal or suicidal ideation. Thought content does not include homicidal or suicidal plan.         Cognition and Memory: Cognition and memory normal.         Judgment: Judgment normal.       There were no vitals taken for this visit.    The patient was seen remotely today via a MyChart Video Visit through [facility].  Unable to obtain vital signs due to nature of remote visit.  Height stated at 67 inches.  Weight stated at 218 pounds.     Due to the nature of virtual visits and inability to monitor vital signs and weight with virtual visits, the patient has been encouraged to monitor their vital signs and weight regularly either through self-monitoring via home device(s) or with their Primary Care Provider, and the patient has been instructed to notify this APRN of any abnormalities or significant changes from baseline.        No Known Allergies    No results found for this or any previous visit (from the past 12 weeks).        Current Medications:   Current Outpatient Medications    Medication Sig Dispense Refill    buPROPion XL (WELLBUTRIN XL) 300 MG 24 hr tablet Take 1 tablet by mouth Daily. 90 tablet 0    METAMUCIL FIBER PO Take  by mouth.      propranolol (INDERAL) 10 MG tablet Take 1 tablet by mouth 2 (Two) Times a Day As Needed (anxiety). 180 tablet 0     No current facility-administered medications for this visit.       Mental Status Exam:   Hygiene:   good  Cooperation:  Cooperative  Eye Contact:  Good  Psychomotor Behavior:  Appropriate  Affect:  Full range  Hopelessness: Denies  Speech:  Normal  Thought Process:  Goal directed and Linear  Thought Content:  Normal  Suicidal:  None  Homicidal:  None  Hallucinations:  None  Delusion:  None  Memory:  Intact  Orientation:  Person, Place, Time and Situation  Reliability:  good  Insight:  Good  Judgement:  Good  Impulse Control:  Good  Physical/Medical Issues:  Yes See medical histry        PHQ-9 Depression Screening  Little interest or pleasure in doing things? (Patient-Rptd) Several days   Feeling down, depressed, or hopeless? (Patient-Rptd) Several days   PHQ-2 Total Score (Patient-Rptd) 2   Trouble falling or staying asleep, or sleeping too much? (Patient-Rptd) Several days   Feeling tired or having little energy? (Patient-Rptd) Over half   Poor appetite or overeating? (Patient-Rptd) Several days   Feeling bad about yourself - or that you are a failure or have let yourself or your family down? (Patient-Rptd) Several days   Trouble concentrating on things, such as reading the newspaper or watching television? (Patient-Rptd) Several days   Moving or speaking so slowly that other people could have noticed? Or the opposite - being so fidgety or restless that you have been moving around a lot more than usual? (Patient-Rptd) Not at all   Thoughts that you would be better off dead, or of hurting yourself in some way? (Patient-Rptd) Not at all   PHQ-9 Total Score (Patient-Rptd) 8   If you checked off any problems, how difficult have these  problems made it for you to do your work, take care of things at home, or get along with other people? (Patient-Rptd) Somewhat difficult         RADHA-7  Feeling nervous, anxious or on edge: (Patient-Rptd) Several days  Not being able to stop or control worrying: (Patient-Rptd) Several days  Worrying too much about different things: (Patient-Rptd) More than half the days  Trouble Relaxing: (Patient-Rptd) More than half the days  Being so restless that it is hard to sit still: (Patient-Rptd) Not at all  Feeling afraid as if something awful might happen: (Patient-Rptd) Not at all  Becoming easily annoyed or irritable: (Patient-Rptd) Several days  RADHA 7 Total Score: (Patient-Rptd) 7  If you checked any problems, how difficult have these problems made it for you to do your work, take care of things at home, or get along with other people: (Patient-Rptd) Not difficult at all        Assessment & Plan   Diagnoses and all orders for this visit:    1. Generalized anxiety disorder (Primary)  -     propranolol (INDERAL) 10 MG tablet; Take 1 tablet by mouth 2 (Two) Times a Day As Needed (anxiety).  Dispense: 180 tablet; Refill: 0  -     buPROPion XL (WELLBUTRIN XL) 300 MG 24 hr tablet; Take 1 tablet by mouth Daily.  Dispense: 90 tablet; Refill: 0    2. Moderate episode of recurrent major depressive disorder  -     buPROPion XL (WELLBUTRIN XL) 300 MG 24 hr tablet; Take 1 tablet by mouth Daily.  Dispense: 90 tablet; Refill: 0                    Visit Diagnoses:    ICD-10-CM ICD-9-CM   1. Generalized anxiety disorder  F41.1 300.02   2. Moderate episode of recurrent major depressive disorder  F33.1 296.32                   GOALS:  Short Term Goals: Patient will be compliant with medication, and patient will have no significant medication related side effects.  Patient will be engaged in psychotherapy as indicated.  Patient will report subjective improvement of symptoms.  Long term goals: To stabilize mood and treat/improve subjective  symptoms, the patient will stay out of the hospital, the patient will be at an optimal level of functioning, and the patient will take all medications as prescribed.  The patient verbalized understanding and agreement with goals that were mutually set.      SUICIDE RISK ASSESSMENT: Unalterable demographics and a history of mental health intervention indicate this patient is in a high risk category compared to the general population. At present, the patient denies active SI/HI, intentions, or plans at this time and agrees to seek immediate care should such thoughts develop. The patient verbalizes understanding of how to access emergency care if needed and agrees to do so. Consideration of suicide risk and protective factors such as history, current presentation, individual strengths and weaknesses, psychosocial and environmental stressors and variables, psychiatric illness and symptoms, medical conditions and pain, took place in this interview. Based on those considerations, the patient is determined: within individual baseline and presenting no imminent risk for suicide or homicide. Other recommendations: The patient does not meet the criteria for inpatient admission and is not a safety risk to self or others at today's visit. Inpatient treatment offers no significant advantages over outpatient treatment for this patient at today's visit.      SAFETY PLAN:  Patient was given ample time for questions and fully participated in treatment planning.  Patient was encouraged to call the clinic with any questions or concerns.  Patient was informed of access to emergency care. If patient were to develop any significant symptomatology, suicidal ideation, homicidal ideation, any concerns, or feel unsafe at any time they are to call the clinic and if unable to get immediate assistance should immediately call 911 or go to the nearest emergency room.  The patient is advised to remove or secure (lock away) all lethal weapons  (including guns) and sharps (including razors, scissors, knives, etc.).  All medications (including any prescribed and any over the counter medications) should be stored in a safe and secured location that is not obtainable by children/adolescents.  Patient was given an opportunity and encouraged to ask questions about their medication, illness, and treatment. Patient contracted verbally for the following: If you are experiencing an emotional crisis or have thoughts of harming yourself or others, please go to your nearest local emergency room or call 911. Will continue to re-assess medication response and side effects frequently to establish efficacy and ensure safety. Risks, any black box warnings, side effects, off label usage, and benefits of medication and treatment discussed with patient, along with potential adverse side effects of current and/or newly prescribed medication, alternative treatment options, and OTC medications.  Patient verbalized understanding of potential risks, any off label use of medication, any black box warnings, and any side effects in their own words. The patient verbalized understanding and agreed to comply with the safety plan discussed in their own words.  Patient given the number to the office. Number also available to the 24- hour suicide hotline.       TREATMENT PLAN/GOALS: Continue medications and treatment plan as indicated. Treatment and medication options discussed during today's visit. Patient acknowledged and verbally consented to continue with current treatment plan and was educated on the importance of compliance with treatment and follow-up appointments.        -Continue Wellbutrin  mg twice daily for depression/anxiety  -Continue Propranolol 10 mg twice daily as needed for anxiety         MEDICATION ISSUES: Discussed medication options and treatment plan of prescribed medication, any off label use of medication, as well as the risks, benefits, any black box  warnings including increased suicidality, and side effects including but not limited to potential falls, dizziness, possible impaired driving, GI side effects (change in appetite, abdominal discomfort, nausea, vomiting, diarrhea, and/or constipation), dry mouth, somnolence, sedation, insomnia, activation, agitation, irritation, tremors, abnormal muscle movements or disorders, headache, sweating, possible bruising or rare bleeding, electrolyte and/or fluid abnormalities, change in blood pressure/heart rate/and or heart rhythm, sexual dysfunction, and metabolic adversities among others. Patient and/or guardian agreeable to call the office with any worsening of symptoms or onset of side effects, or if any concerns or questions arise.  The contact information for the office is made available to the patient and/or guardian.  Patient and/or guardian agreeable to call 911 or go to the nearest ER should they begin having any SI/HI, or if any urgent concerns arise. No medication side effects or related complaints today.            VERBAL INFORMED CONSENT FOR MEDICATION:  The patient was educated that their proposed/prescribed psychotropic medication(s) has potential risks, side effects, adverse effects, and black box warnings; and these have been discussed with the patient.  The patient has been informed that their treatment and medication dosage is to be individualized, and may even be above or below the recommended range/dosage due to patient individualization and response, but medication is prescribed using a shared decision making approach, and no medication or dosage will be prescribed without the patient's verbal consent.  The reason for the use of the medication including any off label use and alternative modes of treatment other than or in addition to medication has been considered and discussed, the probable consequences of not receiving the proposed treatment have been discussed, and any treatment side effects,  black box warnings, and cautions associated with treatment have been discussed with the patient.  The patient is allowed ample time to openly discuss and ask questions regarding the proposed medication(s) and treatment plan and the patient verbalizes understanding the reasons for the use of the medication, its potential risks and benefits, other alternative treatment(s), and the probable consequences that may occur if the proposed medication is not given.  The patient has been given ample time to ask questions and study the information and find the information to be specific, accurate, and complete.  The patient gives verbal consent for the medication(s) proposed/prescribed, they verbalized understanding that they can refuse and withdraw consent at any time with the assistance of this APRN, and the patient has verbally confirmed that they are aware, and are willing, to take the prescribed medication and follow the treatment plan with the known possible risks, side effect, black box warnings, and any potential medication interactions, and the patient reports they will be worse off without this medication and treatment plan.  The patient is advised to contact this APRN/this office if any questions or concerns arise at any time (at [phone]), or call 911/go to the closest emergency department if needed or outside of office hours.          [facility] No Show Policy:  We understand unexpected circumstances arise; however, anytime you miss your appointment we are unable to provide you appropriate care.  In addition, each appointment missed could have been used to provide care for others.  We ask that you call at least 24 hours in advance to cancel or reschedule an appointment.  We would like to take this opportunity to remind you of our policy stating patients who miss THREE or more appointments without cancelling or rescheduling 24 hours in advance of the appointment may be subject to  cancellation of any further visits with our clinic and recommendation to seek in-person services/visits.    Please call [phone] to reschedule your appointment. If there are reasons that make it difficult for you to keep the appointments, please call and let us know how we can help.  Please understand that medication prescribing will not continue without seeing your provider.      [facility]'s No Show Policy reviewed with patient at today's visit. Patient verbalized understanding of this policy. Discussed with patient that in the event that there are three or more no show visits, it will be recommended that they pursue in-person services/visits as noncompliance with telehealth visits indicates that patient is not an appropriate candidate for telemedicine and would likely be more appropriate for in-person services/visits. Patient verbalizes understanding and is agreeable to this.           MEDS ORDERED DURING VISIT:  New Medications Ordered This Visit   Medications    propranolol (INDERAL) 10 MG tablet     Sig: Take 1 tablet by mouth 2 (Two) Times a Day As Needed (anxiety).     Dispense:  180 tablet     Refill:  0    buPROPion XL (WELLBUTRIN XL) 300 MG 24 hr tablet     Sig: Take 1 tablet by mouth Daily.     Dispense:  90 tablet     Refill:  0       Return in about 3 months (around 6/10/2025), or if symptoms worsen or fail to improve, for Recheck.        The patient will follow-up in approximately 3 months, and follow-up appointment has been scheduled with the patient confirming agreeability/availability for date/time during today's encounter.  Instructed the patient to contact the [facility] if they have any questions or concerns, and that a sooner appointment will be provided if needed.  The patient verbalized understanding in their own words and endorsed that they are agreeable to this.        Functional Status: Mild impairment     Prognosis: Good  with Ongoing Treatment             This document has been electronically signed by ELMO Last  March 10, 2025 15:48 EDT      Some of the data in this electronic note has been brought forward from a previous encounter, any necessary changes have been made, it has been reviewed by this APRN, and it is accurate.       Part of this note may be an electronic transcription/translation of spoken language to printed text using the Dragon Dictation System.

## 2025-03-19 ENCOUNTER — TELEPHONE (OUTPATIENT)
Dept: PSYCHIATRY | Facility: CLINIC | Age: 45
End: 2025-03-19
Payer: COMMERCIAL

## 2025-05-07 ENCOUNTER — TELEMEDICINE (OUTPATIENT)
Dept: FAMILY MEDICINE CLINIC | Facility: CLINIC | Age: 45
End: 2025-05-07
Payer: COMMERCIAL

## 2025-05-07 DIAGNOSIS — B35.4 TINEA CORPORIS: Primary | ICD-10-CM

## 2025-05-07 PROCEDURE — 99213 OFFICE O/P EST LOW 20 MIN: CPT | Performed by: FAMILY MEDICINE

## 2025-05-07 RX ORDER — PRENATAL VIT 91/IRON/FOLIC/DHA 28-975-200
1 COMBINATION PACKAGE (EA) ORAL 2 TIMES DAILY
Qty: 42 G | Refills: 0 | Status: SHIPPED | OUTPATIENT
Start: 2025-05-07

## 2025-05-07 NOTE — ASSESSMENT & PLAN NOTE
Difficult to fully assess rash due to video quality but I suspect this is tinea corporis.  Patient will treat with topical terbinafine.  He was encouraged to keep his appointment next week to have rash reassessed in person.  RTC/ED precautions given.

## 2025-05-07 NOTE — PROGRESS NOTES
Robert Feliciano is a 44 y.o. male who presents today for Rash    You have chosen to receive care through a telemedicine visit. Do you consent to use a telemedicine visit for your medical care today? Yes.  Patient is in his home in [place] and I am in my office in [place].      Rash  Chronicity:  New  Onset:  More than 1 month ago (Been present for about 3 months around right waist line)  Progression since onset:  Unchanged (initially got larger and now hasnt changed)  Affected locations:  Abdomen  Characteristics:  Redness, itchiness and scaling  Exposed to:  Nothing  Associated symptoms: no anorexia, no congestion, no cough, no diarrhea, no pain, no facial edema, no fatigue, no fever, no joint pain, no nail changes, no rhinorrhea, no shortness of breath, no sore throat and no vomiting    Treatments tried: alcohol, diclofenac gel, gold bond powder, hydrocortisone cream.  Improvement on treatment:  No relief  PMH includes: allergies (seasonal) and varicella (chicken pox when he was 4)    PMH includes: no asthma and no eczema         Review of Systems   Constitutional:  Negative for fatigue, fever and unexpected weight loss.   HENT:  Negative for congestion, ear pain, rhinorrhea and sore throat.    Eyes:  Negative for pain and visual disturbance.   Respiratory:  Negative for cough, shortness of breath and wheezing.    Cardiovascular:  Negative for chest pain and palpitations.   Gastrointestinal:  Negative for abdominal pain, anorexia, blood in stool, constipation, diarrhea, nausea, vomiting and GERD.   Endocrine: Negative for polydipsia and polyuria.   Genitourinary:  Negative for difficulty urinating.   Musculoskeletal:  Negative for joint pain and joint swelling.   Skin:  Positive for rash. Negative for nail changes and skin lesions.   Allergic/Immunologic: Negative for environmental allergies.   Neurological:  Negative for seizures and syncope.   Hematological:  Does not bruise/bleed easily.    Psychiatric/Behavioral:  Negative for suicidal ideas.         The following portions of the patient's history were reviewed and updated as appropriate: allergies, current medications, past family history, past medical history, past social history, past surgical history and problem list.    Current Outpatient Medications on File Prior to Visit   Medication Sig Dispense Refill    buPROPion XL (WELLBUTRIN XL) 300 MG 24 hr tablet Take 1 tablet by mouth Daily. 90 tablet 0    METAMUCIL FIBER PO Take  by mouth.      propranolol (INDERAL) 10 MG tablet Take 1 tablet by mouth 2 (Two) Times a Day As Needed (anxiety). 180 tablet 0     No current facility-administered medications on file prior to visit.       No Known Allergies     There were no vitals taken for this visit.     Physical Exam  Constitutional:       General: He is not in acute distress.     Appearance: Normal appearance. He is not ill-appearing, toxic-appearing or diaphoretic.   Pulmonary:      Effort: Pulmonary effort is normal. No respiratory distress.   Skin:     Findings: Rash present. Rash is papular.             Comments: Papulosquamous rash that is approx 10cm in diameter with redness and scaling at the borders and central clearing. No drainage, tenderness, or heat to touch.    Neurological:      General: No focal deficit present.      Mental Status: He is alert. Mental status is at baseline.   Psychiatric:         Mood and Affect: Mood normal.         Behavior: Behavior normal.          Results for orders placed or performed in visit on 10/24/24   Comprehensive Metabolic Panel    Collection Time: 10/24/24  8:58 AM    Specimen: Blood   Result Value Ref Range    Glucose 109 (H) 65 - 99 mg/dL    BUN 21 (H) 6 - 20 mg/dL    Creatinine 1.29 (H) 0.76 - 1.27 mg/dL    Sodium 141 136 - 145 mmol/L    Potassium 4.1 3.5 - 5.2 mmol/L    Chloride 101 98 - 107 mmol/L    CO2 24.0 22.0 - 29.0 mmol/L    Calcium 9.4 8.6 - 10.5 mg/dL    Total Protein 7.3 6.0 - 8.5 g/dL     Albumin 4.2 3.5 - 5.2 g/dL    ALT (SGPT) 18 1 - 41 U/L    AST (SGOT) 19 1 - 40 U/L    Alkaline Phosphatase 76 39 - 117 U/L    Total Bilirubin 0.4 0.0 - 1.2 mg/dL    Globulin 3.1 gm/dL    A/G Ratio 1.4 g/dL    BUN/Creatinine Ratio 16.3 7.0 - 25.0    Anion Gap 16.0 (H) 5.0 - 15.0 mmol/L    eGFR 69.3 >60.0 mL/min/1.73   Lipid Panel    Collection Time: 10/24/24  8:58 AM    Specimen: Blood   Result Value Ref Range    Total Cholesterol 121 0 - 200 mg/dL    Triglycerides 66 0 - 150 mg/dL    HDL Cholesterol 46 40 - 60 mg/dL    LDL Cholesterol  61 0 - 100 mg/dL    VLDL Cholesterol 14 5 - 40 mg/dL    LDL/HDL Ratio 1.34    PSA Screen    Collection Time: 10/24/24  8:58 AM    Specimen: Blood   Result Value Ref Range    PSA 1.020 0.000 - 4.000 ng/mL   Hemoglobin A1c    Collection Time: 10/24/24  8:58 AM    Specimen: Blood   Result Value Ref Range    Hemoglobin A1C 5.40 4.80 - 5.60 %   Testosterone    Collection Time: 10/24/24  8:58 AM    Specimen: Blood   Result Value Ref Range    Testosterone, Total 595.00 249.00 - 836.00 ng/dL   CBC Auto Differential    Collection Time: 10/24/24  8:58 AM    Specimen: Blood   Result Value Ref Range    WBC 5.05 3.40 - 10.80 10*3/mm3    RBC 5.54 4.14 - 5.80 10*6/mm3    Hemoglobin 16.3 13.0 - 17.7 g/dL    Hematocrit 48.3 37.5 - 51.0 %    MCV 87.2 79.0 - 97.0 fL    MCH 29.4 26.6 - 33.0 pg    MCHC 33.7 31.5 - 35.7 g/dL    RDW 12.6 12.3 - 15.4 %    RDW-SD 40.0 37.0 - 54.0 fl    MPV 9.2 6.0 - 12.0 fL    Platelets 216 140 - 450 10*3/mm3    Neutrophil % 59.2 42.7 - 76.0 %    Lymphocyte % 31.9 19.6 - 45.3 %    Monocyte % 6.3 5.0 - 12.0 %    Eosinophil % 1.6 0.3 - 6.2 %    Basophil % 0.6 0.0 - 1.5 %    Immature Grans % 0.4 0.0 - 0.5 %    Neutrophils, Absolute 2.99 1.70 - 7.00 10*3/mm3    Lymphocytes, Absolute 1.61 0.70 - 3.10 10*3/mm3    Monocytes, Absolute 0.32 0.10 - 0.90 10*3/mm3    Eosinophils, Absolute 0.08 0.00 - 0.40 10*3/mm3    Basophils, Absolute 0.03 0.00 - 0.20 10*3/mm3    Immature Grans,  Absolute 0.02 0.00 - 0.05 10*3/mm3    nRBC 0.0 0.0 - 0.2 /100 WBC        Problems Addressed this Visit          Skin    Tinea corporis - Primary    Difficult to fully assess rash due to video quality but I suspect this is tinea corporis.  Patient will treat with topical terbinafine.  He was encouraged to keep his appointment next week to have rash reassessed in person.  RTC/ED precautions given.         Relevant Medications    terbinafine (lamISIL) 1 % cream     Diagnoses         Codes Comments      Tinea corporis    -  Primary ICD-10-CM: B35.4  ICD-9-CM: 110.5             Return if symptoms worsen or fail to improve.    This was an audio and video enabled telemedicine encounter.    Mike Restrepo MD   5/7/2025

## 2025-06-09 ENCOUNTER — TELEMEDICINE (OUTPATIENT)
Dept: PSYCHIATRY | Facility: CLINIC | Age: 45
End: 2025-06-09
Payer: COMMERCIAL

## 2025-06-09 DIAGNOSIS — F33.1 MODERATE EPISODE OF RECURRENT MAJOR DEPRESSIVE DISORDER: Chronic | ICD-10-CM

## 2025-06-09 DIAGNOSIS — F41.1 GENERALIZED ANXIETY DISORDER: Primary | Chronic | ICD-10-CM

## 2025-06-09 PROCEDURE — 99214 OFFICE O/P EST MOD 30 MIN: CPT | Performed by: NURSE PRACTITIONER

## 2025-06-09 PROCEDURE — 96127 BRIEF EMOTIONAL/BEHAV ASSMT: CPT | Performed by: NURSE PRACTITIONER

## 2025-06-09 RX ORDER — BUPROPION HYDROCHLORIDE 300 MG/1
300 TABLET ORAL DAILY
Qty: 90 TABLET | Refills: 0 | Status: SHIPPED | OUTPATIENT
Start: 2025-06-09

## 2025-06-09 RX ORDER — PROPRANOLOL HYDROCHLORIDE 10 MG/1
10 TABLET ORAL 3 TIMES DAILY PRN
Qty: 270 TABLET | Refills: 0 | Status: SHIPPED | OUTPATIENT
Start: 2025-06-09

## 2025-06-09 NOTE — PROGRESS NOTES
This provider is located at home office working remotely through the [facility] (through [facility]), [address], [facility], [address] using a secure [employer] Video Visit through [employer]. Patient is being seen remotely via telehealth at their home address in [place], and stated they are in a secure environment for this session. The patient's condition being diagnosed/treated is appropriate for telemedicine. The provider identified herself as well as her credentials. The patient, and/or patients guardian, consent to be seen remotely, and when consent is given they understand that the consent allows for patient identifiable information to be sent to a third party as needed. They may refuse to be seen remotely at any time. The electronic data is encrypted and password protected, and the patient and/or guardian has been advised of the potential risks to privacy not withstanding such measures.    You have chosen to receive care through a telehealth visit.  Do you consent to use a video/audio connection for your medical care today? Yes     Patient identifiers utilized: Name and date of birth.    Patient verbally confirmed consent for today's encounter 06/09/2025.    The patient does verbally confirm they are being seen today while physically located in the [facility].  This provider/this APRN is licensed in the [facility] where the patient is located/being seen.           Subjective   Robert Feliciano is a 44 y.o. male who is here today for medication management follow up.    Chief Complaint: Depression, anxiety    Accompanied by: Self - no one else besides the patient present at today's encounter       History of Present Illness:   -Last visit with this APRN: 03/10/2025  -Since last encounter with this APRN/Office: The patient reports that things have been a little crazy for him recently, but states that he is hopeful that they are about to get better.  He states  "that he still been working the shift that begins at 3 AM.  He states that he feels that he has been sleep deprived for the past year due to working that shift.  He states that he will start a new shift tomorrow in which she will be working from 5 AM to 2 PM.  He states that he is hopeful that changing his shifts will be beneficial for him and help him to feel better.  He states that the past year he just feels that he has been on a fog and felt miserable due to feelings of sleep deprived all of the time.  He states that his energy levels and his mental health have been significantly impacted since working that shift.  He states that he is really optimistic that things will improve for him once he changes shifts.  He endorses that he feels his medication regimen has been helpful for him given the circumstances, and states that he is eager to see how things improve and if symptoms become more manageable once he changes shifts.    -Appetite reported as: \"good\"  -Sleep reported as: \"not great\".  Reports that his sleep schedule has continued to be really erratic.  He states that he is sleeping an appropriate amount, around 7 hours at a time, but states that the times that he is sleeping he feels is inappropriate and has not been very restorative for him.  He states for example but he is still working that shift that starts at 3 AM.  He states that this resulted in him having to get up around 2:30 AM, so states that he thinks that this shift has been really difficult for him and contributed to significant amount of fatigue despite sleeping an appropriate amount.  He states that he is hoping that sleep quality will improve once his shift changes to what he feels is a more appropriate schedule.  Denies any nightmares or bad dreams.    -Changes in medications or new medical problems/concerns since last visit: Denies  -Reported medication compliance: The patient reports that there have been instances where he has used the " propranolol up to 3 times daily as needed for anxiety, but states that most of the time he uses it 1-2 times per day as needed.  Reports he has tolerated using it up to 3 times daily as needed well and denies any side effects with this, and states that overall he seems to be helpful when used.  The patient otherwise reports compliance with their current psychotropic medication regimen.    -Reported medication side effects or concerns: Denies    -Auditory or visual hallucinations: Denies  -Behaviors different from patient baseline, or any reckless, impulsive, or risky behaviors: Denies  -Symptoms of aicha or psychosis: Denies  -Self-injurious behavior: Denies  -SI/HI: The patient adamantly denies any suicidal or homicidal ideations, plans, or intent at the time of this encounter and is convincing.  -No abnormal muscle movements or tics noted by this APRN during today's encounter, and the patient denies any of these symptoms.      -Using a shared decision-making approach the patient reports they would like to continue their current treatment/medication regimen without any adjustments/changes at this time.  When discussing medication efficacy with the patient, and reassessing the need and appropriateness of continued psychotropic medication treatment and doses, they report they are pleased with management of symptoms at this time, and that their current treatment/medication regimen has continued to be effective for them and they do not want to make any changes or adjustments at today's encounter.    -The patient does verbally contract for safety at today's encounter and is in verbal agreement with the safety/crisis plan. The patient reports in their own words that they will reach out to this APRN/office prior to next scheduled appointment if there is any worsening of mood, any new psychiatric symptoms, any medication side effects or concerns, any concern for safety to self or others, any suicidal or homicidal ideations  plans or intent, or any concerns, or they will call 911, call or text the suicide and crisis lifeline at [phone], or go to the closest emergency department.        A little crazy but about to get better -   Start new shift hours tomorrow - 5-2  Had been miserable for about a year with those shift hours -   Was sleeping appropriate amount - just not good quality because of shift/times  Really affected energy levels - sleep schedule was very erratic   Feeling like been in a fog the past year - impacted mental health   Hoping this should improve with changing shifts - optimistic about this                 Prior Psychiatric Medications:  Lamictal 200 mg daily - the patient states that he was started on this medication in 2019 and discontinued it approximately 2-3 weeks ago.  The patient states that he was started on this medication by his PMHNP after she diagnosed him with bipolar II disorder.  The patient states that he felt like his mood was better when he was taking this medication, but states that he did not like that the medication made him feel more fatigued.   Strattera 10 mg daily - caused sexual side effects   Clonidine 0.1 mg nightly as needed for insomnia/anxiety - caused hypotension   Zoloft - ineffective/caused SI  Wellbutrin  mg twice daily - initially effective for depression/anxiety/ADHD, but later seems to have lost efficacy  Viibryd 10 mg daily - reports effective but discontinued due to cost        The following portions of the patient's history were reviewed and updated as appropriate: allergies, current medications, past family history, past medical history, past social history, past surgical history and problem list.    Review of Systems   Constitutional:  Positive for activity change and fatigue. Negative for appetite change and unexpected weight change.   Psychiatric/Behavioral:  Positive for dysphoric mood and sleep disturbance (r/t work schedule). Negative for agitation, behavioral problems,  confusion, decreased concentration, hallucinations, self-injury and suicidal ideas. The patient is nervous/anxious. The patient is not hyperactive.        Objective   Physical Exam  Constitutional:       Appearance: Normal appearance.   Neurological:      Mental Status: He is alert.   Psychiatric:         Attention and Perception: Attention and perception normal.         Mood and Affect: Affect normal. Mood is anxious and depressed.         Speech: Speech normal.         Behavior: Behavior normal. Behavior is cooperative.         Thought Content: Thought content normal. Thought content does not include homicidal or suicidal ideation. Thought content does not include homicidal or suicidal plan.         Cognition and Memory: Cognition and memory normal.         Judgment: Judgment normal.       There were no vitals taken for this visit.    The patient was seen remotely today via a MyChart Video Visit through [facility].  Unable to obtain vital signs due to nature of remote visit.  Height stated at 67 inches.  Weight stated at 218 pounds.         No Known Allergies    No results found for this or any previous visit (from the past 12 weeks).        Current Medications:   Current Outpatient Medications   Medication Sig Dispense Refill    buPROPion XL (WELLBUTRIN XL) 300 MG 24 hr tablet Take 1 tablet by mouth Daily. 90 tablet 0    propranolol (INDERAL) 10 MG tablet Take 1 tablet by mouth 3 (Three) Times a Day As Needed (anxiety). 270 tablet 0     No current facility-administered medications for this visit.       Mental Status Exam:   Hygiene:   good  Cooperation:  Cooperative  Eye Contact:  Good  Psychomotor Behavior:  Appropriate  Affect:  Full range  Hopelessness: Denies  Speech:  Normal  Thought Process:  Goal directed and Linear  Thought Content:  Normal  Suicidal:  None  Homicidal:  None  Hallucinations:  None  Delusion:  None  Memory:  Intact  Orientation:  Person, Place, Time and Situation  Reliability:  good  Insight:   Good  Judgement:  Good  Impulse Control:  Good  Physical/Medical Issues:  Yes See medical histry        PHQ-9 Depression Screening  Little interest or pleasure in doing things? (Patient-Rptd) Several days   Feeling down, depressed, or hopeless? (Patient-Rptd) Several days   PHQ-2 Total Score (Patient-Rptd) 2   Trouble falling or staying asleep, or sleeping too much? (Patient-Rptd) Over half   Feeling tired or having little energy? (Patient-Rptd) Over half   Poor appetite or overeating? (Patient-Rptd) Over half   Feeling bad about yourself - or that you are a failure or have let yourself or your family down? (Patient-Rptd) Several days   Trouble concentrating on things, such as reading the newspaper or watching television?     Moving or speaking so slowly that other people could have noticed? Or the opposite - being so fidgety or restless that you have been moving around a lot more than usual?     Thoughts that you would be better off dead, or of hurting yourself in some way?     PHQ-9 Total Score     If you checked off any problems, how difficult have these problems made it for you to do your work, take care of things at home, or get along with other people?           RADHA-7         The patient declined/did not complete the virtual PHQ-9 and RADHA-7 for today's encounter.        Assessment & Plan   Diagnoses and all orders for this visit:    1. Generalized anxiety disorder (Primary)  -     buPROPion XL (WELLBUTRIN XL) 300 MG 24 hr tablet; Take 1 tablet by mouth Daily.  Dispense: 90 tablet; Refill: 0  -     propranolol (INDERAL) 10 MG tablet; Take 1 tablet by mouth 3 (Three) Times a Day As Needed (anxiety).  Dispense: 270 tablet; Refill: 0    2. Moderate episode of recurrent major depressive disorder  -     buPROPion XL (WELLBUTRIN XL) 300 MG 24 hr tablet; Take 1 tablet by mouth Daily.  Dispense: 90 tablet; Refill: 0                      Visit Diagnoses:    ICD-10-CM ICD-9-CM   1. Generalized anxiety disorder  F41.1  300.02   2. Moderate episode of recurrent major depressive disorder  F33.1 296.32                     GOALS:  Short Term Goals: Patient will be compliant with medication, and patient will have no significant medication related side effects.  Patient will be engaged in psychotherapy as indicated.  Patient will report subjective improvement of symptoms.  Long term goals: To stabilize mood and treat/improve subjective symptoms, the patient will stay out of the hospital, the patient will be at an optimal level of functioning, and the patient will take all medications as prescribed.  The patient verbalized understanding and agreement with goals that were mutually set.        TREATMENT PLAN/GOALS: Continue medications and treatment plan as indicated. Treatment and medication options discussed during today's visit. Patient acknowledged and verbally consented to continue with current treatment plan and was educated on the importance of compliance with treatment and follow-up appointments.        - Increase Propranolol to 10 mg three times daily as needed for anxiety  - Continue Wellbutrin  mg twice daily for depression/anxiety        MEDICATION ISSUES: MEDICATION ISSUES: Discussed treatment plan and medication options of prescribed medication, including any off label use of medication, as well as the risks, benefits, black box warnings, and side effects including sedation or drowsiness and potential falls, possible impaired driving, worsened or change in mood, suicidal and or homicidal ideations, changes in weight, and metabolic adversities among others. Patient is agreeable to call the office with any worsening of symptoms or onset of side effects, or if any concerns or questions arise.  The contact information for the office is available to the patient, telephone number [phone]. Patient is agreeable to call 911 or go to the nearest emergency department should they begin having any suicidal or homicidal  ideations, plans, or intent, or if any urgent concerns arise. No medication side effects or related complaints today.      Important educational information made available and provided in after visit summary for patient to review.     Due to the nature of virtual visits and inability to monitor vital signs and weight with virtual visits, the patient has been encouraged to monitor their vital signs and weight regularly either through self-monitoring via home device(s) or with their Primary Care Provider, and the patient has been instructed to notify this APRN of any abnormalities or changes from baseline.     Patient aware of limitations of provider's availability and office hours, and how to reach provider/office if needed (office number for patient to call for any questions/concerns: [phone]). If the patient's needs require more frequent or intensive management/monitoring than can be provided from this provider utilizing a strictly virtual platform, or care that is outside of this provider's scope, then patient may be referred to more appropriate provider or modality.                       VERBAL INFORMED CONSENT FOR MEDICATION:  The patient was educated that their proposed/prescribed psychotropic medication(s) has potential risks, side effects, adverse effects, and black box warnings; and these have been discussed with the patient.  The patient has been informed that their treatment and medication dosage is to be individualized, and may even be above or below the recommended range/dosage due to patient individualization and response, but medication is prescribed using a shared decision making approach, and no medication or dosage will be prescribed without the patient's verbal consent.  The reason for the use of the medication including any off label use and alternative modes of treatment other than or in addition to medication has been considered and discussed, the probable consequences of not receiving the  proposed treatment have been discussed, and any treatment side effects, black box warnings, and cautions associated with treatment have been discussed with the patient.  The patient is allowed ample time to openly discuss and ask questions regarding the proposed medication(s) and treatment plan and the patient verbalizes understanding the reasons for the use of the medication, its potential risks and benefits, other alternative treatment(s), and the probable consequences that may occur if the proposed medication is not given.  The patient has been given ample time to ask questions and study the information and find the information to be specific, accurate, and complete.  The patient gives verbal consent for the medication(s) proposed/prescribed, they verbalized understanding that they can refuse and withdraw consent at any time with the assistance of this APRN, and the patient has verbally confirmed that they are aware, and are willing, to take the prescribed medication and follow the treatment plan with the known possible risks, side effect, black box warnings, and any potential medication interactions, and the patient reports they will be worse off without this medication and treatment plan.  The patient is advised to contact this APRN/this office if any questions or concerns arise at any time (at [phone]), or call 911/go to the closest emergency department if needed or outside of office hours.        SUICIDE RISK ASSESSMENT AND SAFETY PLAN: Unalterable demographics and a history of mental health intervention indicate this patient is in a high risk category compared to the general population. At present, the patient denies active SI/HI, intentions, or plans at this time and agrees to seek immediate care should such thoughts develop. The patient verbalizes understanding of how to access emergency care if needed and agrees to do so. Consideration of suicide risk and protective factors such as history, current  presentation, individual strengths and weaknesses, psychosocial and environmental stressors and variables, psychiatric illness and symptoms, medical conditions and pain, took place in this interview. Based on those considerations, the patient is determined: within individual baseline and presenting no imminent risk for suicide or homicide. Other recommendations: The patient does not meet the criteria for inpatient admission and is not a safety risk to self or others at today's visit. Inpatient treatment offers no significant advantages over outpatient treatment for this patient at today's visit.  The patient was given ample time for questions and fully participated in treatment planning.  The patient was encouraged to call the clinic with any questions or concerns.  The patient was informed of access to emergency care. If patient were to develop any significant symptomatology, suicidal ideation, homicidal ideation, any concerns, or feel unsafe at any time they are to call the clinic and if unable to get immediate assistance should immediately call 911 or go to the nearest emergency room.  Patient contracted verbally for the following: If you are experiencing an emotional crisis or have thoughts of harming yourself or others, please go to your nearest local emergency room or call 911. Will continue to re-assess medication response and side effects frequently to establish efficacy and ensure safety. Risks, any black box warnings, side effects, off label usage, and benefits of medication and treatment discussed with patient, along with potential adverse side effects of current and/or newly prescribed medication, alternative treatment options, and OTC medications.  Patient verbalized understanding of potential risks, any off label use of medication, any black box warnings, and any side effects in their own words. The patient verbalized understanding and agreed to comply with the safety plan discussed in their own words.   Patient given the number to the office. Number also discussed of the 24- hour suicide hotline.             MEDS ORDERED DURING VISIT:  New Medications Ordered This Visit   Medications    buPROPion XL (WELLBUTRIN XL) 300 MG 24 hr tablet     Sig: Take 1 tablet by mouth Daily.     Dispense:  90 tablet     Refill:  0    propranolol (INDERAL) 10 MG tablet     Sig: Take 1 tablet by mouth 3 (Three) Times a Day As Needed (anxiety).     Dispense:  270 tablet     Refill:  0       Future Appointments         Provider Department Center    9/3/2025 3:00 PM Nai Wise APRN [facility]    9/11/2025 1:15 PM (Arrive by 1:00 PM) Mike Restrepo MD [facility]              Return in about 3 months (around 9/9/2025), or if symptoms worsen or fail to improve, for Recheck.        The patient will follow-up in approximately 3 months, and follow-up appointment has been scheduled with the patient confirming agreeability/availability for date/time during today's encounter.  Instructed the patient to contact the [facility] if they have any questions or concerns, and that a sooner appointment will be provided if needed.  The patient verbalized understanding in their own words and endorsed that they are agreeable to this.        Functional Status: Moderate impairment     Prognosis: Good with Ongoing Treatment             This document has been electronically signed by ELMO Last  June 9, 2025 15:55 EDT      Some of the data in this electronic note has been brought forward from a previous encounter, any necessary changes have been made, it has been reviewed by this ELMO, and it is accurate.       Part of this note may be an electronic transcription/translation of spoken language to printed text using the Dragon Dictation System.